# Patient Record
Sex: FEMALE | Race: WHITE | Employment: UNEMPLOYED | URBAN - METROPOLITAN AREA
[De-identification: names, ages, dates, MRNs, and addresses within clinical notes are randomized per-mention and may not be internally consistent; named-entity substitution may affect disease eponyms.]

---

## 2015-09-22 LAB — COLONOSCOPY, EXTERNAL: NORMAL

## 2017-04-13 RX ORDER — SIMVASTATIN 40 MG/1
TABLET, FILM COATED ORAL
Qty: 30 TAB | Refills: 0 | Status: SHIPPED | OUTPATIENT
Start: 2017-04-13 | End: 2017-05-16 | Stop reason: SDUPTHER

## 2017-04-13 RX ORDER — HYDROCHLOROTHIAZIDE 12.5 MG/1
12.5 CAPSULE ORAL DAILY
Qty: 30 CAP | Refills: 0 | Status: SHIPPED | OUTPATIENT
Start: 2017-04-13 | End: 2017-05-16 | Stop reason: SDUPTHER

## 2017-04-13 NOTE — TELEPHONE ENCOUNTER
Received faxed refill request for this medication from the pharmacy that is on file.     hydroCHLOROthiazide (MICROZIDE) 12.5 mg capsule  Normal, Disp-30 Cap, R-0    simvastatin (ZOCOR) 40 mg tablet  TAKE 1 TABLET BY MOUTH NIGHTLY, Normal, Disp-30 Tab, R-0

## 2017-05-16 NOTE — LETTER
5/19/2017 10:47 AM 
 
Ms. Estiven Ferrari 
[address] [facility] [address] Dear Ms. Elier Hopkins missed you! Please call our office at [phone] and schedule a follow up appointment for your continued care. Sincerely, Tonny Bledsoe MD

## 2017-05-18 RX ORDER — SIMVASTATIN 40 MG/1
TABLET, FILM COATED ORAL
Qty: 30 TAB | Refills: 0 | Status: SHIPPED | OUTPATIENT
Start: 2017-05-18 | End: 2017-06-15 | Stop reason: SDUPTHER

## 2017-05-18 RX ORDER — HYDROCHLOROTHIAZIDE 12.5 MG/1
CAPSULE ORAL
Qty: 30 CAP | Refills: 0 | Status: SHIPPED | OUTPATIENT
Start: 2017-05-18 | End: 2017-06-15 | Stop reason: SDUPTHER

## 2017-06-02 ENCOUNTER — TELEPHONE (OUTPATIENT)
Dept: FAMILY MEDICINE CLINIC | Age: 67
End: 2017-06-02

## 2017-06-02 NOTE — TELEPHONE ENCOUNTER
----- Message from [facility] sent at 6/2/2017  2:54 PM EDT -----  Regarding: Dr. Jade Chen called requesting a call back from Siria Barrios in regards to a referral. Pt contact number [phone]. Pt need referral for Dermatologist Bella Vu [phone]. Need appt for Wednesday 6/7/17 at 3:00 pm. Address is [address] [place] [address], [address] [place] [address].  Patient is being seen for a follow up on a skin lesion

## 2017-06-07 NOTE — TELEPHONE ENCOUNTER
Referral Request Telephone Call      Insurance Name:     [facility] (MEDICARE REPLACEMENT/ADVANTAGE - HMO) [id number] [place] [address] Road , [address], [id number] [place] [address] Drive [phone] View additional contact information       Insurance ID:  [id number]   Specialist Name: Dr. Taylor Soto   Type of Specialty:  Dermatology    Address of Specialist:  Noemi Helvetia, [address] [place] [address]   Phone/Fax Number of Specialist: MQCNJ#[phone]  [facility]#[id number]   Diagnosis: Follow up appointment for a skin lesion   Appointment Date: 6/7/2017 @ 3pm   NPI    Tax ID

## 2017-06-15 RX ORDER — SIMVASTATIN 40 MG/1
TABLET, FILM COATED ORAL
Qty: 30 TAB | Refills: 0 | Status: SHIPPED | OUTPATIENT
Start: 2017-06-15 | End: 2017-07-14 | Stop reason: SDUPTHER

## 2017-06-15 RX ORDER — HYDROCHLOROTHIAZIDE 12.5 MG/1
CAPSULE ORAL
Qty: 30 CAP | Refills: 0 | Status: SHIPPED | OUTPATIENT
Start: 2017-06-15 | End: 2017-07-10 | Stop reason: SDUPTHER

## 2017-07-10 ENCOUNTER — OFFICE VISIT (OUTPATIENT)
Dept: FAMILY MEDICINE CLINIC | Age: 67
End: 2017-07-10

## 2017-07-10 VITALS
RESPIRATION RATE: 18 BRPM | TEMPERATURE: 98.2 F | DIASTOLIC BLOOD PRESSURE: 76 MMHG | BODY MASS INDEX: 36.79 KG/M2 | OXYGEN SATURATION: 97 % | WEIGHT: 207.6 LBS | HEART RATE: 66 BPM | SYSTOLIC BLOOD PRESSURE: 140 MMHG | HEIGHT: 63 IN

## 2017-07-10 DIAGNOSIS — R20.0 THIGH NUMBNESS: ICD-10-CM

## 2017-07-10 DIAGNOSIS — E78.5 HYPERLIPIDEMIA, UNSPECIFIED HYPERLIPIDEMIA TYPE: ICD-10-CM

## 2017-07-10 DIAGNOSIS — Z71.89 ADVANCE CARE PLANNING: ICD-10-CM

## 2017-07-10 DIAGNOSIS — Z13.820 SCREENING FOR OSTEOPOROSIS: ICD-10-CM

## 2017-07-10 DIAGNOSIS — R13.10 DYSPHAGIA, UNSPECIFIED TYPE: ICD-10-CM

## 2017-07-10 DIAGNOSIS — Z00.00 ENCOUNTER FOR MEDICARE ANNUAL WELLNESS EXAM: Primary | ICD-10-CM

## 2017-07-10 DIAGNOSIS — R73.03 PREDIABETES: ICD-10-CM

## 2017-07-10 DIAGNOSIS — Z78.0 POSTMENOPAUSAL: ICD-10-CM

## 2017-07-10 DIAGNOSIS — I10 HTN, GOAL BELOW 140/90: ICD-10-CM

## 2017-07-10 RX ORDER — HYDROCHLOROTHIAZIDE 12.5 MG/1
CAPSULE ORAL
Qty: 30 CAP | Refills: 0 | Status: SHIPPED | OUTPATIENT
Start: 2017-07-10 | End: 2017-07-14 | Stop reason: SDUPTHER

## 2017-07-10 NOTE — PROGRESS NOTES
Iza Christian is a 79 y.o. female and presents for annual Medicare Wellness Visit. Problem List: Reviewed with patient and discussed risk factors. Patient Active Problem List   Diagnosis Code    Insomnia G47.00    Depression F32.9    Esophageal reflux K21.9    Obesity, unspecified E66.9    Allergic rhinitis due to other allergen J30.89    Schizophrenia (HCC) F20.9    Anxiety F41.9    Arthritis M19.90    Asthma J45.909    HTN, goal below 140/90 I10    IBS (irritable bowel syndrome) K58.9    Osteopenia M85.80    Prediabetes R73.03    Skin cancer C44.90    Stroke (HCC) I63.9    Sun-damaged skin L57.8    PFO (patent foramen ovale) Q21.1    AVM (arteriovenous malformation) brain Q28.2    Hyperlipidemia E78.5       Current medical providers:  Patient Care Team:  Cheryl Jeffery MD as PCP - General (Family Practice)  Yarelis Chan MD (Psychiatry)  Addy Hamilton MD (Cardiology)    PSH: Reviewed with patient  Past Surgical History:   Procedure Laterality Date    HX ADENOIDECTOMY      HX HEENT Bilateral     surgery for lazy eye    HX HYSTERECTOMY  1984    endometriosis    HX ORTHOPAEDIC      bone spur removed from right foot    HX TONSILLECTOMY          SH: Reviewed with patient  Social History   Substance Use Topics    Smoking status: Never Smoker    Smokeless tobacco: Never Used    Alcohol use No       FH: Reviewed with patient  Family History   Problem Relation Age of Onset    Stroke Mother     Heart Disease Father     Other Sister      benign brain tumor/arthritis    Thyroid Disease Sister      goiter    Diabetes Sister        Medications/Allergies: Reviewed with patient  Current Outpatient Prescriptions on File Prior to Visit   Medication Sig Dispense Refill    simvastatin (ZOCOR) 40 mg tablet TAKE ONE TABLET BY MOUTH NIGHTLY (PATIENT IS OVERDUE FOR OFFICE VISIT) 30 Tab 0    haloperidol (HALDOL) 1 mg tablet Take 1 Tab by mouth daily.  30 Tab 3    aspirin (ASPIRIN) 325 mg tablet Take 1 Tab by mouth daily. 365 Tab 0    FLUoxetine (PROZAC) 40 mg capsule Take 40 mg by mouth daily.  loratadine 10 mg Cap Take 10 mg by mouth as needed.  Omega-3-DHA-EPA-Fish Oil 1,000 (120-180) mg Cap Take 1,000 mg by mouth two (2) times a day. No current facility-administered medications on file prior to visit. Allergies   Allergen Reactions    Voltaren [Diclofenac Sodium] Diarrhea       Objective:  Visit Vitals    /76 (BP 1 Location: Right arm, BP Patient Position: Sitting)    Pulse 66    Temp 98.2 °F (36.8 °C) (Oral)    Resp 18    Ht 5' 3\" (1.6 m)    Wt 207 lb 9.6 oz (94.2 kg)    SpO2 97%    BMI 36.77 kg/m2    Body mass index is 36.77 kg/(m^2). Assessment of cognitive impairment: Alert and oriented x 3    Depression Screen:   PHQ over the last two weeks 3/8/2016   Little interest or pleasure in doing things More than half the days   Feeling down, depressed or hopeless Several days   Total Score PHQ 2 3       Fall Risk Assessment:    Fall Risk Assessment, last 12 mths 7/10/2017   Able to walk? Yes   Fall in past 12 months? No       Functional Ability:   Does the patient exhibit a steady gait? yes   How long did it take the patient to get up and walk from a sitting position? 3 secs   Is the patient self reliant?  (ie can do own laundry, meals, household chores)  yes     Does the patient handle his/her own medications? yes     Does the patient handle his/her own money? yes     Is the patients home safe (ie good lighting, handrails on stairs and bath, etc.)? yes     Did you notice or did patient express any hearing difficulties? no     Did you notice or did patient express any vision difficulties?   no     Were distance and reading eye charts used?   no       Advance Care Planning:   Patient was offered the opportunity to discuss advance care planning:  yes     Does patient have an Advance Directive:  no   If no, did you provide information on Caring Connections? yes       Plan:      Orders Placed This Encounter    DEXA BONE DENSITY STUDY AXIAL    HEMOGLOBIN A1C WITH EAG    METABOLIC PANEL, COMPREHENSIVE    LIPID PANEL    REFERRAL TO GASTROENTEROLOGY    hydroCHLOROthiazide (MICROZIDE) 12.5 mg capsule       Health Maintenance   Topic Date Due    GLAUCOMA SCREENING Q2Y  03/31/2017    Pneumococcal 65+ High/Highest Risk (2 of 2 - PCV13) 07/22/2017    INFLUENZA AGE 9 TO ADULT  08/01/2017    MEDICARE YEARLY EXAM  07/11/2018    BREAST CANCER SCRN MAMMOGRAM  08/04/2018    COLONOSCOPY  09/16/2025    DTaP/Tdap/Td series (2 - Td) 03/08/2026    Hepatitis C Screening  Completed    OSTEOPOROSIS SCREENING (DEXA)  Completed    ZOSTER VACCINE AGE 60>  Completed       *Patient verbalized understanding and agreement with the plan. A copy of the After Visit Summary with personalized health plan was given to the patient today.

## 2017-07-10 NOTE — MR AVS SNAPSHOT
Visit Information Date & Time Provider Department Dept. Phone Encounter #  
 7/10/2017  3:15 PM Arash Navarrete  [facility] [phone] [id number] Follow-up Instructions Return in about 6 months (around 1/10/2018) for HTN follow up. Upcoming Health Maintenance Date Due  
 GLAUCOMA SCREENING Q2Y 3/31/2017 Pneumococcal 65+ High/Highest Risk (2 of 2 - PCV13) 7/22/2017 MEDICARE YEARLY EXAM 7/23/2017 INFLUENZA AGE 9 TO ADULT 8/1/2017 BREAST CANCER SCRN MAMMOGRAM 8/4/2018 COLONOSCOPY 9/16/2025 DTaP/Tdap/Td series (2 - Td) 3/8/2026 Allergies as of 7/10/2017  Review Complete On: 7/10/2017 By: Brandy Winston LPN Severity Noted Reaction Type Reactions Voltaren [Diclofenac Sodium]  09/21/2015    Diarrhea Current Immunizations  Reviewed on 7/22/2016 Name Date Hep B Vaccine (Adult) 10/8/2015  2:40 PM, 9/9/2015 Influenza Vaccine 10/26/2015 Influenza Vaccine Whole 10/16/2009 Pneumococcal Polysaccharide (PPSV-23) 7/22/2016, 3/20/2015 Tdap 3/8/2016 Zoster Vaccine, Live 7/22/2016 Not reviewed this visit You Were Diagnosed With   
  
 Codes Comments HTN, goal below 140/90    -  Primary ICD-10-CM: I10 
ICD-9-CM: 401.9 Postmenopausal     ICD-10-CM: Z78.0 ICD-9-CM: V49.81 Screening for osteoporosis     ICD-10-CM: Z13.820 ICD-9-CM: V82.81 Prediabetes     ICD-10-CM: R73.03 
ICD-9-CM: 790.29 Hyperlipidemia, unspecified hyperlipidemia type     ICD-10-CM: E78.5 ICD-9-CM: 272.4 Dysphagia, unspecified type     ICD-10-CM: R13.10 ICD-9-CM: 787.20 Vitals BP Pulse Temp Resp Height(growth percentile) Weight(growth percentile) 140/76 (BP 1 Location: Right arm, BP Patient Position: Sitting) 66 98.2 °F (36.8 °C) (Oral) 18 5' 3\" (1.6 m) 207 lb 9.6 oz (94.2 kg) SpO2 BMI OB Status Smoking Status 97% 36.77 kg/m2 Hysterectomy Never Smoker BMI and BSA Data Body Mass Index Body Surface Area  
 36.77 kg/m 2 2.05 m 2 Preferred Pharmacy Pharmacy Name Phone [facility], [address] [place] [address] [phone] Your Updated Medication List  
  
   
This list is accurate as of: 7/10/17  3:47 PM.  Always use your most recent med list.  
  
  
  
  
 aspirin 325 mg tablet Commonly known as:  ASPIRIN Take 1 Tab by mouth daily. haloperidol 1 mg tablet Commonly known as:  HALDOL Take 1 Tab by mouth daily. hydroCHLOROthiazide 12.5 mg capsule Commonly known as:  Jevon Roosevelt TAKE ONE CAPSULE BY MOUTH DAILY (PATIENT NEEDS TO MAKE FOLLOW UP VISIT)  
  
 loratadine 10 mg Cap Take 10 mg by mouth as needed. Omega-3-DHA-EPA-Fish Oil 1,000 mg (120 mg-180 mg) Cap Take 1,000 mg by mouth two (2) times a day. PROzac 40 mg capsule Generic drug:  FLUoxetine Take 40 mg by mouth daily. simvastatin 40 mg tablet Commonly known as:  ZOCOR  
TAKE ONE TABLET BY MOUTH NIGHTLY (PATIENT IS OVERDUE FOR OFFICE VISIT) Prescriptions Sent to Pharmacy Refills  
 hydroCHLOROthiazide (MICROZIDE) 12.5 mg capsule 0 Sig: TAKE ONE CAPSULE BY MOUTH DAILY (PATIENT NEEDS TO MAKE FOLLOW UP VISIT) Class: Normal  
 Pharmacy: [facility], [address] [place] [address] Ph #: [phone] We Performed the Following HEMOGLOBIN A1C WITH EAG [54391 CPT(R)] LIPID PANEL [82702 CPT(R)] METABOLIC PANEL, COMPREHENSIVE [79467 CPT(R)] REFERRAL TO GASTROENTEROLOGY [FUE09 Custom] Follow-up Instructions Return in about 6 months (around 1/10/2018) for HTN follow up. To-Do List   
 07/10/2017 Imaging:  DEXA BONE DENSITY STUDY AXIAL Referral Information Referral ID Referred By Referred To  
  
 [id number] SHIVANI HUFF [address]  [address] [facility] [address], [address] Phone: [phone] Fax: [phone] Visits Status Start Date End Date 1 New Request 7/10/17 7/10/18 If your referral has a status of pending review or denied, additional information will be sent to support the outcome of this decision. Patient Instructions DASH Diet: Care Instructions Your Care Instructions The DASH diet is an eating plan that can help lower your blood pressure. DASH stands for Dietary Approaches to Stop Hypertension. Hypertension is high blood pressure. The DASH diet focuses on eating foods that are high in calcium, potassium, and magnesium. These nutrients can lower blood pressure. The foods that are highest in these nutrients are fruits, vegetables, low-fat dairy products, nuts, seeds, and legumes. But taking calcium, potassium, and magnesium supplements instead of eating foods that are high in those nutrients does not have the same effect. The DASH diet also includes whole grains, fish, and poultry. The DASH diet is one of several lifestyle changes your doctor may recommend to lower your high blood pressure. Your doctor may also want you to decrease the amount of sodium in your diet. Lowering sodium while following the DASH diet can lower blood pressure even further than just the DASH diet alone. Follow-up care is a key part of your treatment and safety. Be sure to make and go to all appointments, and call your doctor if you are having problems. It's also a good idea to know your test results and keep a list of the medicines you take. How can you care for yourself at home? Following the DASH diet · Eat 4 to 5 servings of fruit each day. A serving is 1 medium-sized piece of fruit, ½ cup chopped or canned fruit, 1/4 cup dried fruit, or 4 ounces (½ cup) of fruit juice. Choose fruit more often than fruit juice. · Eat 4 to 5 servings of vegetables each day.  A serving is 1 cup of lettuce or raw leafy vegetables, ½ cup of chopped or cooked vegetables, or 4 ounces (½ cup) of vegetable juice. Choose vegetables more often than vegetable juice. · Get 2 to 3 servings of low-fat and fat-free dairy each day. A serving is 8 ounces of milk, 1 cup of yogurt, or 1 ½ ounces of cheese. · Eat 6 to 8 servings of grains each day. A serving is 1 slice of bread, 1 ounce of dry cereal, or ½ cup of cooked rice, pasta, or cooked cereal. Try to choose whole-grain products as much as possible. · Limit lean meat, poultry, and fish to 2 servings each day. A serving is 3 ounces, about the size of a deck of cards. · Eat 4 to 5 servings of nuts, seeds, and legumes (cooked dried beans, lentils, and split peas) each week. A serving is 1/3 cup of nuts, 2 tablespoons of seeds, or ½ cup of cooked beans or peas. · Limit fats and oils to 2 to 3 servings each day. A serving is 1 teaspoon of vegetable oil or 2 tablespoons of salad dressing. · Limit sweets and added sugars to 5 servings or less a week. A serving is 1 tablespoon jelly or jam, ½ cup sorbet, or 1 cup of lemonade. · Eat less than 2,300 milligrams (mg) of sodium a day. If you limit your sodium to 1,500 mg a day, you can lower your blood pressure even more. Tips for success · Start small. Do not try to make dramatic changes to your diet all at once. You might feel that you are missing out on your favorite foods and then be more likely to not follow the plan. Make small changes, and stick with them. Once those changes become habit, add a few more changes. · Try some of the following: ¨ Make it a goal to eat a fruit or vegetable at every meal and at snacks. This will make it easy to get the recommended amount of fruits and vegetables each day. ¨ Try yogurt topped with fruit and nuts for a snack or healthy dessert. ¨ Add lettuce, tomato, cucumber, and onion to sandwiches. ¨ Combine a ready-made pizza crust with low-fat mozzarella cheese and lots of vegetable toppings.  Try using tomatoes, squash, spinach, broccoli, carrots, cauliflower, and onions. ¨ Have a variety of cut-up vegetables with a low-fat dip as an appetizer instead of chips and dip. ¨ Sprinkle sunflower seeds or chopped almonds over salads. Or try adding chopped walnuts or almonds to cooked vegetables. ¨ Try some vegetarian meals using beans and peas. Add garbanzo or kidney beans to salads. Make burritos and tacos with mashed molina beans or black beans. Where can you learn more? Go to http://[employer].info/. Enter B108 in the search box to learn more about \"DASH Diet: Care Instructions. \" Current as of: April 3, 2017 Content Version: 11.3 © 1899-7925 [employer]. Care instructions adapted under license by [employer] (which disclaims liability or warranty for this information). If you have questions about a medical condition or this instruction, always ask your healthcare professional. [place] [address] any warranty or liability for your use of this information. Introducing [facility]! Dear Moses Morales: Thank you for requesting a [employer] account. Our records indicate that you already have an active [employer] account. You can access your account anytime at [email or web address] Did you know that you can access your hospital and ER discharge instructions at any time in [employer]? You can also review all of your test results from your hospital stay or ER visit. Additional Information If you have questions, please visit the Frequently Asked Questions section of the [employer] website at [email or web address] Remember, [employer] is NOT to be used for urgent needs. For medical emergencies, dial 911. Now available from your iPhone and Android! Please provide this summary of care documentation to your next provider. Your primary care clinician is listed as Yareli Alamo.  If you have any questions after today's visit, please call [phone].

## 2017-07-10 NOTE — PATIENT INSTRUCTIONS

## 2017-07-10 NOTE — LETTER
7/11/2017 11:27 AM 
 
Ms. Janett Rojas 
[address] [facility] [address] Dear Janett Bob: 
 
Please find your most recent results below. Resulted Orders HEMOGLOBIN A1C WITH EAG Result Value Ref Range Hemoglobin A1c 5.4 4.8 - 5.6 % Comment:  
            Pre-diabetes: 5.7 - 6.4 Diabetes: >6.4 Glycemic control for adults with diabetes: <7.0 Estimated average glucose 108 mg/dL Narrative Performed at:  [address]  [id number] : Marisa Byers MD, Phone:  [phone] METABOLIC PANEL, COMPREHENSIVE Result Value Ref Range Glucose 86 65 - 99 mg/dL BUN 18 8 - 27 mg/dL Creatinine 0.87 0.57 - 1.00 mg/dL GFR est non-AA 69 >59 mL/min/1.73 GFR est AA 80 >59 mL/min/1.73  
 BUN/Creatinine ratio 21 12 - 28 Sodium 140 134 - 144 mmol/L Potassium 4.1 3.5 - 5.2 mmol/L Chloride 97 96 - 106 mmol/L  
 CO2 25 18 - 29 mmol/L Calcium 10.3 8.7 - 10.3 mg/dL Protein, total 7.2 6.0 - 8.5 g/dL Albumin 4.6 3.6 - 4.8 g/dL GLOBULIN, TOTAL 2.6 1.5 - 4.5 g/dL A-G Ratio 1.8 1.2 - 2.2 Bilirubin, total 0.9 0.0 - 1.2 mg/dL Alk. phosphatase 59 39 - 117 IU/L  
 AST (SGOT) 17 0 - 40 IU/L  
 ALT (SGPT) 22 0 - 32 IU/L Narrative Performed at:  [address]  [id number] : Marisa Byers MD, Phone:  [phone] LIPID PANEL Result Value Ref Range Cholesterol, total 136 100 - 199 mg/dL Triglyceride 118 0 - 149 mg/dL HDL Cholesterol 46 >39 mg/dL VLDL, calculated 24 5 - 40 mg/dL LDL, calculated 66 0 - 99 mg/dL Narrative Performed at:  [address]  [id number] : Marisa Byers MD, Phone:  [phone] CVD REPORT Result Value Ref Range INTERPRETATION Note Comment:  
   Supplement report is available. Narrative Performed at:  [address] [place] [address]  [id number] : Contreras Castanon PhD, Phone:  [phone] RECOMMENDATIONS: 
Your labs are stable. Please continue your medications as instructed. Please call me if you have any questions: [phone] Sincerely, Cordelia Esquivel MD

## 2017-07-10 NOTE — PROGRESS NOTES
Patient Name: Acosta Dupree   MRN: [id number]    Kristen Ro is a 79 y.o. female who presents with the following: The patient has hypertension, hyperlipidemia and pre-DM. She reports taking medications as instructed, no medication side effects noted, no TIA's, no chest pain on exertion, no dyspnea on exertion, no swelling of ankles, no orthopnea or paroxysmal nocturnal dyspnea. Diet and Lifestyle: generally follows a low fat low cholesterol diet, generally follows a low sodium diet, no formal exercise but active during the day. Lab review: orders written for new lab studies as appropriate; see orders. Dysphagia  Reports one year hx of progressive difficulty swallowing with foods and liquids. States she sometimes throws up food and is then able to swallow food afterwards. Has difficulty swallowing pills with water as well. Denies weight loss, fevers, abdominal pain. Never a smoker. L thigh numbness  Reports several year hx of intermittent numbness, burning, tingling. Has not tried any medications. Denies wearing tight belts or waistline. Does state that she spends most of her time in a recliner at home. Reports history of chronic low back pain. History of osteopenia; discontinued Fosamax recently as she did not meet threshold for treatment therapy yet. Due for DEXA scan. Review of Systems   Constitutional: Negative for fever, malaise/fatigue and weight loss. Respiratory: Negative for cough, hemoptysis, shortness of breath and wheezing. Cardiovascular: Negative for chest pain, palpitations, leg swelling and PND. Gastrointestinal: Positive for heartburn. Negative for abdominal pain, blood in stool, constipation, diarrhea, melena, nausea and vomiting.         Difficulty swallowing   Musculoskeletal:        Left thigh numbness       The patient's medications, allergies, past medical history, surgical history, family history and social history were reviewed and updated where appropriate. Prior to Admission medications    Medication Sig Start Date End Date Taking? Authorizing Provider   hydroCHLOROthiazide (MICROZIDE) 12.5 mg capsule TAKE ONE CAPSULE BY MOUTH DAILY (PATIENT NEEDS TO MAKE FOLLOW UP VISIT) 6/15/17  Yes Kaley Reina MD   simvastatin (ZOCOR) 40 mg tablet TAKE ONE TABLET BY MOUTH NIGHTLY (PATIENT IS OVERDUE FOR OFFICE VISIT) 6/15/17  Yes Kaley Reina MD   haloperidol (HALDOL) 1 mg tablet Take 1 Tab by mouth daily. 3/20/15  Yes Macario Parra MD   aspirin (ASPIRIN) 325 mg tablet Take 1 Tab by mouth daily. 6/13/13  Yes Bayron Mora MD   FLUoxetine (PROZAC) 40 mg capsule Take 40 mg by mouth daily. Yes Historical Provider   loratadine 10 mg Cap Take 10 mg by mouth as needed. Yes Historical Provider   Omega-3-DHA-EPA-Fish Oil 1,000 (120-180) mg Cap Take 1,000 mg by mouth two (2) times a day. Yes Historical Provider       Allergies   Allergen Reactions    Voltaren [Diclofenac Sodium] Diarrhea           OBJECTIVE    Visit Vitals    /76 (BP 1 Location: Right arm, BP Patient Position: Sitting)    Pulse 66    Temp 98.2 °F (36.8 °C) (Oral)    Resp 18    Ht 5' 3\" (1.6 m)    Wt 207 lb 9.6 oz (94.2 kg)    SpO2 97%    BMI 36.77 kg/m2       Physical Exam   Constitutional: She is well-developed, well-nourished, and in no distress. No distress. HENT:   Head: Normocephalic and atraumatic. Right Ear: External ear normal.   Left Ear: External ear normal.   Eyes: Conjunctivae and EOM are normal. Pupils are equal, round, and reactive to light. Cardiovascular: Normal rate, regular rhythm and normal heart sounds. Exam reveals no gallop and no friction rub. No murmur heard. Pulmonary/Chest: Effort normal and breath sounds normal. No respiratory distress. She has no wheezes. Musculoskeletal:        Right hip: Normal.        Left hip: Normal.   Skin: She is not diaphoretic.    Psychiatric: Mood, memory, affect and judgment normal.   Nursing note and vitals reviewed. ASSESSMENT AND PLAN  Julian Hathaway is a 79 y.o. female who presents today for:    1. Encounter for Medicare annual wellness exam  See other note    2. HTN, goal below 140/90  Stable, continue current treatment. 3. Prediabetes  - HEMOGLOBIN A1C WITH EAG  - METABOLIC PANEL, COMPREHENSIVE    4. Hyperlipidemia, unspecified hyperlipidemia type  Will calculate ASCVD risk score pending labs. - LIPID PANEL    5. Dysphagia, unspecified type  Recommend GI referral given ongoing symptoms of dysphagia. May benefit from barium swallow study or EGD.  - REFERRAL TO GASTROENTEROLOGY    6. Postmenopausal  - DEXA BONE DENSITY STUDY AXIAL; Future    7. Screening for osteoporosis  - DEXA BONE DENSITY STUDY AXIAL; Future    8. Thigh numbness  Unclear etiology; may be due to meralgia paresthetica. Recommended patient to try modifying her sitting position or getting a new cushion. 9. Advance care planning  Pt to meet with NN. Medications Discontinued During This Encounter   Medication Reason    traMADol (ULTRAM) 50 mg tablet Therapy Completed    hydroCHLOROthiazide (MICROZIDE) 12.5 mg capsule Reorder       Follow-up Disposition:  Return in about 6 months (around 1/10/2018) for HTN follow up. Medication risks/benefits/costs/interactions/alternatives discussed with patient. Advised patient to call back or return to office if symptoms worsen/change/persist. If patient cannot reach us or should anything more severe/urgent arise he/she should proceed directly to the nearest emergency department. Discussed expected course/resolution/complications of diagnosis in detail with patient. Patient given a written after visit summary which includes his/her diagnoses, current medications and vitals. Patient expressed understanding with the diagnosis and plan.      Brit Magana M.D.

## 2017-07-10 NOTE — ACP (ADVANCE CARE PLANNING)
Advance Care Planning:   Patient was offered the opportunity to discuss advance care planning:  yes     Does patient have an Advance Directive:  no   If no, did you provide information on Caring Connections? yes     Conducted ACP discussion today. Advanced Care Planning Information Card copy provided to patient; he/she will reach out to NN/facilitator to review.

## 2017-07-10 NOTE — PROGRESS NOTES
Chief Complaint   Patient presents with    Medication Evaluation     needs refills     1. Have you been to the ER, urgent care clinic since your last visit? Hospitalized since your last visit? No    2. Have you seen or consulted any other health care providers outside of the [address] [place] [address] since your last visit? Include any pap smears or colon screening.  No

## 2017-07-11 LAB
ALBUMIN SERPL-MCNC: 4.6 G/DL (ref 3.6–4.8)
ALBUMIN/GLOB SERPL: 1.8 {RATIO} (ref 1.2–2.2)
ALP SERPL-CCNC: 59 IU/L (ref 39–117)
ALT SERPL-CCNC: 22 IU/L (ref 0–32)
AST SERPL-CCNC: 17 IU/L (ref 0–40)
BILIRUB SERPL-MCNC: 0.9 MG/DL (ref 0–1.2)
BUN SERPL-MCNC: 18 MG/DL (ref 8–27)
BUN/CREAT SERPL: 21 (ref 12–28)
CALCIUM SERPL-MCNC: 10.3 MG/DL (ref 8.7–10.3)
CHLORIDE SERPL-SCNC: 97 MMOL/L (ref 96–106)
CHOLEST SERPL-MCNC: 136 MG/DL (ref 100–199)
CO2 SERPL-SCNC: 25 MMOL/L (ref 18–29)
CREAT SERPL-MCNC: 0.87 MG/DL (ref 0.57–1)
EST. AVERAGE GLUCOSE BLD GHB EST-MCNC: 108 MG/DL
GLOBULIN SER CALC-MCNC: 2.6 G/DL (ref 1.5–4.5)
GLUCOSE SERPL-MCNC: 86 MG/DL (ref 65–99)
HBA1C MFR BLD: 5.4 % (ref 4.8–5.6)
HDLC SERPL-MCNC: 46 MG/DL
INTERPRETATION, 910389: NORMAL
LDLC SERPL CALC-MCNC: 66 MG/DL (ref 0–99)
POTASSIUM SERPL-SCNC: 4.1 MMOL/L (ref 3.5–5.2)
PROT SERPL-MCNC: 7.2 G/DL (ref 6–8.5)
SODIUM SERPL-SCNC: 140 MMOL/L (ref 134–144)
TRIGL SERPL-MCNC: 118 MG/DL (ref 0–149)
VLDLC SERPL CALC-MCNC: 24 MG/DL (ref 5–40)

## 2017-07-11 NOTE — PROGRESS NOTES
Please notify patient regarding their test results:    No DM. Lipid panel WNL on statin. Continue current meds.

## 2017-07-18 ENCOUNTER — TELEPHONE (OUTPATIENT)
Dept: FAMILY MEDICINE CLINIC | Age: 67
End: 2017-07-18

## 2017-07-18 NOTE — TELEPHONE ENCOUNTER
Referral Request Telephone Call      Insurance Name:     Cathy Garcia ([facility])    Insurance ID:   [id number]   Specialist Name: Dr. Lolis Berg   Type of Specialty:  Gastroenterologist    Address of Specialist:  [address]   Phone/Fax Number of Specialist: Phone:  [phone]  Fax: [phone]   Diagnosis: Difficulty swallowing   Appointment Date: 07/27/17   NPI    Tax ID

## 2017-08-03 ENCOUNTER — HOSPITAL ENCOUNTER (OUTPATIENT)
Dept: MAMMOGRAPHY | Age: 67
Discharge: HOME | End: 2017-08-03
Attending: FAMILY MEDICINE
Payer: MEDICARE

## 2017-08-03 DIAGNOSIS — Z13.820 SCREENING FOR OSTEOPOROSIS: ICD-10-CM

## 2017-08-03 DIAGNOSIS — Z78.0 POSTMENOPAUSAL: ICD-10-CM

## 2017-08-03 PROCEDURE — 77080 DXA BONE DENSITY AXIAL: CPT

## 2017-08-03 NOTE — LETTER
8/4/2017 10:08 AM 
 
Ms. Fritz Bowles 
[address] [facility] [id number] Dear Fritz Bowles: 
 
Please find your most recent results below. Resulted Orders DEXA BONE DENSITY STUDY AXIAL Narrative Bone Mineral Density Indication:  SCREENING Age: 79 Sex: Female. Menopause status: Postmenopausal. 
Hormone replacement therapy: No  
 
Number of falls in the past year:   None. Risk factors for osteoporosis:  None. Current medication for osteoporosis: None. Comparison: 4/3/2015 Technique: Imaging was performed on the [address] [place] [address] 
meta-analysis fracture risk calculator (FRAX) analysis was performed for 10 year 
fracture risk probability assessment Excluded sites: None Findings: 
  
 
Femoral Neck:  Left Bone mineral density (gm/cm2):? 0.813 
% of peak bone mass: 78 
% for age matched controls:? 89 
T-score: -1.6 Z-score: -0.7 Total Hip: Left Bone mineral density (gm/cm2):  0.956 
% of peak bone mass:   95 
% for age matched controls:  80 T-score:   -0.4 Z-score:  0.2 Lumbar Spine:  L1-L4 Bone mineral density (gm/cm2):  1.282 
% of peak bone mass:  107  
% for age matched controls:  80 T-score:  0.7 Z-score:  1.3 The T score for the left distal third radius is -0.4. Impression Impression: This patient is osteopenic using the World Health Organization criteria As compared to the prior study, there has been an increase in the bone mineral 
density of the lumbar spine of 9.3% but a decrease in the bone mineral density 
of the left total hip of 4.9%. 10 year probability of major osteoporotic fracture:  8.9% 10 year probability of hip fracture:  1.1% Recommendations: 
Therapy recommendations need to be tailored to each individual patient.  Using 
the [address] [facility]) FRAX absolute fracture algorithm, the 
National Osteoporosis Foundation recommends beginning pharmacological therapy in 
 postmenopausal women and men over the age of 48 with a 8 year probability of a 
hip fracture of >3% OR with the 10 year probability of a major osteoporotic 
fracture of >20%. Please reconsider testing based on risk factors. Currently, Medicare will only 
reimburse for a central DXA examination every two years, unless the patient is 
on chronic glucocorticoid therapy. Note: Please note that reliable, valid comparisons cannot be made between 
studies which have been performed on machines from different manufacturers. If 
clinically warranted, a follow up study performed at this site, on the same 
unit, would allow the most sensitive assessment of change in bone mineral 
density. RECOMMENDATIONS: 
Dexa still shows osteopenia, overall stable. For osteopenia, it is recommended to do weight-bearing exercises, and to get at least vitamin D3 800 units/day and calcium at 1500 mg/day. Will repeat DEXA in 2 years. Please call me if you have any questions: [phone] Sincerely, [facility] DEXA 1

## 2017-08-03 NOTE — PROGRESS NOTES
Please notify patient regarding their test results:    Dexa still shows osteopenia, overall stable. For osteopenia, it is recommended to do weight-bearing exercises, and to get at least vitamin D3 800 units/day and calcium at 1500 mg/day. Will repeat DEXA in 2 years.

## 2017-08-04 NOTE — PROGRESS NOTES
Spoke with patient after verifying name and  regarding Dr. Izella Cogan recommendations. Writer informed patient of Dr. Izella Cogan recommendations. Patient given an opportunity to ask questions, repeated information, and verbalized understanding.

## 2017-08-09 ENCOUNTER — HOSPITAL ENCOUNTER (OUTPATIENT)
Dept: GENERAL RADIOLOGY | Age: 67
Discharge: HOME | End: 2017-08-09
Attending: INTERNAL MEDICINE
Payer: MEDICARE

## 2017-08-09 DIAGNOSIS — R13.12 OROPHARYNGEAL DYSPHAGIA: ICD-10-CM

## 2017-08-09 DIAGNOSIS — R13.10 DYSPHAGIA: ICD-10-CM

## 2017-08-09 PROCEDURE — G8998 SWALLOW D/C STATUS: HCPCS

## 2017-08-09 PROCEDURE — G8996 SWALLOW CURRENT STATUS: HCPCS

## 2017-08-09 PROCEDURE — 74230 X-RAY XM SWLNG FUNCJ C+: CPT

## 2017-08-09 PROCEDURE — G8997 SWALLOW GOAL STATUS: HCPCS

## 2017-08-09 PROCEDURE — 92611 MOTION FLUOROSCOPY/SWALLOW: CPT

## 2017-08-09 NOTE — PROGRESS NOTES
[address] [place] [address] [employer], [address] [address]    Speech Pathology Modified barium swallow Study with cms g codes  Patient: Iza Christian (65 y.o. female)  Date: 8/9/2017  Referring Provider:  Dr. Xenia Lezama:   Patient reporting that her difficulty swallowing has progressively gotten worse over the last year. She reports difficulty swallowing breads and pills especially. Patient describes a globus sensation on the R side and eventual vomiting at least a couple times a week. OBJECTIVE:   Past Medical History:   Past Medical History:   Diagnosis Date    Allergic rhinitis due to other allergen     Anxiety     Arthritis     Asthma     in younger years    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    MRI 2013; stable ischemic changes; hx of CVA    Depression 9/23/2009    Esophageal reflux     HTN, goal below 140/90     Hyperlipidemia 9/23/2009    IBS (irritable bowel syndrome)     Insomnia     Mixed hyperlipidemia     Obesity, unspecified     Osteopenia     started Fosamax therapy 4/2015    PFO (patent foramen ovale)     reportedly dx'ed with CVA in 2012    Prediabetes     Schizophrenia ([address]     Skin cancer     Stroke ([facility] [address]     no deficits    Sun-damaged skin      Past Surgical History:   Procedure Laterality Date    HX ADENOIDECTOMY      HX HEENT Bilateral     surgery for lazy eye    HX HYSTERECTOMY  1984    endometriosis    HX ORTHOPAEDIC      bone spur removed from right foot    HX TONSILLECTOMY       Current Dietary Status: regular/thin liquids   Radiologist:  (Dr. Tate Sampson)  Film Views: Lateral;Fluoro  Patient Position:  (standing)    Trial 1:   Consistency Presented: Thin liquid; Solid;Puree;Pill/Tablet   How Presented: Self-fed/presented;Cup/sip; Successive swallows;Spoon   Consistency Amount:  (4oz thin Ba, Ba tablet, 1/2 Ba paste, cracker)   Bolus Acceptance: No impairment   Bolus Formation/Control: No impairment:     Propulsion: No impairment   Oral Residue: None   Initiation of Swallow: Triggered at base of tongue   Timing: No impairment   Penetration: None   Aspiration/Timing: No evidence of aspiration   Pharyngeal Clearance: No residue                       Decreased Tongue Base Retraction?: No  Laryngeal Elevation: WFL (within functional limits)  Aspiration/Penetration Score: 1 (No penetration or aspiration-Contrast does not enter the airway)  Pharyngeal Symmetry: Not assessed  Pharyngeal-Esophageal Segment: No impairment  Pharyngeal Dysfunction: None    Oral Phase Severity: No impairment  Pharyngeal Phase Severity: N/A    In compliance with CMSs Claims Based Outcome Reporting, the following G-code set was chosen for this patient based the use of the NOMS functional outcome to quantify this patient's level of swallowing impairment. Using the NOMS, the patient was determined to be at level 7 for swallow function which correlates with the CH= 0% level of severity. Based on the objective assessment provided within this note, the current, goal, and discharge g-codes are as follows:    Swallow  Swallowing:   Swallow Current Status CH= 0%   Swallow Goal Status CH= 0%   Swallow D/C Status CH= 0%        NOMS Swallowing Levels:  Level 1 (CN): NPO  Level 2 (CM): NPO but takes consistency in therapy  Level 3 (CL): Takes less than 50% of nutrition p.o. and continues with nonoral feedings; and/or safe with mod cues; and/or max diet restriction  Level 4 (CK): Safe swallow but needs mod cues; and/or mod diet restriction; and/or still requires some nonoral feeding/supplements  Level 5 (CJ): Safe swallow with min diet restriction; and/or needs min cues  Level 6 (CI): Independent with p.o.; rare cues; usually self cues; may need to avoid some foods or needs extra time  Level 7 ([address] [place] [address]): Independent for all p.o.  ROBERT. (2003). National Outcomes Measurement System (NOMS): Adult Speech-Language Pathology User's Guide.        ASSESSMENT :  Based on the objective data described above, the patient presents with functional oropharyngeal swallow. Timely and complete mastication of solids. Pharyngeal swallow initiation was timely, occurring at base of tongue. Adequate hyolaryngeal elevation/excursion with complete airway closure. No penetration/aspiration or pharyngeal residue with any consistency. Of note, patient with gagging as puree was passing through esophagus. All consistencies (thin, puree, cracker, barium tablet) passed through esophagus to stomach without delay. PLAN/RECOMMENDATIONS :  -- Diet as tolerated  -- f/u with GI       COMMUNICATION/EDUCATION:   The above findings and recommendations were discussed with: patient who verbalized understanding.     Thank you for this referral.  Ashley Thompson M.S. CCC-SLP  Time Calculation: 15 mins

## 2017-08-18 ENCOUNTER — TELEPHONE (OUTPATIENT)
Dept: FAMILY MEDICINE CLINIC | Age: 67
End: 2017-08-18

## 2017-08-18 NOTE — TELEPHONE ENCOUNTER
Una Smith with Dr. Galindo Palumbo office is calling, she would like to request a call back about a medication change that Dr. Ronny Whaley is requesting approval for. The patient has been seen in Dr. Galindo Palumbo office and has been scheduled for an upper endoscopy, however she is currently on a regimen of 325mg of aspirin a day, Dr. Ronny Whaley is requesting that the patient change the aspirin to 81mg a day the week before the procedure.      Best call back # for [phone]

## 2017-08-21 NOTE — TELEPHONE ENCOUNTER
Call to [facility] with Dr. Candice Burt office. Left message stating okay for patient to decrease 325mg to 81mg one week before surgery and will defer to Dr. Mirta Correia on when to increase post procedure.  Advised to call back if there are any issues/questions

## 2017-08-21 NOTE — TELEPHONE ENCOUNTER
Okay to reduce daily ASA dose from 325 mg to 81 mg one week prior to EGD (given pt's history of stroke, she should continue to take daily ASA regardless). Defer to Dr. Santo Romo on when she can go back to full dose 325 mg of ASA post-procedure.

## 2017-09-13 ENCOUNTER — TELEPHONE (OUTPATIENT)
Dept: FAMILY MEDICINE CLINIC | Age: 67
End: 2017-09-13

## 2017-09-13 RX ORDER — GUAIFENESIN 100 MG/5ML
81 LIQUID (ML) ORAL DAILY
COMMUNITY
End: 2018-06-07

## 2017-09-19 ENCOUNTER — ANESTHESIA EVENT (OUTPATIENT)
Dept: ENDOSCOPY | Age: 67
End: 2017-09-19
Payer: MEDICARE

## 2017-09-19 ENCOUNTER — ANESTHESIA (OUTPATIENT)
Dept: ENDOSCOPY | Age: 67
End: 2017-09-19
Payer: MEDICARE

## 2017-09-19 ENCOUNTER — HOSPITAL ENCOUNTER (OUTPATIENT)
Age: 67
Setting detail: OUTPATIENT SURGERY
Discharge: HOME | End: 2017-09-19
Attending: INTERNAL MEDICINE | Admitting: INTERNAL MEDICINE
Payer: MEDICARE

## 2017-09-19 VITALS
BODY MASS INDEX: 37.02 KG/M2 | WEIGHT: 209 LBS | OXYGEN SATURATION: 96 % | RESPIRATION RATE: 14 BRPM | HEART RATE: 89 BPM | DIASTOLIC BLOOD PRESSURE: 82 MMHG | TEMPERATURE: 97.6 F | SYSTOLIC BLOOD PRESSURE: 155 MMHG

## 2017-09-19 PROCEDURE — 76060000031 HC ANESTHESIA FIRST 0.5 HR: Performed by: INTERNAL MEDICINE

## 2017-09-19 PROCEDURE — 76040000019: Performed by: INTERNAL MEDICINE

## 2017-09-19 PROCEDURE — 74011250637 HC RX REV CODE- 250/637: Performed by: ANESTHESIOLOGY

## 2017-09-19 PROCEDURE — 74011250636 HC RX REV CODE- 250/636

## 2017-09-19 PROCEDURE — 77030011992 HC AIRWY NASOPHGL TELE -A: Performed by: ANESTHESIOLOGY

## 2017-09-19 PROCEDURE — 74011000250 HC RX REV CODE- 250

## 2017-09-19 RX ORDER — ATROPINE SULFATE 0.1 MG/ML
0.5 INJECTION INTRAVENOUS
Status: CANCELLED | OUTPATIENT
Start: 2017-09-19 | End: 2017-09-19

## 2017-09-19 RX ORDER — NALOXONE HYDROCHLORIDE 0.4 MG/ML
0.4 INJECTION, SOLUTION INTRAMUSCULAR; INTRAVENOUS; SUBCUTANEOUS
Status: CANCELLED | OUTPATIENT
Start: 2017-09-19 | End: 2017-09-19

## 2017-09-19 RX ORDER — DEXTROMETHORPHAN/PSEUDOEPHED 2.5-7.5/.8
1.2 DROPS ORAL
Status: CANCELLED | OUTPATIENT
Start: 2017-09-19

## 2017-09-19 RX ORDER — EPINEPHRINE 0.1 MG/ML
1 INJECTION INTRACARDIAC; INTRAVENOUS
Status: CANCELLED | OUTPATIENT
Start: 2017-09-19 | End: 2017-09-19

## 2017-09-19 RX ORDER — FLUMAZENIL 0.1 MG/ML
0.2 INJECTION INTRAVENOUS
Status: CANCELLED | OUTPATIENT
Start: 2017-09-19 | End: 2017-09-19

## 2017-09-19 RX ORDER — SODIUM CHLORIDE 9 MG/ML
INJECTION, SOLUTION INTRAVENOUS
Status: DISCONTINUED | OUTPATIENT
Start: 2017-09-19 | End: 2017-09-19 | Stop reason: HOSPADM

## 2017-09-19 RX ORDER — SODIUM CHLORIDE 0.9 % (FLUSH) 0.9 %
5-10 SYRINGE (ML) INJECTION AS NEEDED
Status: CANCELLED | OUTPATIENT
Start: 2017-09-19 | End: 2017-09-19

## 2017-09-19 RX ORDER — LIDOCAINE HYDROCHLORIDE 20 MG/ML
INJECTION, SOLUTION EPIDURAL; INFILTRATION; INTRACAUDAL; PERINEURAL AS NEEDED
Status: DISCONTINUED | OUTPATIENT
Start: 2017-09-19 | End: 2017-09-19 | Stop reason: HOSPADM

## 2017-09-19 RX ORDER — MIDAZOLAM HYDROCHLORIDE 1 MG/ML
.25-1 INJECTION, SOLUTION INTRAMUSCULAR; INTRAVENOUS
Status: CANCELLED | OUTPATIENT
Start: 2017-09-19 | End: 2017-09-19

## 2017-09-19 RX ORDER — SODIUM CHLORIDE 0.9 % (FLUSH) 0.9 %
5-10 SYRINGE (ML) INJECTION EVERY 8 HOURS
Status: CANCELLED | OUTPATIENT
Start: 2017-09-19 | End: 2017-09-19

## 2017-09-19 RX ORDER — PROPOFOL 10 MG/ML
INJECTION, EMULSION INTRAVENOUS AS NEEDED
Status: DISCONTINUED | OUTPATIENT
Start: 2017-09-19 | End: 2017-09-19 | Stop reason: HOSPADM

## 2017-09-19 RX ORDER — ATROPINE SULFATE 0.4 MG/ML
INJECTION, SOLUTION ENDOTRACHEAL; INTRAMEDULLARY; INTRAMUSCULAR; INTRAVENOUS; SUBCUTANEOUS AS NEEDED
Status: DISCONTINUED | OUTPATIENT
Start: 2017-09-19 | End: 2017-09-19 | Stop reason: HOSPADM

## 2017-09-19 RX ORDER — OXYMETAZOLINE HCL 0.05 %
2 SPRAY, NON-AEROSOL (ML) NASAL
Status: COMPLETED | OUTPATIENT
Start: 2017-09-19 | End: 2017-09-19

## 2017-09-19 RX ORDER — SODIUM CHLORIDE 9 MG/ML
100 INJECTION, SOLUTION INTRAVENOUS CONTINUOUS
Status: CANCELLED | OUTPATIENT
Start: 2017-09-19 | End: 2017-09-19

## 2017-09-19 RX ORDER — OMEPRAZOLE 20 MG/1
20 CAPSULE, DELAYED RELEASE ORAL 2 TIMES DAILY
Qty: 60 CAP | Refills: 1 | Status: SHIPPED | OUTPATIENT
Start: 2017-09-19 | End: 2018-06-07

## 2017-09-19 RX ORDER — FENTANYL CITRATE 50 UG/ML
100 INJECTION, SOLUTION INTRAMUSCULAR; INTRAVENOUS
Status: CANCELLED | OUTPATIENT
Start: 2017-09-19 | End: 2017-09-19

## 2017-09-19 RX ORDER — DIPHENHYDRAMINE HYDROCHLORIDE 50 MG/ML
50 INJECTION, SOLUTION INTRAMUSCULAR; INTRAVENOUS ONCE
Status: CANCELLED | OUTPATIENT
Start: 2017-09-19 | End: 2017-09-19

## 2017-09-19 RX ADMIN — OXYMETAZOLINE HYDROCHLORIDE 2 SPRAY: 5 SPRAY NASAL at 16:19

## 2017-09-19 RX ADMIN — LIDOCAINE HYDROCHLORIDE 100 MG: 20 INJECTION, SOLUTION EPIDURAL; INFILTRATION; INTRACAUDAL; PERINEURAL at 15:39

## 2017-09-19 RX ADMIN — PROPOFOL 25 MG: 10 INJECTION, EMULSION INTRAVENOUS at 15:40

## 2017-09-19 RX ADMIN — PROPOFOL 25 MG: 10 INJECTION, EMULSION INTRAVENOUS at 15:45

## 2017-09-19 RX ADMIN — SODIUM CHLORIDE: 9 INJECTION, SOLUTION INTRAVENOUS at 15:39

## 2017-09-19 RX ADMIN — PROPOFOL 50 MG: 10 INJECTION, EMULSION INTRAVENOUS at 15:39

## 2017-09-19 RX ADMIN — PROPOFOL 25 MG: 10 INJECTION, EMULSION INTRAVENOUS at 15:42

## 2017-09-19 RX ADMIN — ATROPINE SULFATE 0.4 MG: 0.4 INJECTION, SOLUTION ENDOTRACHEAL; INTRAMEDULLARY; INTRAMUSCULAR; INTRAVENOUS; SUBCUTANEOUS at 15:50

## 2017-09-19 NOTE — ROUTINE PROCESS
Gamboa Brittni  1950  [id number]    Situation:  Verbal report received from: Ankit Shea RN  Procedure: Procedure(s):  ESOPHAGOGASTRODUODENOSCOPY (EGD)    Background:    Preoperative diagnosis: DYSPHAGIA  Postoperative diagnosis: Distal Esophagitis  Dysphagia    :  Dr. Mathew Neri  Assistant(s): Endoscopy Technician-1: Krysta Deras IV  Endoscopy RN-1: Glenroy Flood RN    Specimens: * No specimens in log *  H. Pylori  no    Assessment:  Intra-procedure medications   Anesthesia gave intra-procedure sedation and medications, see anesthesia flow sheet yes    Intravenous fluids: NS@ KVO     Vital signs stable     Abdominal assessment: round and soft     Recommendation:  Discharge patient per MD order.     Family or Friend   Permission to share finding with family or friend yes

## 2017-09-19 NOTE — H&P
[address], [address] [facility]      History and Physical       NAME:  Caro Bonilla   :   1950   MRN:   [id number]             History of Present Illness:  Patient is a 79 y.o. who is seen for dysphagia. PMH:  Past Medical History:   Diagnosis Date    Allergic rhinitis due to other allergen     Anxiety     Arthritis     Asthma     in younger years - no inhaler use    AVM (arteriovenous malformation) brain 2012    MRI ; stable ischemic changes; hx of CVA    Depression 2009    Esophageal reflux     HTN, goal below 140/90     Hyperlipidemia 2009    IBS (irritable bowel syndrome)     Insomnia     Mixed hyperlipidemia     Obesity, unspecified     Osteopenia     started Fosamax therapy 2015    PFO (patent foramen ovale)     reportedly dx'ed with CVA in     Prediabetes     Sch[facility]ia ([facility] [address]     Skin cancer     squamous cell skin cancer - removed    Stroke ([facility] [address]     no deficits    Sun-damaged skin        PSH:  Past Surgical History:   Procedure Laterality Date    HX ADENOIDECTOMY      HX HEENT Bilateral     surgery for lazy eye    HX HYSTERECTOMY      endometriosis    HX ORTHOPAEDIC      bone spur removed from right foot    HX TONSILLECTOMY         Allergies: Allergies   Allergen Reactions    Bee Venom Protein (Honey Bee) Hives    Voltaren [Diclofenac Sodium] Diarrhea       Home Medications:  Prior to Admission Medications   Prescriptions Last Dose Informant Patient Reported? Taking? CALCIUM CARBONATE/VITAMIN D3 (CALCIUM + D PO) 2017 at Unknown time  Yes Yes   Sig: Take 1,000 mg by mouth daily. CALCIUM CARBONATE/VITAMIN D3 (CALCIUM + D PO) 2017 at Unknown time  Yes Yes   Sig: Take 500 mg by mouth every evening. DOCOSAHEXANOIC ACID/EPA (FISH OIL PO) 2017 at Unknown time  Yes Yes   Sig: Take 1 Cap by mouth two (2) times a day.    FLUoxetine (PROZAC) 40 mg capsule 2017 at Unknown time Yes Yes   Sig: Take 40 mg by mouth daily. aspirin (ASPIRIN) 325 mg tablet Unknown at Unknown time  No No   Sig: Take 1 Tab by mouth daily. aspirin 81 mg chewable tablet 9/18/2017 at Unknown time  Yes Yes   Sig: Take 81 mg by mouth daily. Started 9/10/2017 in place of the 325mg aspirin in preparation for procedure 9/19/2017.   haloperidol (HALDOL) 1 mg tablet 9/18/2017 at Unknown time  No Yes   Sig: Take 1 Tab by mouth daily. hydroCHLOROthiazide (MICROZIDE) 12.5 mg capsule 9/18/2017 at Unknown time  No Yes   Sig: Take 1 Cap by mouth daily. loratadine 10 mg Cap 9/18/2017 at Unknown time  Yes Yes   Sig: Take 10 mg by mouth daily. simvastatin (ZOCOR) 40 mg tablet 9/18/2017 at Unknown time  No Yes   Sig: Take 1 Tab by mouth nightly. Facility-Administered Medications: None       Hospital Medications:  No current facility-administered medications for this encounter.         Social History:  Social History   Substance Use Topics    Smoking status: Never Smoker    Smokeless tobacco: Never Used    Alcohol use No       Family History:  Family History   Problem Relation Age of Onset    Stroke Mother     Heart Disease Father     Other Sister      benign brain tumor/arthritis    Thyroid Disease Sister      goiter    Diabetes Sister              Review of Systems:      Constitutional: negative fever, negative chills, negative weight loss  Eyes:   negative visual changes  ENT:   negative sore throat, tongue or lip swelling  Respiratory:  negative cough, negative dyspnea  Cards:  negative for chest pain, palpitations, lower extremity edema  GI:   See HPI  :  negative for frequency, dysuria  Integument:  negative for rash and pruritus  Heme:  negative for easy bruising and gum/nose bleeding  Musculoskel: negative for myalgias,  back pain and muscle weakness  Neuro: negative for headaches, dizziness, vertigo  Psych:  negative for feelings of anxiety, depression       Objective:   Patient Vitals for the past 8 hrs:   BP Temp Pulse Resp SpO2 Weight   09/19/17 1500 132/66 98.8 °F (37.1 °C) 61 18 94 % 94.8 kg (209 lb)             EXAM:     NEURO-a&o   HEENT-wnl   LUNGS-clear    COR-regular rate and rhythym     ABD-soft , no tenderness, no rebound, good bs     EXT-no edema     Data Review     No results for input(s): WBC, HGB, HCT, PLT, HGBEXT, HCTEXT, PLTEXT in the last 72 hours. No results for input(s): NA, K, CL, CO2, BUN, CREA, GLU, PHOS, CA in the last 72 hours. No results for input(s): SGOT, GPT, AP, TBIL, TP, ALB, GLOB, GGT, AML, LPSE in the last 72 hours. No lab exists for component: AMYP, HLPSE  No results for input(s): INR, PTP, APTT in the last 72 hours. No lab exists for component: INREXT       Assessment:   · dysphagia     Patient Active Problem List   Diagnosis Code    Insomnia G47.00    Depression F32.9    Esophageal reflux K21.9    Obesity, unspecified E66.9    Allergic rhinitis due to other allergen J30.89    Schizophrenia (HCC) F20.9    Anxiety F41.9    Arthritis M19.90    Asthma J45.909    HTN, goal below 140/90 I10    IBS (irritable bowel syndrome) K58.9    Osteopenia M85.80    Prediabetes R73.03    Skin cancer C44.90    Stroke (HCC) I63.9    Sun-damaged skin L57.8    PFO (patent foramen ovale) Q21.1    AVM (arteriovenous malformation) brain Q28.2    Hyperlipidemia E78.5    Advance care planning Z71.89     Plan:   · Endoscopic procedure with MAC     Signed By: Fatimah Haywood.  Susanne Peters MD     9/19/2017  3:40 PM

## 2017-09-19 NOTE — IP AVS SNAPSHOT
[address] [facility] [address] [place] [address] 
[phone] Patient: Elma Gupta 
MRN: [id number]:[id number] You are allergic to the following Allergen Reactions Bee Venom Protein (Honey Bee) Hives Voltaren (Diclofenac Sodium) Diarrhea Recent Documentation Weight BMI OB Status Smoking Status 94.8 kg 37.02 kg/m2 Hysterectomy Never Smoker Emergency Contacts Name Discharge Info Relation Home Work Mobile Rosas Acosta DISCHARGE CAREGIVER [3] Spouse [3] [phone] About your hospitalization You were admitted on:  September 19, 2017 You last received care in the:  [facility] ENDOSCOPY You were discharged on:  September 19, 2017 Unit phone number:  [phone] Why you were hospitalized Your primary diagnosis was:  Not on File Providers Seen During Your Hospitalizations Provider Role Specialty Primary office phone Naveed Rodas MD Attending Provider Gastroenterology [phone] Your Primary Care Physician (PCP) Primary Care Physician Office Phone Office Fax Zhang Morgan [phone] Follow-up Information None Your Appointments Friday October 06, 2017  8:15 AM EDT ROUTINE CARE with Sharrie Goodell, MD  
[facility]) [address] [facility] [address] [place] [address]  
[phone] Current Discharge Medication List  
  
START taking these medications Dose & Instructions Dispensing Information Comments Morning Noon Evening Bedtime  
 omeprazole 20 mg capsule Commonly known as:  PRILOSEC Your last dose was: Your next dose is:    
   
   
 Dose:  20 mg Take 1 Cap by mouth two (2) times a day. Indications: EROSIVE ESOPHAGITIS Quantity:  60 Cap Refills:  1 CONTINUE these medications which have NOT CHANGED Dose & Instructions Dispensing Information Comments Morning Noon Evening Bedtime * aspirin 81 mg chewable tablet Your last dose was: Your next dose is:    
   
   
 Dose:  81 mg Take 81 mg by mouth daily. Started 9/10/2017 in place of the 325mg aspirin in preparation for procedure 9/19/2017. Refills:  0  
     
   
   
   
  
 * aspirin 325 mg tablet Commonly known as:  ASPIRIN Your last dose was: Your next dose is:    
   
   
 Dose:  325 mg Take 1 Tab by mouth daily. Quantity:  365 Tab Refills:  0  
     
   
   
   
  
 * CALCIUM + D PO Your last dose was: Your next dose is:    
   
   
 Dose:  1000 mg Take 1,000 mg by mouth daily. Refills:  0  
     
   
   
   
  
 * CALCIUM + D PO Your last dose was: Your next dose is:    
   
   
 Dose:  500 mg Take 500 mg by mouth every evening. Refills:  0  
     
   
   
   
  
 FISH OIL PO Your last dose was: Your next dose is:    
   
   
 Dose:  1 Cap Take 1 Cap by mouth two (2) times a day. Refills:  0  
     
   
   
   
  
 haloperidol 1 mg tablet Commonly known as:  HALDOL Your last dose was: Your next dose is:    
   
   
 Dose:  1 mg Take 1 Tab by mouth daily. Quantity:  30 Tab Refills:  3  
     
   
   
   
  
 hydroCHLOROthiazide 12.5 mg capsule Commonly known as:  Karyle Spatz Your last dose was: Your next dose is:    
   
   
 Dose:  12.5 mg Take 1 Cap by mouth daily. Quantity:  90 Cap Refills:  1  
     
   
   
   
  
 loratadine 10 mg Cap Your last dose was: Your next dose is:    
   
   
 Dose:  10 mg Take 10 mg by mouth daily. Refills:  0 PROzac 40 mg capsule Generic drug:  FLUoxetine Your last dose was: Your next dose is:    
   
   
 Dose:  40 mg Take 40 mg by mouth daily. Refills:  0  
     
   
   
   
  
 simvastatin 40 mg tablet Commonly known as:  ZOCOR Your last dose was: Your next dose is:    
   
   
 Dose:  40 mg Take 1 Tab by mouth nightly. Quantity:  90 Tab Refills:  1  
     
   
   
   
  
 * Notice: This list has 4 medication(s) that are the same as other medications prescribed for you. Read the directions carefully, and ask your doctor or other care provider to review them with you. Where to Get Your Medications These medications were sent to [facility] [address] [facility], [address] [facility], [address] [address] Phone:  [phone]  
  omeprazole 20 mg capsule Discharge Instructions [address] 
[address] [facility] [address] EGD DISCHARGE INSTRUCTIONS Jose L Roman 
[id number] 
1950 Discomfort: 
Sore throat-  warm salt water gargle 
redness at IV site- apply warm compress to area; if redness or soreness persist- contact your physician Gaseous discomfort- walking, belching will help relieve any discomfort You may not operate a vehicle for 12 hours You may not engage in an occupation involving machinery or appliances for rest of today You may not drink alcoholic beverages for at least 12 hours Avoid making any critical decisions for at least 24 hour DIET You may resume your regular diet  however -  remember your colon is empty and a heavy meal will produce gas. Avoid these foods:  vegetables, fried / greasy foods, carbonated drinks ACTIVITY You may resume your normal daily activities Spend the remainder of the day resting -  avoid any strenuous activity. CALL M.D. ANY SIGN OF Increasing pain, nausea, vomiting Abdominal distension (swelling) New increased bleeding (oral or rectal) Fever (chills) Pain in chest area Shortness of breath Follow-up Instructions: 
 Call Dr. Joaquin Aldridge for any questions or problems. Telephone # [phone] ENDOSCOPY FINDINGS: 
 Your endoscopy showed erosive esophagitis (mild). A prescription for omeprazole twice daily was sent to your pharmacy. Please take this medication for two months. Please follow up in the office in two months. Signed By: Radha Meraz. Sarath Conway MD   
 9/19/2017  4:00 PM 
  
 
 
Discharge Orders None [facility]! Dear Ankit Rodríguez: Thank you for requesting a [employer] account. Our records indicate that you already have an active [employer] account. You can access your account anytime at [email or web address] Did you know that you can access your hospital and ER discharge instructions at any time in [employer]? You can also review all of your test results from your hospital stay or ER visit. Additional Information If you have questions, please visit the Frequently Asked Questions section of the [employer] website at [email or web address] Remember, [employer] is NOT to be used for urgent needs. For medical emergencies, dial 911. Now available from your iPhone and Android! General Information Please provide this summary of care documentation to your next provider. Patient Signature:  ____________________________________________________________ Date:  ____________________________________________________________  
  
Ju Spare Provider Signature:  ____________________________________________________________ Date:  ____________________________________________________________

## 2017-09-19 NOTE — PROCEDURES
[facility], [address] [place] [address]          Esophago- Gastroduodenoscopy (EGD) Procedure Note    Teddy Greene  1950  [id number]      Procedure: Endoscopic Gastroduodenoscopy --diagnostic    Indication: dysphagia    Pre-operative Diagnosis: see indication above    Post-operative Diagnosis: see findings below    : Lisa Mitchell. Jess Cruz MD    Referring Provider:  Matthew Hopkins MD      Anesthesia/Sedation:  MAC anesthesia Propofol        Procedure Details     After informed consent was obtained for the procedure, with all risks and benefits of procedure explained the patient was taken to the endoscopy suite and placed in the left lateral decubitus position. Following sequential administration of sedation as per above, the endoscope was inserted into the mouth and advanced under direct vision to second portion of the duodenum. A careful inspection was made as the gastroscope was withdrawn, including a retroflexed view of the proximal stomach; findings and interventions are described below. Findings:   Esophagus: in the distal esophagus, LA Class A esophagitis was present  Stomach: a small hiatal hernia was present  Duodenum: normal to second portion      Therapies: none    Specimens: none  EBL: None      Complications: The procedure was terminated due to respiratory distress. Please see anesthesia notes for details. Impression:    1. LA Class A esophagitis  2. Small hiatal hernia    Recommendations:  1. BID PPI for two months  2. Office follow up in two months or sooner if needed    Signed By: Lisa Mitchell.  Jess Cruz MD     9/19/2017  3:56 PM

## 2017-09-19 NOTE — ANESTHESIA PREPROCEDURE EVALUATION
Anesthetic History   No history of anesthetic complications            Review of Systems / Medical History  Patient summary reviewed, nursing notes reviewed and pertinent labs reviewed    Pulmonary            Asthma        Neuro/Psych       CVA  Psychiatric history     Cardiovascular    Hypertension                   GI/Hepatic/Renal     GERD           Endo/Other    Diabetes    Obesity and arthritis     Other Findings            Physical Exam    Airway  Mallampati: II  TM Distance: > 6 cm  Neck ROM: normal range of motion   Mouth opening: Normal     Cardiovascular  Regular rate and rhythm,  S1 and S2 normal,  no murmur, click, rub, or gallop             Dental  No notable dental hx       Pulmonary  Breath sounds clear to auscultation               Abdominal  GI exam deferred       Other Findings            Anesthetic Plan    ASA: 3  Anesthesia type: MAC            Anesthetic plan and risks discussed with: Patient

## 2017-09-19 NOTE — DISCHARGE INSTRUCTIONS
[address]  [address], [facility]    EGD DISCHARGE INSTRUCTIONS    Gabrielle Loaiza  [id number]  1950    Discomfort:  Sore throat-  warm salt water gargle  redness at IV site- apply warm compress to area; if redness or soreness persist- contact your physician  Gaseous discomfort- walking, belching will help relieve any discomfort  You may not operate a vehicle for 12 hours  You may not engage in an occupation involving machinery or appliances for rest of today  You may not drink alcoholic beverages for at least 12 hours  Avoid making any critical decisions for at least 24 hour  DIET  You may resume your regular diet - however -  remember your colon is empty and a heavy meal will produce gas. Avoid these foods:  vegetables, fried / greasy foods, carbonated drinks    ACTIVITY  You may resume your normal daily activities   Spend the remainder of the day resting -  avoid any strenuous activity. CALL M.D. ANY SIGN OF   Increasing pain, nausea, vomiting  Abdominal distension (swelling)  New increased bleeding (oral or rectal)  Fever (chills)  Pain in chest area    Shortness of breath    Follow-up Instructions:   Call Dr. Dusty Butcher for any questions or problems. Telephone # [phone]    ENDOSCOPY FINDINGS:   Your endoscopy showed erosive esophagitis (mild). A prescription for omeprazole twice daily was sent to your pharmacy. Please take this medication for two months. Please follow up in the office in two months. Signed By: Fifi Smith.  Margaux Bansal MD     9/19/2017  4:00 PM

## 2017-09-19 NOTE — PROGRESS NOTES
Initial RN admission and assessment performed and documented in Endoscopy navigator. Patient evaluated by anesthesia in pre-procedure holding. All procedural vital signs, airway assessment, and level of consciousness information monitored and recorded by anesthesia staff on the anesthesia record. Report received from CRNA post procedure. Patient transported to recovery area by RN. Endoscope will be pre-cleaned at bedside immediately following procedure by Guanakito Kapoor and Ruth Thompson.

## 2017-09-20 NOTE — ANESTHESIA POSTPROCEDURE EVALUATION
Post-Anesthesia Evaluation and Assessment    Patient: Author Parish MRN: [id number]  SSN: [id number]    YOB: 1950  Age: 79 y.o. Sex: female       Cardiovascular Function/Vital Signs  Visit Vitals    /82    Pulse 89    Temp 36.4 °C (97.6 °F)    Resp 14    Wt 94.8 kg (209 lb)    SpO2 96%    BMI 37.02 kg/m2       Patient is status post MAC anesthesia for Procedure(s):  ESOPHAGOGASTRODUODENOSCOPY (EGD). Nausea/Vomiting: None    Postoperative hydration reviewed and adequate. Pain:  Pain Scale 1: Numeric (0 - 10) (09/19/17 1631)  Pain Intensity 1: 0 (09/19/17 1631)   Managed    Neurological Status: At baseline    Mental Status and Level of Consciousness: Arousable    Pulmonary Status:   O2 Device: Room air (09/19/17 1631)   Adequate oxygenation and airway patent    Complications related to anesthesia: None    Post-anesthesia assessment completed.  No concerns    Signed By: Celia Berumen MD     September 20, 2017

## 2017-10-06 ENCOUNTER — OFFICE VISIT (OUTPATIENT)
Dept: FAMILY MEDICINE CLINIC | Age: 67
End: 2017-10-06

## 2017-10-06 VITALS
HEART RATE: 64 BPM | BODY MASS INDEX: 36.96 KG/M2 | WEIGHT: 208.6 LBS | DIASTOLIC BLOOD PRESSURE: 68 MMHG | OXYGEN SATURATION: 97 % | TEMPERATURE: 98.6 F | RESPIRATION RATE: 18 BRPM | HEIGHT: 63 IN | SYSTOLIC BLOOD PRESSURE: 138 MMHG

## 2017-10-06 DIAGNOSIS — I10 HTN, GOAL BELOW 140/90: Primary | ICD-10-CM

## 2017-10-06 NOTE — PROGRESS NOTES
Patient Name: Alma Abebe   MRN: [id number]    Vamsi Baird is a 79 y.o. female who presents with the following: The patient has hypertension. She reports taking medications as instructed, no medication side effects noted, patient does not perform home BP monitoring, no TIA's, no dyspnea on exertion, no swelling of ankles, no orthostatic dizziness or lightheadedness. Diet and Lifestyle: generally follows a low fat low cholesterol diet, generally follows a low sodium diet, sedentary. Lab review: no lab studies available for review at time of visit. I have reviewed/discussed the above normal BMI with the patient. I have recommended the following interventions: dietary management education, guidance, and counseling, encourage exercise and monitor weight . Sandor Narvaez Recently had EGD due to dysphagia. Findings shown erosive esophagitis and small hiatal hernia. Symptoms improved with PPI; has GI f/u in Nov.      Review of Systems   Constitutional: Negative for fever, malaise/fatigue and weight loss. Respiratory: Negative for cough, hemoptysis, shortness of breath and wheezing. Cardiovascular: Negative for chest pain, palpitations, leg swelling and PND. Gastrointestinal: Negative for abdominal pain, constipation, diarrhea, nausea and vomiting. The patient's medications, allergies, past medical history, surgical history, family history and social history were reviewed and updated where appropriate. Prior to Admission medications    Medication Sig Start Date End Date Taking? Authorizing Provider   omeprazole (PRILOSEC) 20 mg capsule Take 1 Cap by mouth two (2) times a day. Indications: EROSIVE ESOPHAGITIS 9/19/17  Yes Naveed Gilliland MD   DOCOSAHEXANOIC ACID/EPA (FISH OIL PO) Take 1 Cap by mouth two (2) times a day. Yes Historical Provider   CALCIUM CARBONATE/VITAMIN D3 (CALCIUM + D PO) Take 1,000 mg by mouth daily.    Yes Historical Provider   simvastatin (ZOCOR) 40 mg tablet Take 1 Tab by mouth nightly. 7/14/17  Yes Wilmer Bautista MD   hydroCHLOROthiazide (MICROZIDE) 12.5 mg capsule Take 1 Cap by mouth daily. 7/14/17  Yes Wilmer Bautista MD   haloperidol (HALDOL) 1 mg tablet Take 1 Tab by mouth daily. 3/20/15  Yes Ruma Greenberg MD   aspirin (ASPIRIN) 325 mg tablet Take 1 Tab by mouth daily. 6/13/13  Yes Colin Domingo MD   FLUoxetine (PROZAC) 40 mg capsule Take 40 mg by mouth daily. Yes Historical Provider   loratadine 10 mg Cap Take 10 mg by mouth daily. Yes Historical Provider   aspirin 81 mg chewable tablet Take 81 mg by mouth daily. Started 9/10/2017 in place of the 325mg aspirin in preparation for procedure 9/19/2017. Historical Provider       Allergies   Allergen Reactions    Bee Venom Protein (Honey Bee) Hives    Voltaren [Diclofenac Sodium] Diarrhea           OBJECTIVE      Visit Vitals    /68 (BP 1 Location: Right arm, BP Patient Position: Sitting)    Pulse 64    Temp 98.6 °F (37 °C) (Oral)    Resp 18    Ht 5' 3\" (1.6 m)    Wt 208 lb 9.6 oz (94.6 kg)    SpO2 97%    BMI 36.95 kg/m2       Physical Exam   Constitutional: She is oriented to person, place, and time and well-developed, well-nourished, and in no distress. No distress. HENT:   Head: Normocephalic and atraumatic. Right Ear: External ear normal.   Left Ear: External ear normal.   Eyes: Conjunctivae and EOM are normal. Pupils are equal, round, and reactive to light. Neurological: She is alert and oriented to person, place, and time. Gait normal.   Skin: She is not diaphoretic. Psychiatric: Mood, memory, affect and judgment normal.   Nursing note and vitals reviewed. ASSESSMENT AND PLAN  Christophe Tena is a 79 y.o. female who presents today for:    1. HTN, goal below 140/90  Stable, continue current treatment.        Medications Discontinued During This Encounter   Medication Reason    CALCIUM CARBONATE/VITAMIN D3 (CALCIUM + D PO) Duplicate Order       Follow-up Disposition:  Return in about 6 months (around 4/6/2018) for HTN follow up. Medication risks/benefits/costs/interactions/alternatives discussed with patient. Advised patient to call back or return to office if symptoms worsen/change/persist. If patient cannot reach us or should anything more severe/urgent arise he/she should proceed directly to the nearest emergency department. Discussed expected course/resolution/complications of diagnosis in detail with patient. Patient given a written after visit summary which includes his/her diagnoses, current medications and vitals. Patient expressed understanding with the diagnosis and plan.      Celestino Ross M.D.

## 2017-10-06 NOTE — MR AVS SNAPSHOT
Visit Information Date & Time Provider Department Dept. Phone Encounter #  
 10/6/2017  8:15 AM Eligah Schirmer, MD [facility] [phone] [id number] Follow-up Instructions Return in about 6 months (around 4/6/2018) for HTN follow up. Your Appointments 1/11/2018 10:30 AM  
ROUTINE CARE with Eligah Schirmer, MD  
[facility]) Appt Note: 6mo follow up  
 [address] [phone]  
[phone]  
  
   
 [address] [address] Upcoming Health Maintenance Date Due  
 GLAUCOMA SCREENING Q2Y 3/31/2017 Pneumococcal 65+ High/Highest Risk (2 of 2 - PCV13) 7/22/2017 MEDICARE YEARLY EXAM 7/11/2018 BREAST CANCER SCRN MAMMOGRAM 8/4/2018 COLONOSCOPY 9/16/2025 DTaP/Tdap/Td series (2 - Td) 3/8/2026 Allergies as of 10/6/2017  Review Complete On: 10/6/2017 By: Blanca Frank Severity Noted Reaction Type Reactions Bee Venom Protein (Honey Bee)  09/19/2017    Hives Voltaren [Diclofenac Sodium]  09/21/2015    Diarrhea Current Immunizations  Reviewed on 7/22/2016 Name Date Hep B Vaccine (Adult) 10/8/2015  2:40 PM, 9/9/2015 Influenza Vaccine 10/26/2015 Influenza Vaccine Whole 10/16/2009 Pneumococcal Polysaccharide (PPSV-23) 7/22/2016, 3/20/2015 Tdap 3/8/2016 Zoster Vaccine, Live 7/22/2016 Not reviewed this visit Vitals BP Pulse Temp Resp Height(growth percentile) Weight(growth percentile)  
 138/68 (BP 1 Location: Right arm, BP Patient Position: Sitting) 64 98.6 °F (37 °C) (Oral) 18 5' 3\" (1.6 m) 208 lb 9.6 oz (94.6 kg) SpO2 BMI OB Status Smoking Status 97% 36.95 kg/m2 Hysterectomy Never Smoker Vitals History BMI and BSA Data Body Mass Index Body Surface Area  
 36.95 kg/m 2 2.05 m 2 Preferred Pharmacy Pharmacy Name Phone [facility] [address] [place] [address], [address] [place] [address] [phone] Your Updated Medication List  
  
   
This list is accurate as of: 10/6/17  8:52 AM.  Always use your most recent med list.  
  
  
  
  
 * aspirin 81 mg chewable tablet Take 81 mg by mouth daily. Started 9/10/2017 in place of the 325mg aspirin in preparation for procedure 9/19/2017. * aspirin 325 mg tablet Commonly known as:  ASPIRIN Take 1 Tab by mouth daily. CALCIUM + D PO Take 1,000 mg by mouth daily. FISH OIL PO Take 1 Cap by mouth two (2) times a day.  
  
 haloperidol 1 mg tablet Commonly known as:  HALDOL Take 1 Tab by mouth daily. hydroCHLOROthiazide 12.5 mg capsule Commonly known as:  Alicia [place] Take 1 Cap by mouth daily. loratadine 10 mg Cap Take 10 mg by mouth daily. omeprazole 20 mg capsule Commonly known as:  PRILOSEC Take 1 Cap by mouth two (2) times a day. Indications: EROSIVE ESOPHAGITIS  
  
 PROzac 40 mg capsule Generic drug:  FLUoxetine Take 40 mg by mouth daily. simvastatin 40 mg tablet Commonly known as:  ZOCOR Take 1 Tab by mouth nightly. * Notice: This list has 2 medication(s) that are the same as other medications prescribed for you. Read the directions carefully, and ask your doctor or other care provider to review them with you. Follow-up Instructions Return in about 6 months (around 4/6/2018) for HTN follow up. Patient Instructions DASH Diet: Care Instructions Your Care Instructions The DASH diet is an eating plan that can help lower your blood pressure. DASH stands for Dietary Approaches to Stop Hypertension. Hypertension is high blood pressure. The DASH diet focuses on eating foods that are high in calcium, potassium, and magnesium. These nutrients can lower blood pressure.  The foods that are highest in these nutrients are fruits, vegetables, low-fat dairy products, nuts, seeds, and legumes. But taking calcium, potassium, and magnesium supplements instead of eating foods that are high in those nutrients does not have the same effect. The DASH diet also includes whole grains, fish, and poultry. The DASH diet is one of several lifestyle changes your doctor may recommend to lower your high blood pressure. Your doctor may also want you to decrease the amount of sodium in your diet. Lowering sodium while following the DASH diet can lower blood pressure even further than just the DASH diet alone. Follow-up care is a key part of your treatment and safety. Be sure to make and go to all appointments, and call your doctor if you are having problems. It's also a good idea to know your test results and keep a list of the medicines you take. How can you care for yourself at home? Following the DASH diet · Eat 4 to 5 servings of fruit each day. A serving is 1 medium-sized piece of fruit, ½ cup chopped or canned fruit, 1/4 cup dried fruit, or 4 ounces (½ cup) of fruit juice. Choose fruit more often than fruit juice. · Eat 4 to 5 servings of vegetables each day. A serving is 1 cup of lettuce or raw leafy vegetables, ½ cup of chopped or cooked vegetables, or 4 ounces (½ cup) of vegetable juice. Choose vegetables more often than vegetable juice. · Get 2 to 3 servings of low-fat and fat-free dairy each day. A serving is 8 ounces of milk, 1 cup of yogurt, or 1 ½ ounces of cheese. · Eat 6 to 8 servings of grains each day. A serving is 1 slice of bread, 1 ounce of dry cereal, or ½ cup of cooked rice, pasta, or cooked cereal. Try to choose whole-grain products as much as possible. · Limit lean meat, poultry, and fish to 2 servings each day. A serving is 3 ounces, about the size of a deck of cards. · Eat 4 to 5 servings of nuts, seeds, and legumes (cooked dried beans, lentils, and split peas) each week.  A serving is 1/3 cup of nuts, 2 tablespoons of seeds, or ½ cup of cooked beans or peas. · Limit fats and oils to 2 to 3 servings each day. A serving is 1 teaspoon of vegetable oil or 2 tablespoons of salad dressing. · Limit sweets and added sugars to 5 servings or less a week. A serving is 1 tablespoon jelly or jam, ½ cup sorbet, or 1 cup of lemonade. · Eat less than 2,300 milligrams (mg) of sodium a day. If you limit your sodium to 1,500 mg a day, you can lower your blood pressure even more. Tips for success · Start small. Do not try to make dramatic changes to your diet all at once. You might feel that you are missing out on your favorite foods and then be more likely to not follow the plan. Make small changes, and stick with them. Once those changes become habit, add a few more changes. · Try some of the following: ¨ Make it a goal to eat a fruit or vegetable at every meal and at snacks. This will make it easy to get the recommended amount of fruits and vegetables each day. ¨ Try yogurt topped with fruit and nuts for a snack or healthy dessert. ¨ Add lettuce, tomato, cucumber, and onion to sandwiches. ¨ Combine a ready-made pizza crust with low-fat mozzarella cheese and lots of vegetable toppings. Try using tomatoes, squash, spinach, broccoli, carrots, cauliflower, and onions. ¨ Have a variety of cut-up vegetables with a low-fat dip as an appetizer instead of chips and dip. ¨ Sprinkle sunflower seeds or chopped almonds over salads. Or try adding chopped walnuts or almonds to cooked vegetables. ¨ Try some vegetarian meals using beans and peas. Add garbanzo or kidney beans to salads. Make burritos and tacos with mashed molina beans or black beans. Where can you learn more? Go to http://becka-derrick.info/. Enter M908 in the search box to learn more about \"DASH Diet: Care Instructions. \" Current as of: April 3, 2017 Content Version: 11.3 © 9227-7359 [employer].  Care instructions adapted under license by [address] (which disclaims liability or warranty for this information). If you have questions about a medical condition or this instruction, always ask your healthcare professional. [address] any warranty or liability for your use of this information. Introducing [facility]! Dear Krish García: Thank you for requesting a SurDoc account. Our records indicate that you already have an active SurDoc account. You can access your account anytime at [email or web address] Did you know that you can access your hospital and ER discharge instructions at any time in SurDoc? You can also review all of your test results from your hospital stay or ER visit. Additional Information If you have questions, please visit the Frequently Asked Questions section of the SurDoc website at [email or web address] Remember, SurDoc is NOT to be used for urgent needs. For medical emergencies, dial 911. Now available from your iPhone and Android! Please provide this summary of care documentation to your next provider. Your primary care clinician is listed as Yareli Alamo. If you have any questions after today's visit, please call [phone].

## 2017-10-06 NOTE — PROGRESS NOTES
Chief Complaint   Patient presents with    Hypertension     follow up    Cholesterol Problem     follow up    Diabetes     prediabetes - follow up     1. Have you been to the ER, urgent care clinic since your last visit? Hospitalized since your last visit? No    2. Have you seen or consulted any other health care providers outside of the [address] [place] [address] since your last visit? Include any pap smears or colon screening.  No

## 2017-10-06 NOTE — PATIENT INSTRUCTIONS

## 2018-03-05 ENCOUNTER — OFFICE VISIT (OUTPATIENT)
Dept: FAMILY MEDICINE CLINIC | Age: 68
End: 2018-03-05

## 2018-03-05 VITALS
HEIGHT: 63 IN | BODY MASS INDEX: 37 KG/M2 | TEMPERATURE: 98.1 F | HEART RATE: 72 BPM | SYSTOLIC BLOOD PRESSURE: 142 MMHG | OXYGEN SATURATION: 97 % | RESPIRATION RATE: 18 BRPM | WEIGHT: 208.8 LBS | DIASTOLIC BLOOD PRESSURE: 72 MMHG

## 2018-03-05 DIAGNOSIS — Z86.73 HISTORY OF STROKE: ICD-10-CM

## 2018-03-05 DIAGNOSIS — I10 HTN, GOAL BELOW 140/90: Primary | ICD-10-CM

## 2018-03-05 DIAGNOSIS — K22.10 EROSIVE ESOPHAGITIS: ICD-10-CM

## 2018-03-05 DIAGNOSIS — F20.89 OTHER SCHIZOPHRENIA (HCC): ICD-10-CM

## 2018-03-05 DIAGNOSIS — M54.2 NECK PAIN ON RIGHT SIDE: ICD-10-CM

## 2018-03-05 DIAGNOSIS — L98.9 SKIN LESION: ICD-10-CM

## 2018-03-05 DIAGNOSIS — E78.5 HYPERLIPIDEMIA, UNSPECIFIED HYPERLIPIDEMIA TYPE: ICD-10-CM

## 2018-03-05 NOTE — ASSESSMENT & PLAN NOTE
This condition is managed by Specialist.  Key Psychotherapeutic Meds             haloperidol (HALDOL) 1 mg tablet  (Taking) Take 1 Tab by mouth daily. FLUoxetine (PROZAC) 40 mg capsule  (Taking) Take 40 mg by mouth daily. Other [address] [place] [address]     The patient is on no other behavioral health meds.         Lab Results   Component Value Date/Time    Sodium 140 07/10/2017 03:52 PM    Creatinine 0.87 07/10/2017 03:52 PM    ALT (SGPT) 22 07/10/2017 03:52 PM    AST (SGOT) 17 07/10/2017 03:52 PM

## 2018-03-05 NOTE — PROGRESS NOTES
Chief Complaint   Patient presents with    Cholesterol Problem     follow up     1. Have you been to the ER, urgent care clinic since your last visit? Hospitalized since your last visit? No    2. Have you seen or consulted any other health care providers outside of the [address] [place] [address] since your last visit? Include any pap smears or colon screening.  No

## 2018-03-05 NOTE — MR AVS SNAPSHOT
[address] [facility] 
 
 
 [address] [place] [address] 
[phone] Patient: Kassandra Honeycutt 
MRN: [id number] Visit Information Date & Time Provider Department Dept. Phone Encounter #  
 3/5/2018  9:45 AM Magalys Manley MD [facility] [phone] [id number] Follow-up Instructions Return in about 3 months (around 6/5/2018) for HTN follow up, Medicare Wellness Visit (30 min). Your Appointments 6/7/2018 10:30 AM  
ROUTINE CARE with Magalys Manley MD  
[facility]) Appt Note: 6mo follow up; 6mo follow up; CPE and 3 months HTN f/u  
 [address] [phone]  
[phone]  
  
   
 [address] [phone] Upcoming Health Maintenance Date Due  
 GLAUCOMA SCREENING Q2Y 3/31/2017 Pneumococcal 65+ High/Highest Risk (2 of 2 - PCV13) 7/22/2017 MEDICARE YEARLY EXAM 7/11/2018 BREAST CANCER SCRN MAMMOGRAM 8/4/2018 COLONOSCOPY 9/16/2025 DTaP/Tdap/Td series (2 - Td) 3/8/2026 Allergies as of 3/5/2018  Review Complete On: 3/5/2018 By: Max Foote Severity Noted Reaction Type Reactions Bee Venom Protein (Honey Bee)  09/19/2017    Hives Voltaren [Diclofenac Sodium]  09/21/2015    Diarrhea Current Immunizations  Reviewed on 7/22/2016 Name Date Hep B Vaccine (Adult) 10/8/2015  2:40 PM, 9/9/2015 Influenza Vaccine 10/26/2015 Influenza Vaccine Whole 10/16/2009 Pneumococcal Polysaccharide (PPSV-23) 7/22/2016, 3/20/2015 Tdap 3/8/2016 Zoster Vaccine, Live 7/22/2016 Not reviewed this visit You Were Diagnosed With   
  
 Codes Comments HTN, goal below 140/90    -  Primary ICD-10-CM: I10 
ICD-9-CM: 401.9 Hyperlipidemia, unspecified hyperlipidemia type     ICD-10-CM: E78.5 ICD-9-CM: 272.4  History of stroke     ICD-10-CM: Z86.73 
ICD-9-CM: V12.54   
 Neck pain on right side     ICD-10-CM: M54.2 ICD-9-CM: 723.1 Skin lesion     ICD-10-CM: L98.9 ICD-9-CM: 709.9 Vitals BP Pulse Temp Resp Height(growth percentile) Weight(growth percentile) 142/72 (BP 1 Location: Right arm, BP Patient Position: Sitting) 72 98.1 °F (36.7 °C) (Oral) 18 5' 3\" (1.6 m) 208 lb 12.8 oz (94.7 kg) SpO2 BMI OB Status Smoking Status 97% 36.99 kg/m2 Hysterectomy Never Smoker Vitals History BMI and BSA Data Body Mass Index Body Surface Area  
 36.99 kg/m 2 2.05 m 2 Preferred Pharmacy Pharmacy Name Phone [facility] [id number] [facility], [address] [place] [address] [phone] Your Updated Medication List  
  
   
This list is accurate as of 3/5/18 10:22 AM.  Always use your most recent med list.  
  
  
  
  
 * aspirin 81 mg chewable tablet Take 81 mg by mouth daily. Started 9/10/2017 in place of the 325mg aspirin in preparation for procedure 9/19/2017. * aspirin 325 mg tablet Commonly known as:  ASPIRIN Take 1 Tab by mouth daily. CALCIUM + D PO Take 1,000 mg by mouth daily. FISH OIL PO Take 1 Cap by mouth two (2) times a day.  
  
 haloperidol 1 mg tablet Commonly known as:  HALDOL Take 1 Tab by mouth daily. hydroCHLOROthiazide 12.5 mg capsule Commonly known as:  Michiel Croak TAKE ONE CAPSULE BY MOUTH DAILY  
  
 loratadine 10 mg Cap Take 10 mg by mouth daily. omeprazole 20 mg capsule Commonly known as:  PRILOSEC Take 1 Cap by mouth two (2) times a day. Indications: EROSIVE ESOPHAGITIS  
  
 PROzac 40 mg capsule Generic drug:  FLUoxetine Take 40 mg by mouth daily. simvastatin 40 mg tablet Commonly known as:  ZOCOR  
TAKE 1 TABLET BY MOUTH NIGHTLY * Notice: This list has 2 medication(s) that are the same as other medications prescribed for you. Read the directions carefully, and ask your doctor or other care provider to review them with you. We Performed the Following LIPID PANEL [51674 CPT(R)] METABOLIC PANEL, COMPREHENSIVE [39319 CPT(R)] REFERRAL TO DERMATOLOGY [REF19 Custom] Comments:  
 Please set up referral and contact patient with appointment information Follow-up Instructions Return in about 3 months (around 6/5/2018) for HTN follow up, Medicare Wellness Visit (30 min). To-Do List   
 03/05/2018 Imaging:  DUPLEX CAROTID BILATERAL Referral Information Referral ID Referred By Referred To  
  
 [id number] Huan Salmeron MD   
   [address] [facility] [address] [place] [address] Avenue Phone: [phone] Fax: [phone] Visits Status Start Date End Date 1 New Request 3/5/18 3/5/19 If your referral has a status of pending review or denied, additional information will be sent to support the outcome of this decision. Introducing [facility]! Dear Chip [place]: Thank you for requesting a [employer] account. Our records indicate that you already have an active [employer] account. You can access your account anytime at [email or web address] Did you know that you can access your hospital and ER discharge instructions at any time in [employer]? You can also review all of your test results from your hospital stay or ER visit. Additional Information If you have questions, please visit the Frequently Asked Questions section of the [employer] website at [email or web address] Remember, [employer] is NOT to be used for urgent needs. For medical emergencies, dial 911. Now available from your iPhone and Android! Please provide this summary of care documentation to your next provider. Your primary care clinician is listed as Yareli Alamo. If you have any questions after today's visit, please call [phone].

## 2018-03-05 NOTE — PROGRESS NOTES
Patient Name: Pedro Luis Boo   MRN: [id number]    Sanjana Lincoln is a 79 y.o. female who presents with the following: The patient has hypertension, hyperlipidemia and history of prior stroke. She reports taking medications as instructed, no medication side effects noted, no TIA's, no chest pain on exertion, no dyspnea on exertion, no swelling of ankles, no orthostatic dizziness or lightheadedness, no palpitations. Diet and Lifestyle: generally follows a low fat low cholesterol diet, generally follows a low sodium diet, sedentary. Lab review: orders written for new lab studies as appropriate; see orders. Patient concerned regarding right neck pain that has been intermittent for the past few months. States that it will lasts for about 3 days before self resolving. She is concerned about it given her history of stroke. Per chart review, she may have had a stroke secondary to severe hypertension but also reportedly had a PFO. Previously seen by cardiology who recommended PFO closure only if she has had recurrent strokelike symptoms. Overdue for dental follow-up. Patient admits that she grinds her teeth at night. Denies any vision changes, no slurred speech, neuro deficits, headache, difficulty swallowing. Followed by gastroenterology for dysphagia which workup was notable for erosive esophagitis and a small hiatal hernia. Symptoms well controlled with PPI use every other day; has follow-up with GI. Has had a small dark lesion along her right neck which has become bigger over the years. Review of Systems   Constitutional: Negative for fever, malaise/fatigue and weight loss. Eyes: Negative for blurred vision and photophobia. Respiratory: Negative for cough, hemoptysis, shortness of breath and wheezing. Cardiovascular: Negative for chest pain, palpitations, leg swelling and PND. Gastrointestinal: Negative for abdominal pain, constipation, diarrhea, nausea and vomiting. Musculoskeletal: Positive for neck pain. Neurological: Negative for dizziness, tingling, tremors, sensory change, speech change, focal weakness, seizures, loss of consciousness and headaches. The patient's medications, allergies, past medical history, surgical history, family history and social history were reviewed and updated where appropriate. Prior to Admission medications    Medication Sig Start Date End Date Taking? Authorizing Provider   hydroCHLOROthiazide (MICROZIDE) 12.5 mg capsule TAKE ONE CAPSULE BY MOUTH DAILY 1/27/18  Yes Perla Aragon MD   simvastatin (ZOCOR) 40 mg tablet TAKE 1 TABLET BY MOUTH NIGHTLY 1/11/18  Yes Yareli Alamo MD   omeprazole (PRILOSEC) 20 mg capsule Take 1 Cap by mouth two (2) times a day. Indications: EROSIVE ESOPHAGITIS 9/19/17  Yes Naveed Miller MD   DOCOSAHEXANOIC ACID/EPA (FISH OIL PO) Take 1 Cap by mouth two (2) times a day. Yes Historical Provider   CALCIUM CARBONATE/VITAMIN D3 (CALCIUM + D PO) Take 1,000 mg by mouth daily. Yes Historical Provider   haloperidol (HALDOL) 1 mg tablet Take 1 Tab by mouth daily. 3/20/15  Yes Aung Lyon MD   aspirin (ASPIRIN) 325 mg tablet Take 1 Tab by mouth daily. 6/13/13  Yes Nevin Chowdary MD   FLUoxetine (PROZAC) 40 mg capsule Take 40 mg by mouth daily. Yes Historical Provider   loratadine 10 mg Cap Take 10 mg by mouth daily. Yes Historical Provider   aspirin 81 mg chewable tablet Take 81 mg by mouth daily. Started 9/10/2017 in place of the 325mg aspirin in preparation for procedure 9/19/2017.     Historical Provider       Allergies   Allergen Reactions    Bee Venom Protein (Honey Bee) Hives    Voltaren [Diclofenac Sodium] Diarrhea           OBJECTIVE    Visit Vitals    /72 (BP 1 Location: Right arm, BP Patient Position: Sitting)    Pulse 72    Temp 98.1 °F (36.7 °C) (Oral)    Resp 18    Ht 5' 3\" (1.6 m)    Wt 208 lb 12.8 oz (94.7 kg)    SpO2 97%    BMI 36.99 kg/m2       Physical Exam   Constitutional: She is oriented to person, place, and time and well-developed, well-nourished, and in no distress. No distress. HENT:   Head: Normocephalic and atraumatic. Right Ear: Tympanic membrane is not perforated and not erythematous. No middle ear effusion. No decreased hearing is noted. Left Ear: Tympanic membrane is not perforated and not erythematous. No middle ear effusion. No decreased hearing is noted. Nose: Nose normal. Right sinus exhibits no maxillary sinus tenderness and no frontal sinus tenderness. Left sinus exhibits no maxillary sinus tenderness and no frontal sinus tenderness. Mouth/Throat: Uvula is midline, oropharynx is clear and moist and mucous membranes are normal.   No tenderness along bilateral TMJ   Eyes: Conjunctivae and EOM are normal. Pupils are equal, round, and reactive to light. Neck: Normal range of motion. Neck supple. Cardiovascular: Normal rate, regular rhythm and normal heart sounds. Exam reveals no gallop and no friction rub. No murmur heard. Pulses:       Carotid pulses are 2+ on the right side, and 2+ on the left side. Pulmonary/Chest: Effort normal and breath sounds normal. No respiratory distress. She has no wheezes. Lymphadenopathy:     She has no cervical adenopathy. Neurological: She is alert and oriented to person, place, and time. She is not agitated and not disoriented. She displays no weakness, no tremor, facial symmetry and normal speech. No cranial nerve deficit or sensory deficit. Gait normal. Gait normal.   Skin: Skin is warm and dry. No rash noted. She is not diaphoretic. Psychiatric: Mood, memory, affect and judgment normal.   Nursing note and vitals reviewed. ASSESSMENT AND PLAN  Raina Fernando is a 79 y.o. female who presents today for:    1. HTN, goal below 140/90  Stable, continue current treatment. 2. Hyperlipidemia, unspecified hyperlipidemia type  Will calculate ASCVD risk score pending labs.   - METABOLIC PANEL, COMPREHENSIVE  - LIPID PANEL    3. History of stroke  Appears to be asymptomatic at this time. 4. Neck pain on right side  Possibly due to TMJ versus teeth grinding. Given history of stroke, will obtain updated carotid Dopplers. Recommend dental f/u. Reviewed signs and symptoms that would indicate a worsening medical condition which would require immediate evaluation and treatment; patient expressed understanding of plan. - DUPLEX CAROTID BILATERAL; Future    5. Skin lesion  - REFERRAL TO DERMATOLOGY    6. Erosive esophagitis  Stable, continue current treatment. 7. Other schizophrenia ([facility] [address]  Stable, continue current treatment. There are no discontinued medications. Follow-up Disposition:  Return in about 3 months (around 6/5/2018) for HTN follow up, Medicare Wellness Visit (30 min). Medication risks/benefits/costs/interactions/alternatives discussed with patient. Advised patient to call back or return to office if symptoms worsen/change/persist. If patient cannot reach us or should anything more severe/urgent arise he/she should proceed directly to the nearest emergency department. Discussed expected course/resolution/complications of diagnosis in detail with patient. Patient given a written after visit summary which includes his/her diagnoses, current medications and vitals. Patient expressed understanding with the diagnosis and plan. Kimberly [email or web address] Diagnoses and all orders for this visit:    1. HTN, goal below 140/90    2. Hyperlipidemia, unspecified hyperlipidemia type  -     METABOLIC PANEL, COMPREHENSIVE  -     LIPID PANEL    3. History of stroke    4. Neck pain on right side  -     DUPLEX CAROTID BILATERAL; Future    5. Skin lesion  -     REFERRAL TO DERMATOLOGY    6. Erosive esophagitis    7. Other schizophrenia [facility])  Assessment & Plan:   This condition is managed by Specialist.  Key Psychotherapeutic Meds             haloperidol (HALDOL) 1 mg tablet  (Taking) Take 1 Tab by mouth daily. FLUoxetine (PROZAC) 40 mg capsule  (Taking) Take 40 mg by mouth daily. Other [address] [place] [address]     The patient is on no other behavioral health meds.         Lab Results   Component Value Date/Time    Sodium 140 07/10/2017 03:52 PM    Creatinine 0.87 07/10/2017 03:52 PM    ALT (SGPT) 22 07/10/2017 03:52 PM    AST (SGOT) 17 07/10/2017 03:52 PM

## 2018-03-06 LAB
ALBUMIN SERPL-MCNC: 4.7 G/DL (ref 3.6–4.8)
ALBUMIN/GLOB SERPL: 1.7 {RATIO} (ref 1.2–2.2)
ALP SERPL-CCNC: 71 IU/L (ref 39–117)
ALT SERPL-CCNC: 22 IU/L (ref 0–32)
AST SERPL-CCNC: 16 IU/L (ref 0–40)
BILIRUB SERPL-MCNC: 0.6 MG/DL (ref 0–1.2)
BUN SERPL-MCNC: 19 MG/DL (ref 8–27)
BUN/CREAT SERPL: 17 (ref 12–28)
CALCIUM SERPL-MCNC: 10.2 MG/DL (ref 8.7–10.3)
CHLORIDE SERPL-SCNC: 98 MMOL/L (ref 96–106)
CHOLEST SERPL-MCNC: 132 MG/DL (ref 100–199)
CO2 SERPL-SCNC: 25 MMOL/L (ref 18–29)
CREAT SERPL-MCNC: 1.1 MG/DL (ref 0.57–1)
GFR SERPLBLD CREATININE-BSD FMLA CKD-EPI: 52 ML/MIN/1.73
GFR SERPLBLD CREATININE-BSD FMLA CKD-EPI: 60 ML/MIN/1.73
GLOBULIN SER CALC-MCNC: 2.7 G/DL (ref 1.5–4.5)
GLUCOSE SERPL-MCNC: 110 MG/DL (ref 65–99)
HDLC SERPL-MCNC: 45 MG/DL
INTERPRETATION, 910389: NORMAL
INTERPRETATION: NORMAL
LDLC SERPL CALC-MCNC: 70 MG/DL (ref 0–99)
PDF IMAGE, 910387: NORMAL
POTASSIUM SERPL-SCNC: 4.5 MMOL/L (ref 3.5–5.2)
PROT SERPL-MCNC: 7.4 G/DL (ref 6–8.5)
SODIUM SERPL-SCNC: 143 MMOL/L (ref 134–144)
TRIGL SERPL-MCNC: 83 MG/DL (ref 0–149)
VLDLC SERPL CALC-MCNC: 17 MG/DL (ref 5–40)

## 2018-03-07 NOTE — PROGRESS NOTES
Please notify patient regarding their test results:    Kidney marker is mildly elevated, possibly due to fasting status on day of lab draw. Encourage patient to drink lots of water. Will monitor over time. Cholesterol numbers appear normal, continue current statin dose.

## 2018-03-07 NOTE — TELEPHONE ENCOUNTER
Call to patient.  verified. informed patient I received a request for medications through [facility]. She states she would like to get scripts through  [facility]  Cancelled scripts at [facility] will send to mail order pharmacy

## 2018-03-07 NOTE — PROGRESS NOTES
Outbound call to patient.  verified. Notified patient of recent lab results. Educated patient on hydration and to increase water intake. Patient verbalized understanding.

## 2018-03-08 RX ORDER — SIMVASTATIN 40 MG/1
TABLET, FILM COATED ORAL
Qty: 90 TAB | Refills: 1 | Status: SHIPPED | OUTPATIENT
Start: 2018-03-08 | End: 2018-05-23 | Stop reason: SDUPTHER

## 2018-03-08 RX ORDER — HYDROCHLOROTHIAZIDE 12.5 MG/1
CAPSULE ORAL
Qty: 90 CAP | Refills: 1 | Status: SHIPPED | OUTPATIENT
Start: 2018-03-08 | End: 2018-03-20 | Stop reason: SDUPTHER

## 2018-03-13 ENCOUNTER — HOSPITAL ENCOUNTER (OUTPATIENT)
Dept: VASCULAR SURGERY | Age: 68
Discharge: HOME | End: 2018-03-13
Attending: FAMILY MEDICINE
Payer: MEDICARE

## 2018-03-13 DIAGNOSIS — M54.2 NECK PAIN ON RIGHT SIDE: ICD-10-CM

## 2018-03-13 PROCEDURE — 93880 EXTRACRANIAL BILAT STUDY: CPT

## 2018-03-13 NOTE — PROCEDURES
[facility]  *** FINAL REPORT ***    Name: Gavin Dickson  MRN: [id number]    Outpatient  : 12 Mar 1950  HIS Order #: [id number]  [address] [facility] [address] Visit #: [id number]  Date: 13 Mar 2018    TYPE OF TEST: Cerebrovascular Duplex    REASON FOR TEST  Cerebrovascular accident    Right Carotid:-             Proximal               Mid                 Distal  cm/s  Systolic  Diastolic  Systolic  Diastolic  Systolic  Diastolic  CCA:     78.8      11.0                            58.0      10.0  Bulb:  ECA:     74.0       5.0  ICA:     56.0      12.0                            51.0      14.0  ICA/CCA:  1.0       1.2    ICA Stenosis:    Right Vertebral:-  Finding: Antegrade  Sys:       42.0  Magalie:        8.0    Right Subclavian:    Left Carotid:-            Proximal                Mid                 Distal  cm/s  Systolic  Diastolic  Systolic  Diastolic  Systolic  Diastolic  CCA:     12.5      12.0                            72.0      10.0  Bulb:  ECA:     82.0       6.0  ICA:     41.0      12.0                            68.0      18.0  ICA/CCA:  0.6       1.2    ICA Stenosis:    Left Vertebral:-  Finding: Antegrade  Sys:       43.0  Magalie:        9.0    Left Subclavian:    INTERPRETATION/FINDINGS  PROCEDURE:  Color duplex ultrasound imaging of extracranial  cerebrovascular arteries. FINDINGS:       Right:  Internal carotid velocity is within normal limits, there  is no observed narrowing of the flow channel on color Doppler imaging,   and no plaque is visualized on B-mode imaging. The common and  external carotid arteries are patent and without evidence of  hemodynamically significant stenosis. Left:  Internal carotid velocity is within normal limits, there  is no observed narrowing of the flow channel on color Doppler imaging,   and no plaque is visualized on B-mode imaging. The common and  external carotid arteries are patent and without evidence of  hemodynamically significant stenosis.     IMPRESSION:  Consistent with no stenosis of the right internal carotid   and no stenosis of the left internal carotid. Vertebrals are patent  with antegrade flow. ADDITIONAL COMMENTS    I have personally reviewed the data relevant to the interpretation of  this  study. TECHNOLOGIST: Gamaliel Niño  Signed: 03/13/2018 02:57 PM    PHYSICIAN: Chandan Antunez MD  Signed: 03/14/2018 07:34 AM

## 2018-03-15 NOTE — PROGRESS NOTES
Released to [address] Good news, the arteries within your neck appear to be normal without any signs of plaque build up or narrowing.

## 2018-03-20 RX ORDER — HYDROCHLOROTHIAZIDE 12.5 MG/1
CAPSULE ORAL
Qty: 90 CAP | Refills: 1 | Status: SHIPPED | OUTPATIENT
Start: 2018-03-20 | End: 2018-08-20 | Stop reason: SDUPTHER

## 2018-03-20 NOTE — TELEPHONE ENCOUNTER
Pharmacy on file verified. Last filled 3/8/18  Last office visit and labs 3/5/18   Upcoming appointment 6/7/18  .   Requested Prescriptions     Pending Prescriptions Disp Refills    hydroCHLOROthiazide (MICROZIDE) 12.5 mg capsule 90 Cap 1     Sig: TAKE ONE CAPSULE BY MOUTH DAILY

## 2018-03-20 NOTE — TELEPHONE ENCOUNTER
Call to patient.  verified.  Patient informed me she cancelled the rx at [facility] and needs it to go to [facility]

## 2018-04-20 ENCOUNTER — TELEPHONE (OUTPATIENT)
Dept: FAMILY MEDICINE CLINIC | Age: 68
End: 2018-04-20

## 2018-04-20 NOTE — TELEPHONE ENCOUNTER
Call to patient.  verified. informed patient 40mg of simvastatin was sent to pharmacy 3/8/18 with 90 day supply and 1 refill.  Advised to call pharmacyShe stated okay

## 2018-04-20 NOTE — TELEPHONE ENCOUNTER
Patient is requesting a medication refill to be sent to pharmacy. Medication is simvastatin (ZOCOR) 40 mg tablet.        Pharmacy on file verified     Best call back [phone]

## 2018-05-23 NOTE — TELEPHONE ENCOUNTER
Patient would like the pharmacy changed for the medication as it is cheaper  Pharmacy on file verified  Requested Prescriptions     Pending Prescriptions Disp Refills    simvastatin (ZOCOR) 40 mg tablet 90 Tab 1     Sig: TAKE 1 TABLET BY MOUTH NIGHTLY

## 2018-05-24 RX ORDER — SIMVASTATIN 40 MG/1
TABLET, FILM COATED ORAL
Qty: 90 TAB | Refills: 1 | Status: SHIPPED | OUTPATIENT
Start: 2018-05-24 | End: 2018-10-10 | Stop reason: SDUPTHER

## 2018-06-07 ENCOUNTER — OFFICE VISIT (OUTPATIENT)
Dept: FAMILY MEDICINE CLINIC | Age: 68
End: 2018-06-07

## 2018-06-07 VITALS
OXYGEN SATURATION: 97 % | DIASTOLIC BLOOD PRESSURE: 70 MMHG | TEMPERATURE: 98.3 F | WEIGHT: 208.2 LBS | BODY MASS INDEX: 36.89 KG/M2 | RESPIRATION RATE: 24 BRPM | HEART RATE: 61 BPM | HEIGHT: 63 IN | SYSTOLIC BLOOD PRESSURE: 132 MMHG

## 2018-06-07 DIAGNOSIS — I10 HTN, GOAL BELOW 140/90: Primary | ICD-10-CM

## 2018-06-07 DIAGNOSIS — R79.89 ELEVATED SERUM CREATININE: ICD-10-CM

## 2018-06-07 DIAGNOSIS — R73.09 ELEVATED GLUCOSE: ICD-10-CM

## 2018-06-07 DIAGNOSIS — R13.10 DYSPHAGIA, UNSPECIFIED TYPE: ICD-10-CM

## 2018-06-07 DIAGNOSIS — Z12.31 VISIT FOR SCREENING MAMMOGRAM: ICD-10-CM

## 2018-06-07 RX ORDER — RANITIDINE 150 MG/1
150 TABLET, FILM COATED ORAL DAILY
Qty: 30 TAB | Refills: 2 | Status: SHIPPED | OUTPATIENT
Start: 2018-06-07 | End: 2018-09-27 | Stop reason: SDUPTHER

## 2018-06-07 NOTE — PROGRESS NOTES
Patient Name: Teddy Greene   MRN: [id number]    Shannan Hernandez is a 76 y.o. female who presents with the following: The patient has hypertension. She reports taking medications as instructed, no medication side effects noted, no TIA's, no chest pain on exertion, no dyspnea on exertion, no swelling of ankles. Diet and Lifestyle: generally follows a low fat low cholesterol diet, generally follows a low sodium diet, exercises sporadically. Lab review: orders written for new lab studies as appropriate; see orders. Had an elevated creatinine level during last visit, was fasting at the time. Does not use NSAIDs regularly. Due for screening mammogram.  History of dysphasia secondary to small hiatal hernia. Was previously on PPI but patient self discontinued due to concern of long-term potential side effects. Occasionally still has some heartburn and is wondering what other alternatives she may take. Review of Systems   Constitutional: Negative for fever, malaise/fatigue and weight loss. Respiratory: Negative for cough, hemoptysis, shortness of breath and wheezing. Cardiovascular: Negative for chest pain, palpitations, leg swelling and PND. Gastrointestinal: Positive for heartburn. Negative for abdominal pain, constipation, diarrhea, nausea and vomiting. The patient's medications, allergies, past medical history, surgical history, family history and social history were reviewed and updated where appropriate. Prior to Admission medications    Medication Sig Start Date End Date Taking? Authorizing Provider   simvastatin (ZOCOR) 40 mg tablet TAKE 1 TABLET BY MOUTH NIGHTLY 5/24/18  Yes Vanita Villa NP   hydroCHLOROthiazide (MICROZIDE) 12.5 mg capsule TAKE ONE CAPSULE BY MOUTH DAILY 3/20/18  Yes Matthew Hopkins MD   omeprazole (PRILOSEC) 20 mg capsule Take 1 Cap by mouth two (2) times a day. Indications: EROSIVE ESOPHAGITIS 9/19/17  Yes Naveed Cruz MD DOCOSAHEXANOIC ACID/EPA (FISH OIL PO) Take 1 Cap by mouth two (2) times a day. Yes Historical Provider   CALCIUM CARBONATE/VITAMIN D3 (CALCIUM + D PO) Take 1,000 mg by mouth daily. Yes Historical Provider   haloperidol (HALDOL) 1 mg tablet Take 1 Tab by mouth daily. 3/20/15  Yes Deidre Sparks MD   aspirin (ASPIRIN) 325 mg tablet Take 1 Tab by mouth daily. 6/13/13  Yes Mallory Mulligan MD   FLUoxetine (PROZAC) 40 mg capsule Take 40 mg by mouth daily. Yes Historical Provider   loratadine 10 mg Cap Take 10 mg by mouth daily. Yes Historical Provider       Allergies   Allergen Reactions    Bee Venom Protein (Honey Bee) Hives    Voltaren [Diclofenac Sodium] Diarrhea           OBJECTIVE    Visit Vitals    /70 (BP 1 Location: Right arm, BP Patient Position: Sitting)    Pulse 61    Temp 98.3 °F (36.8 °C) (Oral)    Resp 24    Ht 5' 3\" (1.6 m)    Wt 208 lb 3.2 oz (94.4 kg)    SpO2 97%    BMI 36.88 kg/m2       Physical Exam   Constitutional: She is oriented to person, place, and time and well-developed, well-nourished, and in no distress. No distress. Eyes: Conjunctivae and EOM are normal. Pupils are equal, round, and reactive to light. Musculoskeletal: Normal range of motion. Neurological: She is alert and oriented to person, place, and time. Gait normal.   Skin: Skin is warm and dry. She is not diaphoretic. Psychiatric: Mood, memory, affect and judgment normal.   Nursing note and vitals reviewed. ASSESSMENT AND PLAN  Sonali Barrios is a 76 y.o. female who presents today for:    1. HTN, goal below 140/90  Stable, continue current treatment. 2. Elevated glucose  - HEMOGLOBIN A1C WITH EAG    3. Elevated serum creatinine  Will recheck with non fasting state; pt will return for labs. - BASIC METABOLIC PANEL (7)    4. Visit for screening mammogram  - [facility] MAMMO BI SCREENING INCL CAD; Future    5. Dysphagia, unspecified type  Will trial H2 blocker.   - raNITIdine (ZANTAC) 150 mg tablet; Take 1 Tab by mouth daily. Dispense: 30 Tab; Refill: 2       Medications Discontinued During This Encounter   Medication Reason    aspirin 81 mg chewable tablet Not A Current Medication    omeprazole (PRILOSEC) 20 mg capsule Not A Current Medication       Follow-up Disposition:  Return in about 6 months (around 12/7/2018) for Medicare Wellness Visit (30 min). Medication risks/benefits/costs/interactions/alternatives discussed with patient. Advised patient to call back or return to office if symptoms worsen/change/persist. If patient cannot reach us or should anything more severe/urgent arise he/she should proceed directly to the nearest emergency department. Discussed expected course/resolution/complications of diagnosis in detail with patient. Patient given a written after visit summary which includes his/her diagnoses, current medications and vitals. Patient expressed understanding with the diagnosis and plan.      Korin Garcia M.D.

## 2018-06-07 NOTE — MR AVS SNAPSHOT
[address] 
[phone] Patient: Valeriano Martinez 
MRN: [id number] DCS:6/31/7835 Visit Information Date & Time Provider Department Dept. Phone Encounter #  
 6/7/2018 10:30 AM Federico Kang  [place] [phone] [id number] Follow-up Instructions Return in about 6 months (around 12/7/2018) for Medicare Wellness Visit (30 min). Upcoming Health Maintenance Date Due  
 GLAUCOMA SCREENING Q2Y 3/31/2017 Pneumococcal 65+ High/Highest Risk (2 of 2 - PCV13) 7/22/2017 BREAST CANCER SCRN MAMMOGRAM 8/4/2018 MEDICARE YEARLY EXAM 7/11/2018 Influenza Age 5 to Adult 8/1/2018 COLONOSCOPY 9/16/2025 DTaP/Tdap/Td series (2 - Td) 3/8/2026 Allergies as of 6/7/2018  Review Complete On: 6/7/2018 By: Federico Kang MD  
  
 Severity Noted Reaction Type Reactions Bee Venom Protein (Honey Bee)  09/19/2017    Hives Voltaren [Diclofenac Sodium]  09/21/2015    Diarrhea Current Immunizations  Reviewed on 7/22/2016 Name Date Hep B Vaccine (Adult) 10/8/2015  2:40 PM, 9/9/2015 Influenza Vaccine 10/26/2015 Influenza Vaccine Whole 10/16/2009 Pneumococcal Polysaccharide (PPSV-23) 7/22/2016, 3/20/2015 Tdap 3/8/2016 Zoster Vaccine, Live 7/22/2016 Not reviewed this visit You Were Diagnosed With   
  
 Codes Comments HTN, goal below 140/90    -  Primary ICD-10-CM: I10 
ICD-9-CM: 401.9 Elevated glucose     ICD-10-CM: R73.09 
ICD-9-CM: 790.29 Elevated serum creatinine     ICD-10-CM: R79.89 ICD-9-CM: 790.99 Visit for screening mammogram     ICD-10-CM: Z12.31 
ICD-9-CM: V76.12 Dysphagia, unspecified type     ICD-10-CM: R13.10 ICD-9-CM: 787.20 Vitals BP Pulse Temp Resp Height(growth percentile) Weight(growth percentile)  132/70 (BP 1 Location: Right arm, BP Patient Position: Sitting) 61 98.3 °F (36.8 °C) (Oral) 24 5' 3\" (1.6 m) 208 lb 3.2 oz (94.4 kg) SpO2 BMI OB Status Smoking Status 97% 36.88 kg/m2 Hysterectomy Never Smoker Vitals History BMI and BSA Data Body Mass Index Body Surface Area  
 36.88 kg/m 2 2.05 m 2 Preferred Pharmacy Pharmacy Name Phone [facility], [address] [place] [address] [phone] Your Updated Medication List  
  
   
This list is accurate as of 6/7/18 10:54 AM.  Always use your most recent med list.  
  
  
  
  
 aspirin 325 mg tablet Commonly known as:  ASPIRIN Take 1 Tab by mouth daily. CALCIUM + D PO Take 1,000 mg by mouth daily. FISH OIL PO Take 1 Cap by mouth two (2) times a day.  
  
 haloperidol 1 mg tablet Commonly known as:  HALDOL Take 1 Tab by mouth daily. hydroCHLOROthiazide 12.5 mg capsule Commonly known as:  Alvie Perches TAKE ONE CAPSULE BY MOUTH DAILY  
  
 loratadine 10 mg Cap Take 10 mg by mouth daily. PROzac 40 mg capsule Generic drug:  FLUoxetine Take 40 mg by mouth daily. raNITIdine 150 mg tablet Commonly known as:  ZANTAC Take 1 Tab by mouth daily. simvastatin 40 mg tablet Commonly known as:  ZOCOR  
TAKE 1 TABLET BY MOUTH NIGHTLY Prescriptions Sent to Pharmacy Refills  
 raNITIdine (ZANTAC) 150 mg tablet 2 Sig: Take 1 Tab by mouth daily. Class: Normal  
 Pharmacy: [facility], [address] Ph #: [phone] Route: Oral  
  
We Performed the Following BASIC METABOLIC PANEL (7) [67615 CPT(R)] HEMOGLOBIN A1C WITH EAG [78738 CPT(R)] Follow-up Instructions Return in about 6 months (around 12/7/2018) for Medicare Wellness Visit (30 min). To-Do List   
 06/07/2018 Imaging:  [facility] MAMMO BI SCREENING INCL CAD Patient Instructions DASH Diet: Care Instructions Your Care Instructions The DASH diet is an eating plan that can help lower your blood pressure. DASH stands for Dietary Approaches to Stop Hypertension. Hypertension is high blood pressure. The DASH diet focuses on eating foods that are high in calcium, potassium, and magnesium. These nutrients can lower blood pressure. The foods that are highest in these nutrients are fruits, vegetables, low-fat dairy products, nuts, seeds, and legumes. But taking calcium, potassium, and magnesium supplements instead of eating foods that are high in those nutrients does not have the same effect. The DASH diet also includes whole grains, fish, and poultry. The DASH diet is one of several lifestyle changes your doctor may recommend to lower your high blood pressure. Your doctor may also want you to decrease the amount of sodium in your diet. Lowering sodium while following the DASH diet can lower blood pressure even further than just the DASH diet alone. Follow-up care is a key part of your treatment and safety. Be sure to make and go to all appointments, and call your doctor if you are having problems. It's also a good idea to know your test results and keep a list of the medicines you take. How can you care for yourself at home? Following the DASH diet · Eat 4 to 5 servings of fruit each day. A serving is 1 medium-sized piece of fruit, ½ cup chopped or canned fruit, 1/4 cup dried fruit, or 4 ounces (½ cup) of fruit juice. Choose fruit more often than fruit juice. · Eat 4 to 5 servings of vegetables each day. A serving is 1 cup of lettuce or raw leafy vegetables, ½ cup of chopped or cooked vegetables, or 4 ounces (½ cup) of vegetable juice. Choose vegetables more often than vegetable juice. · Get 2 to 3 servings of low-fat and fat-free dairy each day. A serving is 8 ounces of milk, 1 cup of yogurt, or 1 ½ ounces of cheese. · Eat 6 to 8 servings of grains each day.  A serving is 1 slice of bread, 1 ounce of dry cereal, or ½ cup of cooked rice, pasta, or cooked cereal. Try to choose whole-grain products as much as possible. · Limit lean meat, poultry, and fish to 2 servings each day. A serving is 3 ounces, about the size of a deck of cards. · Eat 4 to 5 servings of nuts, seeds, and legumes (cooked dried beans, lentils, and split peas) each week. A serving is 1/3 cup of nuts, 2 tablespoons of seeds, or ½ cup of cooked beans or peas. · Limit fats and oils to 2 to 3 servings each day. A serving is 1 teaspoon of vegetable oil or 2 tablespoons of salad dressing. · Limit sweets and added sugars to 5 servings or less a week. A serving is 1 tablespoon jelly or jam, ½ cup sorbet, or 1 cup of lemonade. · Eat less than 2,300 milligrams (mg) of sodium a day. If you limit your sodium to 1,500 mg a day, you can lower your blood pressure even more. Tips for success · Start small. Do not try to make dramatic changes to your diet all at once. You might feel that you are missing out on your favorite foods and then be more likely to not follow the plan. Make small changes, and stick with them. Once those changes become habit, add a few more changes. · Try some of the following: ¨ Make it a goal to eat a fruit or vegetable at every meal and at snacks. This will make it easy to get the recommended amount of fruits and vegetables each day. ¨ Try yogurt topped with fruit and nuts for a snack or healthy dessert. ¨ Add lettuce, tomato, cucumber, and onion to sandwiches. ¨ Combine a ready-made pizza crust with low-fat mozzarella cheese and lots of vegetable toppings. Try using tomatoes, squash, spinach, broccoli, carrots, cauliflower, and onions. ¨ Have a variety of cut-up vegetables with a low-fat dip as an appetizer instead of chips and dip. ¨ Sprinkle sunflower seeds or chopped almonds over salads. Or try adding chopped walnuts or almonds to cooked vegetables. ¨ Try some vegetarian meals using beans and peas. Add garbanzo or kidney beans to salads. Make burritos and tacos with mashed molina beans or black beans. Where can you learn more? Go to http://becka-derrick.info/. Enter E949 in the search box to learn more about \"DASH Diet: Care Instructions. \" Current as of: September 21, 2016 Content Version: 11.4 © 0444-8044 [employer]. Care instructions adapted under license by [employer] (which disclaims liability or warranty for this information). If you have questions about a medical condition or this instruction, always ask your healthcare professional. [address] any warranty or liability for your use of this information. Introducing [facility]! Dear Ady Ascencio: Thank you for requesting a [employer] account. Our records indicate that you already have an active [employer] account. You can access your account anytime at [email or web address] Did you know that you can access your hospital and ER discharge instructions at any time in [employer]? You can also review all of your test results from your hospital stay or ER visit. Additional Information If you have questions, please visit the Frequently Asked Questions section of the [employer] website at [email or web address] Remember, [employer] is NOT to be used for urgent needs. For medical emergencies, dial 911. Now available from your iPhone and Android! Please provide this summary of care documentation to your next provider. Your primary care clinician is listed as Yareli Alamo. If you have any questions after today's visit, please call [phone].

## 2018-06-07 NOTE — PROGRESS NOTES
Chief Complaint   Patient presents with    Hypertension     6mths F/u     1. Have you been to the ER, urgent care clinic since your last visit? Hospitalized since your last visit? No    2. Have you seen or consulted any other health care providers outside of the [address] [place] [address] since your last visit? Include any pap smears or colon screening.  No

## 2018-06-07 NOTE — PATIENT INSTRUCTIONS

## 2018-06-21 LAB
BUN SERPL-MCNC: 14 MG/DL (ref 8–27)
BUN/CREAT SERPL: 14 (ref 12–28)
CHLORIDE SERPL-SCNC: 99 MMOL/L (ref 96–106)
CO2 SERPL-SCNC: 24 MMOL/L (ref 20–29)
CREAT SERPL-MCNC: 0.97 MG/DL (ref 0.57–1)
EST. AVERAGE GLUCOSE BLD GHB EST-MCNC: 105 MG/DL
GFR SERPLBLD CREATININE-BSD FMLA CKD-EPI: 60 ML/MIN/1.73
GFR SERPLBLD CREATININE-BSD FMLA CKD-EPI: 69 ML/MIN/1.73
GLUCOSE SERPL-MCNC: 165 MG/DL (ref 65–99)
HBA1C MFR BLD: 5.3 % (ref 4.8–5.6)
POTASSIUM SERPL-SCNC: 4 MMOL/L (ref 3.5–5.2)
SODIUM SERPL-SCNC: 140 MMOL/L (ref 134–144)

## 2018-08-20 RX ORDER — HYDROCHLOROTHIAZIDE 12.5 MG/1
CAPSULE ORAL
Qty: 90 CAP | Refills: 1 | Status: SHIPPED | OUTPATIENT
Start: 2018-08-20 | End: 2019-01-07 | Stop reason: SDUPTHER

## 2018-12-03 ENCOUNTER — OFFICE VISIT (OUTPATIENT)
Dept: FAMILY MEDICINE CLINIC | Age: 68
End: 2018-12-03

## 2018-12-03 VITALS
BODY MASS INDEX: 36.36 KG/M2 | TEMPERATURE: 98.6 F | OXYGEN SATURATION: 96 % | HEIGHT: 63 IN | DIASTOLIC BLOOD PRESSURE: 66 MMHG | SYSTOLIC BLOOD PRESSURE: 142 MMHG | WEIGHT: 205.2 LBS | RESPIRATION RATE: 18 BRPM | HEART RATE: 78 BPM

## 2018-12-03 DIAGNOSIS — R25.1 TREMOR: ICD-10-CM

## 2018-12-03 DIAGNOSIS — Z13.31 SCREENING FOR DEPRESSION: ICD-10-CM

## 2018-12-03 DIAGNOSIS — R73.09 ELEVATED GLUCOSE: ICD-10-CM

## 2018-12-03 DIAGNOSIS — Z12.31 VISIT FOR SCREENING MAMMOGRAM: ICD-10-CM

## 2018-12-03 DIAGNOSIS — E78.5 HYPERLIPIDEMIA, UNSPECIFIED HYPERLIPIDEMIA TYPE: ICD-10-CM

## 2018-12-03 DIAGNOSIS — E66.01 SEVERE OBESITY (HCC): ICD-10-CM

## 2018-12-03 DIAGNOSIS — I10 HTN, GOAL BELOW 140/90: ICD-10-CM

## 2018-12-03 DIAGNOSIS — Z13.39 SCREENING FOR ALCOHOLISM: ICD-10-CM

## 2018-12-03 DIAGNOSIS — Z00.00 MEDICARE ANNUAL WELLNESS VISIT, SUBSEQUENT: Primary | ICD-10-CM

## 2018-12-03 NOTE — PROGRESS NOTES
Chief Complaint Patient presents with [facility] Annual Wellness Visit   Tremors  
  on right hand only x 3 months 1. Have you been to the ER, urgent care clinic since your last visit? Hospitalized since your last visit? No 
 
2. Have you seen or consulted any other health care providers outside of the [address] [place] [address] since your last visit? Include any pap smears or colon screening.  No

## 2018-12-03 NOTE — PATIENT INSTRUCTIONS
Medicare Wellness Visit, Female The best way to live healthy is to have a lifestyle where you eat a well-balanced diet, exercise regularly, limit alcohol use, and quit all forms of tobacco/nicotine, if applicable. Regular preventive services are another way to keep healthy. Preventive services (vaccines, screening tests, monitoring & exams) can help personalize your care plan, which helps you manage your own care. Screening tests can find health problems at the earliest stages, when they are easiest to treat. [facility] follows the current, evidence-based guidelines published by the [facility] ([facility]) when recommending preventive services for our patients. Because we follow these guidelines, sometimes recommendations change over time as research supports it. (For example, mammograms used to be recommended annually. Even though Medicare will still pay for an annual mammogram, the newer guidelines recommend a mammogram every two years for women of average risk.) Of course, you and your doctor may decide to screen more often for some diseases, based on your risk and your health status. Preventive services for you include: - Medicare offers their members a free annual wellness visit, which is time for you and your primary care provider to discuss and plan for your preventive service needs. Take advantage of this benefit every year! 
-All adults over the age of 72 should receive the recommended pneumonia vaccines. Current USPSTF guidelines recommend a series of two vaccines for the best pneumonia protection.  
-All adults should have a flu vaccine yearly and a tetanus vaccine every 10 years. All adults age 61 and older should receive a shingles vaccine once in their lifetime.   
-A bone mass density test is recommended when a woman turns 65 to screen for osteoporosis. This test is only recommended one time, as a screening. Some providers will use this same test as a disease monitoring tool if you already have osteoporosis. -All adults age 38-68 who are overweight should have a diabetes screening test once every three years.  
-Other screening tests and preventive services for persons with diabetes include: an eye exam to screen for diabetic retinopathy, a kidney function test, a foot exam, and stricter control over your cholesterol.  
-Cardiovascular screening for adults with routine risk involves an electrocardiogram (ECG) at intervals determined by your doctor.  
-Colorectal cancer screenings should be done for adults age 54-65 with no increased risk factors for colorectal cancer. There are a number of acceptable methods of screening for this type of cancer. Each test has its own benefits and drawbacks. Discuss with your doctor what is most appropriate for you during your annual wellness visit. The different tests include: colonoscopy (considered the best screening method), a fecal occult blood test, a fecal DNA test, and sigmoidoscopy. -Breast cancer screenings are recommended every other year for women of normal risk, age 54-69. 
-Cervical cancer screenings for women over age 72 are only recommended with certain risk factors.  
-All adults born between [facility] should be screened once for Hepatitis C. Here is a list of your current Health Maintenance items (your personalized list of preventive services) with a due date: 
Health Maintenance Due Topic Date Due  Shingles Vaccine (1 of 2) 03/12/2000  Glaucoma Screening   03/31/2017  Pneumococcal Vaccine (2 of 2 - PCV13) 07/22/2017  Breast Cancer Screening  08/04/2018 [address] [place] [address] Annual Well Visit  10/15/2018

## 2018-12-03 NOTE — PROGRESS NOTES
This is the Subsequent Medicare Annual Wellness Exam, performed 12 months or more after the Initial AWV or the last Subsequent AWV I have reviewed the patient's medical history in detail and updated the computerized patient record. History Past Medical History:  
Diagnosis Date  Allergic rhinitis due to other allergen  Anxiety  Arthritis  Asthma   
 in younger years - no inhaler use  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 MRI 2013; stable ischemic changes; hx of CVA  Depression 9/23/2009  Esophageal reflux  HTN, goal below 140/90  Hyperlipidemia 9/23/2009  IBS (irritable bowel syndrome)  Insomnia  Mixed hyperlipidemia  Obesity, unspecified  Osteopenia   
 started Fosamax therapy 4/2015  
 PFO (patent foramen ovale) reportedly dx'ed with CVA in 2012  Prediabetes  Schizophrenia ([facility] [address]  Skin cancer   
 squamous cell skin cancer - removed  Stroke [facility])   
 no deficits  Sun-damaged skin Past Surgical History:  
Procedure Laterality Date  HX ADENOIDECTOMY  HX HEENT Bilateral   
 surgery for lazy eye  HX HYSTERECTOMY  1984  
 endometriosis  HX ORTHOPAEDIC    
 bone spur removed from right foot  HX TONSILLECTOMY Current Outpatient Medications Medication Sig Dispense Refill  pneumococcal 13 america conj dip (PREVNAR-13) 0.5 mL syrg injection 0.5 mL by IntraMUSCular route once for 1 dose. Please fax vaccination documentation to [facility] at [phone], Attn: Dr. Jaqueline Montes De Oca 0.5 mL 0  
 simvastatin (ZOCOR) 40 mg tablet TAKE 1 TABLET EVERY NIGHT 90 Tab 3  
 raNITIdine (ZANTAC) 150 mg tablet TAKE ONE TABLET BY MOUTH DAILY 30 Tab 2  
 hydroCHLOROthiazide (MICROZIDE) 12.5 mg capsule TAKE 1 CAPSULE EVERY DAY 90 Cap 1  
 DOCOSAHEXANOIC ACID/EPA (FISH OIL PO) Take 1 Cap by mouth two (2) times a day.     
 CALCIUM CARBONATE/VITAMIN D3 (CALCIUM + D PO) Take 1,000 mg by mouth daily.    
 haloperidol (HALDOL) 1 mg tablet Take 1 Tab by mouth daily. 30 Tab 3  
 aspirin (ASPIRIN) 325 mg tablet Take 1 Tab by mouth daily. 365 Tab 0  
 FLUoxetine (PROZAC) 40 mg capsule Take 40 mg by mouth daily.  loratadine 10 mg Cap Take 10 mg by mouth daily. Allergies Allergen Reactions  Bee Venom Protein (Honey Bee) Hives  Voltaren [Diclofenac Sodium] Diarrhea Family History Problem Relation Age of Onset  Stroke Mother  Heart Disease Father  Other Sister   
     benign brain tumor/arthritis  Thyroid Disease Sister   
     goiter  Diabetes Sister Social History Tobacco Use  Smoking status: Never Smoker  Smokeless tobacco: Never Used Substance Use Topics  Alcohol use: No  
 
Patient Active Problem List  
Diagnosis Code  Insomnia G47.00  Depression F32.9  Esophageal reflux K21.9  Obesity, unspecified E66.9  Allergic rhinitis due to other allergen J30.89  Schizophrenia ([facility] [address] F20.9  Anxiety F41.9  Arthritis M19.90  Asthma J45.909  
 HTN, goal below 140/90 I10  
 IBS (irritable bowel syndrome) K58.9  Osteopenia M85.80  Prediabetes R73.03  
 Skin cancer C44.90  
 History of CVA (cerebrovascular accident) Z80.78  
 Sun-damaged skin L57.8  PFO (patent foramen ovale) Q21.1  AVM (arteriovenous malformation) brain Q28.2  Hyperlipidemia E78.5  Advance care planning Z71.89  Severe obesity (HCC) E66.01 Depression Risk Factor Screening: PHQ over the last two weeks 12/3/2018 Little interest or pleasure in doing things Not at all Feeling down, depressed, irritable, or hopeless Not at all Total Score PHQ 2 0 Alcohol Risk Factor Screening: You do not drink alcohol or very rarely. Functional Ability and Level of Safety:  
Hearing Loss Hearing is good. Activities of Daily Living The home contains: no safety equipment. Patient does total self care Fall Risk Fall Risk Assessment, last 12 mths 6/7/2018 Able to walk? Yes Fall in past 12 months? No  
 
 
Abuse Screen Patient is not abused Cognitive Screening Evaluation of Cognitive Function: 
Has your family/caregiver stated any concerns about your memory: no 
 
 
Patient Care Team  
Patient Care Team: 
Inez Lund MD as PCP - [facility]) Samira Seo MD (Psychiatry) Kameron Hobson MD (Cardiology) Maida Victoria MD as Physician (Gastroenterology) Jonn Beck DO (Dermatology) Assessment/Plan Education and counseling provided: 
Are appropriate based on today's review and evaluation Diagnoses and all orders for this visit: 
 
1. Medicare annual wellness visit, subsequent 2. Screening for depression 
-     [address] 3. Screening for alcoholism -     [place] ANNUAL ALCOHOL SCREEN 15 MIN 4. Visit for screening mammogram 
 
5. HTN, goal below 140/90 6. Hyperlipidemia, unspecified hyperlipidemia type -     METABOLIC PANEL, COMPREHENSIVE 
-     LIPID PANEL 7. Elevated glucose 
-     HEMOGLOBIN A1C WITH EAG 8. Severe obesity ([address] Assessment & Plan: 
Uncontrolled, based on history, physical exam and review of pertinent labs, studies and medications; meds reconciled; lifestyle modifications recommended. Key Obesity Meds   
    
  
 hydroCHLOROthiazide (MICROZIDE) 12.5 mg capsule  (Taking) TAKE 1 CAPSULE EVERY DAY Lab Results Component Value Date/Time Hemoglobin A1c 5.3 06/20/2018 04:26 PM  
 Glucose 165 06/20/2018 04:26 PM  
 Cholesterol, total 132 03/05/2018 10:53 AM  
 HDL Cholesterol 45 03/05/2018 10:53 AM  
 LDL, calculated 70 03/05/2018 10:53 AM  
 Triglyceride 83 03/05/2018 10:53 AM  
 Sodium 140 06/20/2018 04:26 PM  
 Potassium 4.0 06/20/2018 04:26 PM  
 ALT (SGPT) 22 03/05/2018 10:53 AM  
 AST (SGOT) 16 03/05/2018 10:53 AM  
 
 
 
 
 
9. Tremor Other orders -     pneumococcal 13 america conj dip (PREVNAR-13) 0.5 mL syrg injection; 0.5 mL by IntraMUSCular route once for 1 dose. Please fax vaccination documentation to [address] [place] [address] at [phone], Attn: Dr. Jose Khan Health Maintenance Due Topic Date Due  Shingrix Vaccine Age 50> (1 of 2) 03/12/2000  GLAUCOMA SCREENING Q2Y  03/31/2017  Pneumococcal 65+ High/Highest Risk (2 of 2 - PCV13) 07/22/2017  BREAST CANCER SCRN MAMMOGRAM  08/04/2018

## 2018-12-03 NOTE — PROGRESS NOTES
Patient Name: Manuelito Agrawal  
MRN: [id number] SUBJECTIVE Manuelito Agrawal is a 76 y.o. female who presents with the following: The patient has hypertension, hyperlipidemia and pre-dm. She reports taking medications as instructed, no medication side effects noted. Diet and Lifestyle: generally follows a low fat low cholesterol diet, generally follows a low sodium diet, exercises sporadically, no formal exercise but active during the day. Lab review: orders written for new lab studies as appropriate; see orders. Reports 3 months history of a tremor in her right hand. States it is mostly noticeable when she reaches to pick something up. Denies any visible tremor when her hand is at rest but feels like it still shaking. Denies any medication changes. Has not notified her psychiatrist regarding her symptoms. Denies any memory changes or family history of dementia. Review of Systems Constitutional: Negative for fever, malaise/fatigue and weight loss. Respiratory: Negative for cough, hemoptysis, shortness of breath and wheezing. Cardiovascular: Negative for chest pain, palpitations, leg swelling and PND. Gastrointestinal: Negative for abdominal pain, constipation, diarrhea, nausea and vomiting. Neurological: Positive for tremors. Negative for sensory change, seizures, loss of consciousness and headaches. Psychiatric/Behavioral: Negative for depression, memory loss and suicidal ideas. The patient is not nervous/anxious and does not have insomnia. The patient's medications, allergies, past medical history, surgical history, family history and social history were reviewed and updated where appropriate. Prior to Admission medications Medication Sig Start Date End Date Taking?  Authorizing Provider  
simvastatin (ZOCOR) 40 mg tablet TAKE 1 TABLET EVERY NIGHT 10/11/18  Yes Swetha Villa NP  
raNITIdine (ZANTAC) 150 mg tablet TAKE ONE TABLET BY MOUTH DAILY 9/27/18 Yes Shanita Brunner MD  
hydroCHLOROthiazide (MICROZIDE) 12.5 mg capsule TAKE 1 CAPSULE EVERY DAY 8/20/18  Yes Shanita Brunner MD  
DOCOSAHEXANOIC ACID/EPA (FISH OIL PO) Take 1 Cap by mouth two (2) times a day. Yes Provider, Historical  
CALCIUM CARBONATE/VITAMIN D3 (CALCIUM + D PO) Take 1,000 mg by mouth daily. Yes Provider, Historical  
haloperidol (HALDOL) 1 mg tablet Take 1 Tab by mouth daily. 3/20/15  Yes Angélica Washburn MD  
aspirin (ASPIRIN) 325 mg tablet Take 1 Tab by mouth daily. 6/13/13  Yes Natalya Braun MD  
FLUoxetine (PROZAC) 40 mg capsule Take 40 mg by mouth daily. Yes Provider, Historical  
loratadine 10 mg Cap Take 10 mg by mouth daily. Yes Provider, Historical  
 
 
Allergies Allergen Reactions  Bee Venom Protein (Honey Bee) Hives  Voltaren [Diclofenac Sodium] Diarrhea OBJECTIVE Visit Vitals /66 (BP 1 Location: Right arm, BP Patient Position: Sitting) Pulse 78 Temp 98.6 °F (37 °C) (Oral) Resp 18 Ht 5' 2.5\" (1.588 m) Wt 205 lb 3.2 oz (93.1 kg) SpO2 96% BMI 36.93 kg/m² Physical Exam  
Constitutional: She is oriented to person, place, and time and well-developed, well-nourished, and in no distress. No distress. Eyes: Conjunctivae and EOM are normal. Pupils are equal, round, and reactive to light. Musculoskeletal: Normal range of motion. Neurological: She is alert and oriented to person, place, and time. She has normal sensation and normal strength. She is not agitated and not disoriented. She displays no weakness, no atrophy, no tremor (none seent with action or rest), facial symmetry and normal speech. No cranial nerve deficit or sensory deficit. She exhibits normal muscle tone. She has a normal Straight Leg Raise Test, a normal Cerebellar Exam and a normal Finger-Nose-Finger Test. Gait normal. Coordination and gait normal.  
Skin: Skin is warm and dry. She is not diaphoretic. Psychiatric: Mood, memory, affect and judgment normal.  
Nursing note and vitals reviewed. ASSESSMENT AND PLAN Mary Childers is a 76 y.o. female who presents today for: 1. Medicare annual wellness visit, subsequent See other note 2. Screening for depression 
- [address] 3. Screening for alcoholism - [place] ANNUAL ALCOHOL SCREEN 15 MIN 4. Visit for screening mammogram 
Pt to schedule next year per preference. 5. HTN, goal below 140/90 Stable, continue current treatment. 6. Hyperlipidemia, unspecified hyperlipidemia type Will calculate ASCVD risk score pending labs. - METABOLIC PANEL, COMPREHENSIVE 
- LIPID PANEL 7. Elevated glucose 
- HEMOGLOBIN A1C WITH EAG 8. Severe obesity ([address] I have reviewed/discussed the above normal BMI with the patient. I have recommended the following interventions: dietary management education, guidance, and counseling, encourage exercise, monitor weight and prescribed dietary intake. 9. Tremor Recommend patient to reach out to her psychiatrist to see if tremor may be due to side effect of her antipsychotic medications. Discuss otherwise it may be a benign essential tremor. Unable to appreciate tremor on today's exam.  If worsen, could consider neurology in the future. There are no discontinued medications. Follow-up Disposition: 
Return in about 6 months (around 6/3/2019) for HTN follow up. Medication risks/benefits/costs/interactions/alternatives discussed with patient. Advised patient to call back or return to office if symptoms worsen/change/persist. If patient cannot reach us or should anything more severe/urgent arise he/she should proceed directly to the nearest emergency department. Discussed expected course/resolution/complications of diagnosis in detail with patient. Patient given a written after visit summary which includes his/her diagnoses, current medications and vitals. Patient expressed understanding with the diagnosis and plan. Yareli Aceves M.D. Diagnoses and all orders for this visit: 
 
1. Medicare annual wellness visit, subsequent 2. Screening for depression 
-     [address] 3. Screening for alcoholism -     [place] ANNUAL ALCOHOL SCREEN 15 MIN 4. Visit for screening mammogram 
 
5. HTN, goal below 140/90 6. Hyperlipidemia, unspecified hyperlipidemia type -     METABOLIC PANEL, COMPREHENSIVE 
-     LIPID PANEL 7. Elevated glucose 
-     HEMOGLOBIN A1C WITH EAG 8. Severe obesity ([address] Assessment & Plan: 
Uncontrolled, based on history, physical exam and review of pertinent labs, studies and medications; meds reconciled; lifestyle modifications recommended. Key Obesity Meds   
    
  
 hydroCHLOROthiazide (MICROZIDE) 12.5 mg capsule  (Taking) TAKE 1 CAPSULE EVERY DAY Lab Results Component Value Date/Time Hemoglobin A1c 5.3 06/20/2018 04:26 PM  
 Glucose 165 06/20/2018 04:26 PM  
 Cholesterol, total 132 03/05/2018 10:53 AM  
 HDL Cholesterol 45 03/05/2018 10:53 AM  
 LDL, calculated 70 03/05/2018 10:53 AM  
 Triglyceride 83 03/05/2018 10:53 AM  
 Sodium 140 06/20/2018 04:26 PM  
 Potassium 4.0 06/20/2018 04:26 PM  
 ALT (SGPT) 22 03/05/2018 10:53 AM  
 AST (SGOT) 16 03/05/2018 10:53 AM  
 
 
 
 
 
9. Tremor Other orders -     pneumococcal 13 am[place] conj dip (PREVNAR-13) 0.5 mL syrg injection; 0.5 mL by IntraMUSCular route once for 1 dose. Please fax vaccination documentation to [facility] at [phone], Attn: Dr. Alton Moser

## 2018-12-03 NOTE — ASSESSMENT & PLAN NOTE
Uncontrolled, based on history, physical exam and review of pertinent labs, studies and medications; meds reconciled; lifestyle modifications recommended. Key Obesity Meds   
    
  
 hydroCHLOROthiazide (MICROZIDE) 12.5 mg capsule  (Taking) TAKE 1 CAPSULE EVERY DAY Lab Results Component Value Date/Time  Hemoglobin A1c 5.3 06/20/2018 04:26 PM  
 Glucose 165 06/20/2018 04:26 PM  
 Cholesterol, total 132 03/05/2018 10:53 AM  
 HDL Cholesterol 45 03/05/2018 10:53 AM  
 LDL, calculated 70 03/05/2018 10:53 AM  
 Triglyceride 83 03/05/2018 10:53 AM  
 Sodium 140 06/20/2018 04:26 PM  
 Potassium 4.0 06/20/2018 04:26 PM  
 ALT (SGPT) 22 03/05/2018 10:53 AM  
 AST (SGOT) 16 03/05/2018 10:53 AM

## 2018-12-04 LAB
ALBUMIN SERPL-MCNC: 4.6 G/DL (ref 3.6–4.8)
ALBUMIN/GLOB SERPL: 1.7 {RATIO} (ref 1.2–2.2)
ALP SERPL-CCNC: 64 IU/L (ref 39–117)
ALT SERPL-CCNC: 20 IU/L (ref 0–32)
AST SERPL-CCNC: 19 IU/L (ref 0–40)
BILIRUB SERPL-MCNC: 0.9 MG/DL (ref 0–1.2)
BUN SERPL-MCNC: 17 MG/DL (ref 8–27)
BUN/CREAT SERPL: 16 (ref 12–28)
CALCIUM SERPL-MCNC: 9.8 MG/DL (ref 8.7–10.3)
CHLORIDE SERPL-SCNC: 100 MMOL/L (ref 96–106)
CHOLEST SERPL-MCNC: 133 MG/DL (ref 100–199)
CO2 SERPL-SCNC: 25 MMOL/L (ref 20–29)
CREAT SERPL-MCNC: 1.07 MG/DL (ref 0.57–1)
EST. AVERAGE GLUCOSE BLD GHB EST-MCNC: 108 MG/DL
GLOBULIN SER CALC-MCNC: 2.7 G/DL (ref 1.5–4.5)
GLUCOSE SERPL-MCNC: 92 MG/DL (ref 65–99)
HBA1C MFR BLD: 5.4 % (ref 4.8–5.6)
HDLC SERPL-MCNC: 46 MG/DL
INTERPRETATION, 910389: NORMAL
INTERPRETATION: NORMAL
LDLC SERPL CALC-MCNC: 65 MG/DL (ref 0–99)
PDF IMAGE, 910387: NORMAL
POTASSIUM SERPL-SCNC: 4.5 MMOL/L (ref 3.5–5.2)
PROT SERPL-MCNC: 7.3 G/DL (ref 6–8.5)
SODIUM SERPL-SCNC: 142 MMOL/L (ref 134–144)
TRIGL SERPL-MCNC: 110 MG/DL (ref 0–149)
VLDLC SERPL CALC-MCNC: 22 MG/DL (ref 5–40)

## 2018-12-04 NOTE — PROGRESS NOTES
Outbound call to patient.  verified. Patient made aware of lab results and recommendations per Dr. Hawk Venegas. Patient voiced understanding.

## 2019-01-07 RX ORDER — HYDROCHLOROTHIAZIDE 12.5 MG/1
CAPSULE ORAL
Qty: 90 CAP | Refills: 1 | Status: SHIPPED | OUTPATIENT
Start: 2019-01-07 | End: 2019-05-22 | Stop reason: SDUPTHER

## 2019-01-11 DIAGNOSIS — R13.10 DYSPHAGIA, UNSPECIFIED TYPE: ICD-10-CM

## 2019-01-11 RX ORDER — RANITIDINE 150 MG/1
TABLET, FILM COATED ORAL
Qty: 30 TAB | Refills: 1 | Status: SHIPPED | OUTPATIENT
Start: 2019-01-11 | End: 2019-03-12 | Stop reason: SDUPTHER

## 2019-01-15 ENCOUNTER — OFFICE VISIT (OUTPATIENT)
Dept: FAMILY MEDICINE CLINIC | Age: 69
End: 2019-01-15

## 2019-01-15 VITALS
HEIGHT: 63 IN | TEMPERATURE: 98.5 F | WEIGHT: 206.2 LBS | BODY MASS INDEX: 36.54 KG/M2 | OXYGEN SATURATION: 98 % | HEART RATE: 58 BPM | SYSTOLIC BLOOD PRESSURE: 112 MMHG | RESPIRATION RATE: 18 BRPM | DIASTOLIC BLOOD PRESSURE: 64 MMHG

## 2019-01-15 DIAGNOSIS — H60.503 ACUTE OTITIS EXTERNA OF BOTH EARS, UNSPECIFIED TYPE: Primary | ICD-10-CM

## 2019-01-15 RX ORDER — CIPROFLOXACIN AND DEXAMETHASONE 3; 1 MG/ML; MG/ML
4 SUSPENSION/ DROPS AURICULAR (OTIC) 2 TIMES DAILY
Qty: 7.5 ML | Refills: 0 | Status: SHIPPED | OUTPATIENT
Start: 2019-01-15 | End: 2019-01-22

## 2019-01-15 NOTE — PROGRESS NOTES
Chief Complaint   Patient presents with    Ear Pain     bilateral but worse on the right, it feels like burning x 2 weeks     1. Have you been to the ER, urgent care clinic since your last visit? Hospitalized since your last visit? No    2. Have you seen or consulted any other health care providers outside of the [address] [place] [address] since your last visit? Include any pap smears or colon screening. No    Patient stated that she had an eye exam 1/02/19, will obtain records.

## 2019-01-15 NOTE — PATIENT INSTRUCTIONS

## 2019-01-15 NOTE — PROGRESS NOTES
Patient Name: Janusz Valencia   MRN: [id number]    Neena Alvarez is a 76 y.o. female who presents with the followin-week history of a burning sensation in both ears but mostly in the right ear. Feels like there is a crackling sensation. Denies any recent URI symptoms, decreased hearing, water exposure, Q-tip use, or treatment so far. Review of Systems   Constitutional: Negative for fever, malaise/fatigue and weight loss. HENT: Positive for ear pain. Negative for congestion, ear discharge, hearing loss, sinus pain, sore throat and tinnitus. Respiratory: Negative for cough, hemoptysis, shortness of breath and wheezing. Cardiovascular: Negative for chest pain, palpitations, leg swelling and PND. Gastrointestinal: Negative for abdominal pain, constipation, diarrhea, nausea and vomiting. The patient's medications, allergies, past medical history, surgical history, family history and social history were reviewed and updated where appropriate. Prior to Admission medications    Medication Sig Start Date End Date Taking? Authorizing Provider   peg 400-propylene glycol (SYSTANE, PROPYLENE GLYCOL,) 0.4-0.3 % drop Administer 2 Drops to both eyes daily. Yes Provider, Historical   raNITIdine (ZANTAC) 150 mg tablet TAKE ONE TABLET BY MOUTH DAILY 19  Yes Mj Singh MD   hydroCHLOROthiazide (MICROZIDE) 12.5 mg capsule TAKE 1 CAPSULE EVERY DAY 19  Yes Mj Singh MD   simvastatin (ZOCOR) 40 mg tablet TAKE 1 TABLET EVERY NIGHT 10/11/18  Yes Silvana Villa Friday, NP   DOCOSAHEXANOIC ACID/EPA (FISH OIL PO) Take 1 Cap by mouth two (2) times a day. Yes Provider, Historical   CALCIUM CARBONATE/VITAMIN D3 (CALCIUM + D PO) Take 1,000 mg by mouth daily. Yes Provider, Historical   haloperidol (HALDOL) 1 mg tablet Take 1 Tab by mouth daily. 3/20/15  Yes Warden Max MD   aspirin (ASPIRIN) 325 mg tablet Take 1 Tab by mouth daily.  13  Yes Aubree Kauffmna MD FLUoxetine (PROZAC) 40 mg capsule Take 40 mg by mouth daily. Yes Provider, Historical   loratadine 10 mg Cap Take 10 mg by mouth daily. Yes Provider, Historical       Allergies   Allergen Reactions    Bee Venom Protein (Honey Bee) Hives    Voltaren [Diclofenac Sodium] Diarrhea           OBJECTIVE    Visit Vitals  /64 (BP 1 Location: Left arm, BP Patient Position: Sitting)   Pulse (!) 58   Temp 98.5 °F (36.9 °C) (Oral)   Resp 18   Ht 5' 2.5\" (1.588 m)   Wt 206 lb 3.2 oz (93.5 kg)   SpO2 98%   BMI 37.11 kg/m²       Physical Exam   Constitutional: She is oriented to person, place, and time and well-developed, well-nourished, and in no distress. No distress. HENT:   Head: Normocephalic and atraumatic. Right Ear: Hearing and tympanic membrane normal. There is tenderness (Tenderness in external auditory canal upon insertion of otoscope). Tympanic membrane is not perforated and not erythematous. No middle ear effusion. No decreased hearing is noted. Left Ear: Hearing, tympanic membrane, external ear and ear canal normal. Tympanic membrane is not perforated and not erythematous. No middle ear effusion. No decreased hearing is noted. Scant white debris along EAC   Eyes: Conjunctivae and EOM are normal. Pupils are equal, round, and reactive to light. Musculoskeletal: Normal range of motion. Neurological: She is alert and oriented to person, place, and time. Gait normal.   Skin: Skin is warm and dry. She is not diaphoretic. Psychiatric: Mood, memory, affect and judgment normal.   Nursing note and vitals reviewed. ASSESSMENT AND PLAN  Prudence Sam is a 76 y.o. female who presents today for:    1. Acute otitis externa of both ears, unspecified type  - ciprofloxacin-dexamethasone (CIPRODEX) 0.3-0.1 % otic suspension; Administer 4 Drops into each ear two (2) times a day for 7 days. Dispense: 7.5 mL; Refill: 0       There are no discontinued medications.     Follow-up Disposition:  Return if symptoms worsen or fail to improve. Medication risks/benefits/costs/interactions/alternatives discussed with patient. Advised patient to call back or return to office if symptoms worsen/change/persist. If patient cannot reach us or should anything more severe/urgent arise he/she should proceed directly to the nearest emergency department. Discussed expected course/resolution/complications of diagnosis in detail with patient. Patient given a written after visit summary which includes his/her diagnoses, current medications and vitals. Patient expressed understanding with the diagnosis and plan. Yareli Mitchell M.D.

## 2019-01-22 ENCOUNTER — TELEPHONE (OUTPATIENT)
Dept: FAMILY MEDICINE CLINIC | Age: 69
End: 2019-01-22

## 2019-01-22 RX ORDER — NEOMYCIN SULFATE, POLYMYXIN B SULFATE AND HYDROCORTISONE 10; 3.5; 1 MG/ML; MG/ML; [USP'U]/ML
4 SUSPENSION/ DROPS AURICULAR (OTIC) 4 TIMES DAILY
Qty: 10 ML | Refills: 0 | Status: SHIPPED | OUTPATIENT
Start: 2019-01-22 | End: 2019-01-29

## 2019-01-22 NOTE — TELEPHONE ENCOUNTER
Both Cipro HC (initial choice) and Ciprodex (2nd choice that was sent in due to [facility] [employer] being too costly) were too expensive? Not sure if she got Ana Cristina's prior message (See TE note). Oral antibiotics will not treat her ear condition; she needs topical ear drops. Will try Cortisporin ear drops instead.

## 2019-01-22 NOTE — TELEPHONE ENCOUNTER
Outgoing call to patient.  verified. Patient request an alternative medication for the ear drops. Patient states that it is too expensive. Patient request an oral ABT instead.

## 2019-01-22 NOTE — TELEPHONE ENCOUNTER
Patient is calling stating that she has ear infection, the medication that was prescribed ciprofloxacin-hydrocortisone (CIPRO HC) otic suspension   is too expensive, pt would like a alternative medication that is not expensive.     .Best callback:[phone]      LOV:  Tuesday, January 15, 2019

## 2019-01-23 NOTE — TELEPHONE ENCOUNTER
Outbound call to patient.  verified. Patient made aware of note below. Patient voiced understanding and will  Cortisporin at the pharmacy.

## 2019-03-21 ENCOUNTER — OFFICE VISIT (OUTPATIENT)
Dept: FAMILY MEDICINE CLINIC | Age: 69
End: 2019-03-21

## 2019-03-21 VITALS
HEART RATE: 79 BPM | SYSTOLIC BLOOD PRESSURE: 149 MMHG | TEMPERATURE: 98.4 F | OXYGEN SATURATION: 97 % | RESPIRATION RATE: 20 BRPM | DIASTOLIC BLOOD PRESSURE: 64 MMHG

## 2019-03-21 DIAGNOSIS — M54.50 LUMBAR BACK PAIN: Primary | ICD-10-CM

## 2019-03-21 RX ORDER — CYCLOBENZAPRINE HCL 5 MG
5-10 TABLET ORAL
Qty: 30 TAB | Refills: 1 | Status: SHIPPED | OUTPATIENT
Start: 2019-03-21 | End: 2019-12-03

## 2019-03-21 RX ORDER — HYDROCODONE BITARTRATE AND ACETAMINOPHEN 5; 325 MG/1; MG/1
1 TABLET ORAL
Qty: 12 TAB | Refills: 0 | Status: SHIPPED | OUTPATIENT
Start: 2019-03-21 | End: 2019-03-24

## 2019-03-21 RX ORDER — METHYLPREDNISOLONE 4 MG/1
TABLET ORAL
Qty: 1 DOSE PACK | Refills: 0 | Status: SHIPPED | OUTPATIENT
Start: 2019-03-21 | End: 2019-06-03 | Stop reason: ALTCHOICE

## 2019-03-21 NOTE — PROGRESS NOTES
Chief Complaint   Patient presents with    Back Pain     pt states her back started hurting 3 days ago and then she picked up her grandchild yesterday and is now having back spasms. worse on right side.  Leg Pain     pt states when she has the back spasms the pain radiates down both legs. \"REVIEWED RECORD IN PREPARATION FOR VISIT AND HAVE OBTAINED THE NECESSARY DOCUMENTATION\"  1. Have you been to the ER, urgent care clinic since your last visit? Hospitalized since your last visit? No    2. Have you seen or consulted any other health care providers outside of the [address] [place] [address] since your last visit? Include any pap smears or colon screening.  No

## 2019-03-21 NOTE — PROGRESS NOTES
[facility] Note    Teddy Greene is a 71 y.o. female who was seen in clinic today (3/21/2019). Subjective:  Back Pain  Patient presents for presents evaluation of low back problems. Symptoms have been present for 5 days and include pain in lumbar back pain (aching, severe, shooting, tight band in character; 10/10 in severity), paresthesias in bilateral legs. Initial inciting event: lifting 30 lb grandchild. Alleviating factors identifiable by patient are sitting. Exacerbating factors identifiable by patient are standing, walking, recumbency, increased intrathoracic pressure (cough, sneeze, etc.), bending forwards, bending sideways. Treatments so far initiated by patient: Ibuprofen. Previous lower back problems: self-limiting back pain. Previous workup: none. Prior to Admission medications    Medication Sig Start Date End Date Taking? Authorizing Provider   cyclobenzaprine (FLEXERIL) 5 mg tablet Take 1-2 Tabs by mouth three (3) times daily as needed for Muscle Spasm(s). 3/21/19  Yes Halina Michelle NP   methylPREDNISolone (MEDROL DOSEPACK) 4 mg tablet Take as directed 3/21/19  Yes Halina Michelle NP   HYDROcodone-acetaminophen (NORCO) 5-325 mg per tablet Take 1 Tab by mouth every six (6) hours as needed for Pain for up to 3 days. Max Daily Amount: 4 Tabs. 3/21/19 3/24/19 Yes Halina Michelle NP   raNITIdine (ZANTAC) 150 mg tablet TAKE ONE TABLET BY MOUTH DAILY 3/12/19  Yes Masterson, Reginia Sacks, MD   peg 400-propylene glycol (SYSTANE, PROPYLENE GLYCOL,) 0.4-0.3 % drop Administer 2 Drops to both eyes daily. Yes Provider, Historical   hydroCHLOROthiazide (MICROZIDE) 12.5 mg capsule TAKE 1 CAPSULE EVERY DAY 1/7/19  Yes Michael Rodriguez MD   simvastatin (ZOCOR) 40 mg tablet TAKE 1 TABLET EVERY NIGHT 10/11/18  Yes Gaby Villa NP   DOCOSAHEXANOIC ACID/EPA (FISH OIL PO) Take 1 Cap by mouth two (2) times a day.    Yes Provider, Historical   CALCIUM CARBONATE/VITAMIN D3 (CALCIUM + D PO) Take 1,000 mg by mouth daily. Yes Provider, Historical   haloperidol (HALDOL) 1 mg tablet Take 1 Tab by mouth daily. 3/20/15  Yes Ashlee Jenkins MD   aspirin (ASPIRIN) 325 mg tablet Take 1 Tab by mouth daily. 6/13/13  Yes Danny Quezada MD   FLUoxetine (PROZAC) 40 mg capsule Take 40 mg by mouth daily. Yes Provider, Historical   loratadine 10 mg Cap Take 10 mg by mouth daily. Yes Provider, Historical          Allergies   Allergen Reactions    Bee Venom Protein (Honey Bee) Hives    Voltaren [Diclofenac Sodium] Diarrhea         ROS  See HPI    Objective:   Physical Exam   Constitutional: She is oriented to person, place, and time. She appears well-nourished. Neck: Normal range of motion. Neck supple. Cardiovascular: Normal rate and regular rhythm. No murmur heard. Pulmonary/Chest: Breath sounds normal.   Musculoskeletal:        Lumbar back: She exhibits decreased range of motion (with flexion), tenderness (paravertebral muscles) and bony tenderness (spinal processes). She exhibits no spasm. Positive SLR bilaterally. Leg strength equal bilaterally 5/5 with extension and flexion. Neurological: She is oriented to person, place, and time. Gait normal.         Visit Vitals  /64 (BP 1 Location: Left arm, BP Patient Position: Sitting)   Pulse 79   Temp 98.4 °F (36.9 °C) (Oral)   Resp 20   SpO2 97%     XR Results (most recent):  Results from Appointment encounter on 03/21/19   XR SPINE LUMB 2 OR 3 V    Narrative EXAM: XR SPINE LUMB 2 OR 3 V    INDICATION: Severe low back pain. TECHNIQUE: AP, lateral, and coned-down lateral views of the lumbar spine. COMPARISON: Lumbar spine views on 2/19/2008. FINDINGS: There is no acute fracture or compression deformity. Alignment is  within normal limits. Severe degenerative disc disease at L2-3 is increased and  may have some degree of ankylosis. Moderate degenerative disc disease at L1-2 is  increased.  Mild degenerative disc disease L3-S1 is increased. Multilevel facet  arthrosis is increased. Thoracic spine degenerative disc disease is increased  but partially imaged. Impression IMPRESSION:   1. No fracture. 2. Increased degenerative disease, including severe degenerative disc disease at  L2-3. Assessment & Plan:  Diagnoses and all orders for this visit:    1. Lumbar back pain  X-ray today showed severe DDD at L2-3. Consider disc herniation. Medrol dose pack. Hydrocodone for severe pain only -  reviewed. Request physical therapy evaluation and treatment. Referral to orthopedics for continued symptoms. -     XR SPINE LUMB 2 OR 3 V; Future  -     cyclobenzaprine (FLEXERIL) 5 mg tablet; Take 1-2 Tabs by mouth three (3) times daily as needed for Muscle Spasm(s). -     methylPREDNISolone (MEDROL DOSEPACK) 4 mg tablet; Take as directed  -     HYDROcodone-acetaminophen (NORCO) 5-325 mg per tablet; Take 1 Tab by mouth every six (6) hours as needed for Pain for up to 3 days. Max Daily Amount: 4 Tabs. -     REFERRAL TO PHYSICAL THERAPY      I have discussed the diagnosis with the patient and the intended plan as seen in the above orders. The patient has received an after-visit summary along with patient information handout. I have discussed medication side effects and warnings with the patient as well. Follow-up and Dispositions    · Return if symptoms worsen or fail to improve.            Loan Miller NP

## 2019-03-21 NOTE — PATIENT INSTRUCTIONS
Herniated Disc: Exercises  Your Care Instructions  Here are some examples of typical rehabilitation exercises for your condition. Start each exercise slowly. Ease off the exercise if you start to have pain. Your doctor or physical therapist will tell you when you can start these exercises and which ones will work best for you. How to stay safe  These exercises can help you move easier and feel better. But when you first start doing them, you may have more pain in your back. This is normal. But it is important to pay close attention to your pain during and after each exercise. · Keep doing these exercises if your pain stays the same or moves from your leg and buttock more toward the middle of your spine. Pain moving out of your leg and buttock is a good sign. · Stop doing these exercises if your pain gets worse in your leg and buttock. Stop if you start to have pain in your leg and buttock that you didn't have before. Be sure to do these exercises in the order they appear. Note how your pain changes before you move to the next one. If your pain is much worse right after exercise and stays worse the next day, do not do any of these exercises. How to do the exercises  1. Rest on belly    1. Lie on your stomach, face down, with your head turned to the side. Place your arms beside your body. If this bothers your neck, place your hands, one on top of the other, underneath your forehead. This will help support your head and neck. 2. Try to relax your lower back muscles as much as you can. 3. Continue to lie on your stomach for 2 minutes. 4. If your pain spreads down your leg or increases down your leg, stop this exercise and do not do the next exercises. 2. Press-up    1. Lie on your stomach, face down. Keep your elbows tucked into your sides and under your shoulders. 2. Press your elbows down into the floor to raise your upper back. As you do this, relax your stomach muscles.  Allow your back to arch without using your back muscles. Let your low back relax completely as you arch up. 3. Hold this position for 2 minutes. 4. Repeat 2 to 4 times. 5. If your pain spreads down your leg or increases down your leg, stop this exercise and do not do the next exercises. 3. Full press-up    1. Lie on your stomach, face down. Keep your elbows tucked into your sides and under your shoulders. 2. Straighten your elbows, and push your upper body up as far as you can. Allow your lower back to sag. Keep your hips, pelvis, and legs relaxed. 3. Hold this position for 5 seconds, and then relax. 4. Repeat 10 times. Each time, try to raise your upper body a little higher and hold your arms a bit straighter. 5. If your pain spreads down your leg or gets worse down your leg, stop this exercise and do not move to the next exercise. 6. If you can't do this exercise, you may instead try the backward bend exercise that follows. 4. Backward bend    1. Stand with your feet hip-width apart. Your toes should point forward. Do not lock your knees. 2. Place your hands in the small of your back. 3. Bend backward as far as you can, keeping your knees straight. Hold this position for 2 to 3 seconds. Then return to your starting position. 4. Repeat 2 to 4 times. Each time, try to bend backward a little farther, until you bend backward as far as you can. 5. If your pain spreads down your leg or increases down your leg, stop this exercise. Follow-up care is a key part of your treatment and safety. Be sure to make and go to all appointments, and call your doctor if you are having problems. It's also a good idea to know your test results and keep a list of the medicines you take. Where can you learn more? Go to http://becka-derrick.info/. Enter 75759 [id number] in the search box to learn more about \"Herniated Disc: Exercises. \"  Current as of: September 20, 2018  Content Version: 11.9  © 1837-4941 [employer]. Care instructions adapted under license by [employer] (which disclaims liability or warranty for this information). If you have questions about a medical condition or this instruction, always ask your healthcare professional. [address] any warranty or liability for your use of this information.

## 2019-05-23 ENCOUNTER — HOSPITAL ENCOUNTER (OUTPATIENT)
Dept: MAMMOGRAPHY | Age: 69
Discharge: HOME | End: 2019-05-23
Attending: FAMILY MEDICINE
Payer: MEDICARE

## 2019-05-23 DIAGNOSIS — Z12.31 VISIT FOR SCREENING MAMMOGRAM: ICD-10-CM

## 2019-05-23 PROCEDURE — 77067 SCR MAMMO BI INCL CAD: CPT

## 2019-05-23 RX ORDER — HYDROCHLOROTHIAZIDE 12.5 MG/1
CAPSULE ORAL
Qty: 90 CAP | Refills: 1 | Status: SHIPPED | OUTPATIENT
Start: 2019-05-23 | End: 2019-10-23 | Stop reason: SDUPTHER

## 2019-06-03 ENCOUNTER — OFFICE VISIT (OUTPATIENT)
Dept: FAMILY MEDICINE CLINIC | Age: 69
End: 2019-06-03

## 2019-06-03 VITALS
RESPIRATION RATE: 16 BRPM | WEIGHT: 212 LBS | DIASTOLIC BLOOD PRESSURE: 72 MMHG | BODY MASS INDEX: 37.56 KG/M2 | OXYGEN SATURATION: 98 % | SYSTOLIC BLOOD PRESSURE: 120 MMHG | HEART RATE: 69 BPM | HEIGHT: 63 IN | TEMPERATURE: 97.9 F

## 2019-06-03 DIAGNOSIS — R73.03 PREDIABETES: ICD-10-CM

## 2019-06-03 DIAGNOSIS — S03.00XD DISLOCATION OF TEMPOROMANDIBULAR JOINT, SUBSEQUENT ENCOUNTER: ICD-10-CM

## 2019-06-03 DIAGNOSIS — I10 HTN, GOAL BELOW 140/90: Primary | ICD-10-CM

## 2019-06-03 DIAGNOSIS — E78.5 HYPERLIPIDEMIA, UNSPECIFIED HYPERLIPIDEMIA TYPE: ICD-10-CM

## 2019-06-03 DIAGNOSIS — F20.89 OTHER SCHIZOPHRENIA (HCC): ICD-10-CM

## 2019-06-03 DIAGNOSIS — E66.01 SEVERE OBESITY (HCC): ICD-10-CM

## 2019-06-03 NOTE — PATIENT INSTRUCTIONS
DASH Diet: Care Instructions  Your Care Instructions    The DASH diet is an eating plan that can help lower your blood pressure. DASH stands for Dietary Approaches to Stop Hypertension. Hypertension is high blood pressure. The DASH diet focuses on eating foods that are high in calcium, potassium, and magnesium. These nutrients can lower blood pressure. The foods that are highest in these nutrients are fruits, vegetables, low-fat dairy products, nuts, seeds, and legumes. But taking calcium, potassium, and magnesium supplements instead of eating foods that are high in those nutrients does not have the same effect. The DASH diet also includes whole grains, fish, and poultry. The DASH diet is one of several lifestyle changes your doctor may recommend to lower your high blood pressure. Your doctor may also want you to decrease the amount of sodium in your diet. Lowering sodium while following the DASH diet can lower blood pressure even further than just the DASH diet alone. Follow-up care is a key part of your treatment and safety. Be sure to make and go to all appointments, and call your doctor if you are having problems. It's also a good idea to know your test results and keep a list of the medicines you take. How can you care for yourself at home? Following the DASH diet  · Eat 4 to 5 servings of fruit each day. A serving is 1 medium-sized piece of fruit, ½ cup chopped or canned fruit, 1/4 cup dried fruit, or 4 ounces (½ cup) of fruit juice. Choose fruit more often than fruit juice. · Eat 4 to 5 servings of vegetables each day. A serving is 1 cup of lettuce or raw leafy vegetables, ½ cup of chopped or cooked vegetables, or 4 ounces (½ cup) of vegetable juice. Choose vegetables more often than vegetable juice. · Get 2 to 3 servings of low-fat and fat-free dairy each day. A serving is 8 ounces of milk, 1 cup of yogurt, or 1 ½ ounces of cheese. · Eat 6 to 8 servings of grains each day.  A serving is 1 slice of bread, 1 ounce of dry cereal, or ½ cup of cooked rice, pasta, or cooked cereal. Try to choose whole-grain products as much as possible. · Limit lean meat, poultry, and fish to 2 servings each day. A serving is 3 ounces, about the size of a deck of cards. · Eat 4 to 5 servings of nuts, seeds, and legumes (cooked dried beans, lentils, and split peas) each week. A serving is 1/3 cup of nuts, 2 tablespoons of seeds, or ½ cup of cooked beans or peas. · Limit fats and oils to 2 to 3 servings each day. A serving is 1 teaspoon of vegetable oil or 2 tablespoons of salad dressing. · Limit sweets and added sugars to 5 servings or less a week. A serving is 1 tablespoon jelly or jam, ½ cup sorbet, or 1 cup of lemonade. · Eat less than 2,300 milligrams (mg) of sodium a day. If you limit your sodium to 1,500 mg a day, you can lower your blood pressure even more. Tips for success  · Start small. Do not try to make dramatic changes to your diet all at once. You might feel that you are missing out on your favorite foods and then be more likely to not follow the plan. Make small changes, and stick with them. Once those changes become habit, add a few more changes. · Try some of the following:  ? Make it a goal to eat a fruit or vegetable at every meal and at snacks. This will make it easy to get the recommended amount of fruits and vegetables each day. ? Try yogurt topped with fruit and nuts for a snack or healthy dessert. ? Add lettuce, tomato, cucumber, and onion to sandwiches. ? Combine a ready-made pizza crust with low-fat mozzarella cheese and lots of vegetable toppings. Try using tomatoes, squash, spinach, broccoli, carrots, cauliflower, and onions. ? Have a variety of cut-up vegetables with a low-fat dip as an appetizer instead of chips and dip. ? Sprinkle sunflower seeds or chopped almonds over salads. Or try adding chopped walnuts or almonds to cooked vegetables.   ? Try some vegetarian meals using beans and peas. Add garbanzo or kidney beans to salads. Make burritos and tacos with mashed molina beans or black beans. Where can you learn more? Go to http://becka-derrick.info/. Enter L029 in the search box to learn more about \"DASH Diet: Care Instructions. \"  Current as of: July 22, 2018  Content Version: 11.9  © 1455-8698 [employer], [employer]. Care instructions adapted under license by [employer] (which disclaims liability or warranty for this information). If you have questions about a medical condition or this instruction, always ask your healthcare professional. [address] any warranty or liability for your use of this information.

## 2019-06-03 NOTE — ASSESSMENT & PLAN NOTE
This condition is managed by Specialist.  Key Psychotherapeutic Meds             FLUoxetine (PROZAC) 40 mg capsule (Taking) Take 40 mg by mouth daily. haloperidol (HALDOL) 1 mg tablet Take 1 Tab by mouth daily. Other [address] [place] [address]     The patient is on no other behavioral health meds.         Lab Results   Component Value Date/Time    Sodium 142 12/03/2018 03:44 PM    Creatinine 1.07 12/03/2018 03:44 PM    ALT (SGPT) 20 12/03/2018 03:44 PM    AST (SGOT) 19 12/03/2018 03:44 PM

## 2019-06-03 NOTE — ASSESSMENT & PLAN NOTE
Uncontrolled, based on history, physical exam and review of pertinent labs, studies and medications; meds reconciled; continue current treatment plan.   Key Obesity Meds             hydroCHLOROthiazide (MICROZIDE) 12.5 mg capsule (Taking) TAKE 1 CAPSULE EVERY DAY        Lab Results   Component Value Date/Time    Hemoglobin A1c 5.4 12/03/2018 03:44 PM    Glucose 92 12/03/2018 03:44 PM    Cholesterol, total 133 12/03/2018 03:44 PM    HDL Cholesterol 46 12/03/2018 03:44 PM    LDL, calculated 65 12/03/2018 03:44 PM    Triglyceride 110 12/03/2018 03:44 PM    Sodium 142 12/03/2018 03:44 PM    Potassium 4.5 12/03/2018 03:44 PM    ALT (SGPT) 20 12/03/2018 03:44 PM    AST (SGOT) 19 12/03/2018 03:44 PM

## 2019-06-03 NOTE — PROGRESS NOTES
Patient Name: Priya Vital   MRN: [id number]    Gurjit Richards is a 71 y.o. female who presents with the following: The patient has hypertension,pre-dm, and hyperlipidemia. She reports taking medications as instructed, no medication side effects noted. Diet and Lifestyle: generally follows a low fat low cholesterol diet, generally follows a low sodium diet, no formal exercise but active during the day. Lab review: orders written for new lab studies as appropriate; see orders. Bilateral ear/jaw pain due to TMJ; saw an ENT doctor for this. Has not seen a dentist recently. BP Readings from Last 3 Encounters:   06/03/19 120/72   03/21/19 149/64   01/15/19 112/64     Review of Systems   Constitutional: Negative for fever, malaise/fatigue and weight loss. HENT: Positive for ear pain. Respiratory: Negative for cough, hemoptysis, shortness of breath and wheezing. Cardiovascular: Negative for chest pain, palpitations, leg swelling and PND. Gastrointestinal: Negative for abdominal pain, constipation, diarrhea, nausea and vomiting. The patient's medications, allergies, past medical history, surgical history, family history and social history were reviewed and updated where appropriate. Prior to Admission medications    Medication Sig Start Date End Date Taking? Authorizing Provider   hydroCHLOROthiazide (MICROZIDE) 12.5 mg capsule TAKE 1 CAPSULE EVERY DAY 5/23/19  Yes Claudia Dee MD   cyclobenzaprine (FLEXERIL) 5 mg tablet Take 1-2 Tabs by mouth three (3) times daily as needed for Muscle Spasm(s). 3/21/19  Yes Gold Michelle NP   raNITIdine (ZANTAC) 150 mg tablet TAKE ONE TABLET BY MOUTH DAILY 3/12/19  Yes Elizabeth Dinero MD   peg 400-propylene glycol (SYSTANE, PROPYLENE GLYCOL,) 0.4-0.3 % drop Administer 2 Drops to both eyes daily.    Yes Provider, Historical   simvastatin (ZOCOR) 40 mg tablet TAKE 1 TABLET EVERY NIGHT 10/11/18  Yes Josephine Villa NP DOCOSAHEXANOIC ACID/EPA (FISH OIL PO) Take 1 Cap by mouth two (2) times a day. Yes Provider, Historical   CALCIUM CARBONATE/VITAMIN D3 (CALCIUM + D PO) Take 1,000 mg by mouth daily. Yes Provider, Historical   aspirin (ASPIRIN) 325 mg tablet Take 1 Tab by mouth daily. 6/13/13  Yes Vale Hermosillo MD   FLUoxetine (PROZAC) 40 mg capsule Take 40 mg by mouth daily. Yes Provider, Historical   loratadine 10 mg Cap Take 10 mg by mouth daily. Yes Provider, Historical   haloperidol (HALDOL) 1 mg tablet Take 1 Tab by mouth daily. 3/20/15   Hari Graves MD       Allergies   Allergen Reactions    Bee Venom Protein (Honey Bee) Hives    Voltaren [Diclofenac Sodium] Diarrhea           OBJECTIVE    Visit Vitals  /72 (BP 1 Location: Left arm, BP Patient Position: Sitting)   Pulse 69   Temp 97.9 °F (36.6 °C) (Oral)   Resp 16   Ht 5' 2.5\" (1.588 m)   Wt 212 lb (96.2 kg)   SpO2 98%   BMI 38.16 kg/m²       Physical Exam   Constitutional: She is oriented to person, place, and time and well-developed, well-nourished, and in no distress. No distress. HENT:   Right Ear: Hearing, tympanic membrane, external ear and ear canal normal.   Left Ear: Hearing, tympanic membrane and external ear normal.   Pain along b/l TMJ joints with palpation and jaw opening/closing   Eyes: Pupils are equal, round, and reactive to light. Conjunctivae and EOM are normal.   Cardiovascular: Normal rate, regular rhythm and normal heart sounds. Exam reveals no gallop and no friction rub. No murmur heard. Pulmonary/Chest: Effort normal and breath sounds normal. No respiratory distress. She has no wheezes. Neurological: She is alert and oriented to person, place, and time. Skin: Skin is warm and dry. No rash noted. She is not diaphoretic. Psychiatric: Mood, memory, affect and judgment normal.   Nursing note and vitals reviewed.         ASSESSMENT AND PLAN  Pau Gallagher is a 71 y.o. female who presents today for:    1. HTN, goal below 140/90  Stable, continue current treatment. 2. Hyperlipidemia, unspecified hyperlipidemia type  Will calculate ASCVD risk score pending labs. - METABOLIC PANEL, COMPREHENSIVE  - LIPID PANEL    3. Prediabetes  - HEMOGLOBIN A1C WITH EAG    4. Dislocation of temporomandibular joint, subsequent encounter  Pt to see dentist for possible mouth guard. 5. Other schizophrenia ([facility] [address]  Stable, continue current treatment. 6. Severe obesity ([facility] [address]  I have reviewed/discussed the above normal BMI with the patient. I have recommended the following interventions: dietary management education, guidance, and counseling, encourage exercise, monitor weight and prescribed dietary intake. Medications Discontinued During This Encounter   Medication Reason    methylPREDNISolone (MEDROL DOSEPACK) 4 mg tablet Therapy Completed       Follow-up and Dispositions    · Return in about 6 months (around 12/3/2019) for Medicare Wellness Visit (30 min). Medication risks/benefits/costs/interactions/alternatives discussed with patient. Advised patient to call back or return to office if symptoms worsen/change/persist. If patient cannot reach us or should anything more severe/urgent arise he/she should proceed directly to the nearest emergency department. Discussed expected course/resolution/complications of diagnosis in detail with patient. Patient given a written after visit summary which includes his/her diagnoses, current medications and vitals. Patient expressed understanding with the diagnosis and plan. Yareli Genao M.D.

## 2019-06-03 NOTE — PROGRESS NOTES
Chief Complaint   Patient presents with    Hypertension     follow up    Cholesterol Problem     follow up     1. Have you been to the ER, urgent care clinic since your last visit? Hospitalized since your last visit? No    2. Have you seen or consulted any other health care providers outside of the [address] [place] [address] since your last visit? Include any pap smears or colon screening.  No

## 2019-06-04 LAB
ALBUMIN SERPL-MCNC: 4.6 G/DL (ref 3.6–4.8)
ALBUMIN/GLOB SERPL: 1.8 {RATIO} (ref 1.2–2.2)
ALP SERPL-CCNC: 62 IU/L (ref 39–117)
ALT SERPL-CCNC: 20 IU/L (ref 0–32)
AST SERPL-CCNC: 19 IU/L (ref 0–40)
BILIRUB SERPL-MCNC: 0.9 MG/DL (ref 0–1.2)
BUN SERPL-MCNC: 16 MG/DL (ref 8–27)
BUN/CREAT SERPL: 13 (ref 12–28)
CALCIUM SERPL-MCNC: 10.1 MG/DL (ref 8.7–10.3)
CHLORIDE SERPL-SCNC: 98 MMOL/L (ref 96–106)
CHOLEST SERPL-MCNC: 135 MG/DL (ref 100–199)
CO2 SERPL-SCNC: 28 MMOL/L (ref 20–29)
CREAT SERPL-MCNC: 1.2 MG/DL (ref 0.57–1)
EST. AVERAGE GLUCOSE BLD GHB EST-MCNC: 114 MG/DL
GLOBULIN SER CALC-MCNC: 2.6 G/DL (ref 1.5–4.5)
GLUCOSE SERPL-MCNC: 115 MG/DL (ref 65–99)
HBA1C MFR BLD: 5.6 % (ref 4.8–5.6)
HDLC SERPL-MCNC: 42 MG/DL
INTERPRETATION, 910389: NORMAL
INTERPRETATION: NORMAL
LDLC SERPL CALC-MCNC: 71 MG/DL (ref 0–99)
PDF IMAGE, 910387: NORMAL
POTASSIUM SERPL-SCNC: 4.5 MMOL/L (ref 3.5–5.2)
PROT SERPL-MCNC: 7.2 G/DL (ref 6–8.5)
SODIUM SERPL-SCNC: 141 MMOL/L (ref 134–144)
TRIGL SERPL-MCNC: 111 MG/DL (ref 0–149)
VLDLC SERPL CALC-MCNC: 22 MG/DL (ref 5–40)

## 2019-06-13 DIAGNOSIS — R13.10 DYSPHAGIA, UNSPECIFIED TYPE: ICD-10-CM

## 2019-06-13 RX ORDER — RANITIDINE 150 MG/1
TABLET, FILM COATED ORAL
Qty: 90 TAB | Refills: 0 | Status: SHIPPED | OUTPATIENT
Start: 2019-06-13 | End: 2019-09-23 | Stop reason: SDUPTHER

## 2019-07-30 ENCOUNTER — OFFICE VISIT (OUTPATIENT)
Dept: FAMILY MEDICINE CLINIC | Age: 69
End: 2019-07-30

## 2019-07-30 VITALS
BODY MASS INDEX: 37.92 KG/M2 | HEART RATE: 77 BPM | DIASTOLIC BLOOD PRESSURE: 70 MMHG | HEIGHT: 63 IN | RESPIRATION RATE: 18 BRPM | SYSTOLIC BLOOD PRESSURE: 122 MMHG | TEMPERATURE: 98.2 F | WEIGHT: 214 LBS

## 2019-07-30 DIAGNOSIS — M79.89 SOFT TISSUE MASS: Primary | ICD-10-CM

## 2019-07-30 NOTE — PROGRESS NOTES
Chief Complaint   Patient presents with    Cyst     right arm pit just noticed it today per patient it does hurt 5/10 pain scale     Spoke to patient Identified pt with two pt identifiers (Name @ )    1. Have you been to the ER, urgent care clinic since your last visit? Hospitalized since your last visit? No    2. Have you seen or consulted any other health care providers outside of the [address] [place] [address] since your last visit? Include any pap smears or colon screening.  No     \"REVIEWED RECORD IN PREPARATION FOR VISIT AND HAVE OBTAINED NECESSARY DOCUMENTATION\"

## 2019-07-30 NOTE — PROGRESS NOTES
Patient Name: Allison Howell   MRN: [id number]    Damien Almanza is a 71 y.o. female who presents with the following:     Patient was showering this morning and noticed a tender bump in her right armpit. Hurts to touch but denies fever or drainage. Had a normal mammogram in May. No recent injury to the area. Review of Systems   Constitutional: Negative for fever, malaise/fatigue and weight loss. Respiratory: Negative for cough, hemoptysis, shortness of breath and wheezing. Cardiovascular: Negative for chest pain, palpitations, leg swelling and PND. Gastrointestinal: Negative for abdominal pain, constipation, diarrhea, nausea and vomiting. The patient's medications, allergies, past medical history, surgical history, family history and social history were reviewed and updated where appropriate. Prior to Admission medications    Medication Sig Start Date End Date Taking? Authorizing Provider   raNITIdine (ZANTAC) 150 mg tablet TAKE ONE TABLET BY MOUTH DAILY 6/13/19  Yes Carlos Murray MD   hydroCHLOROthiazide (MICROZIDE) 12.5 mg capsule TAKE 1 CAPSULE EVERY DAY 5/23/19  Yes Carlos Murray MD   cyclobenzaprine (FLEXERIL) 5 mg tablet Take 1-2 Tabs by mouth three (3) times daily as needed for Muscle Spasm(s). 3/21/19  Yes Yifan Michelle NP   peg 400-propylene glycol (SYSTANE, PROPYLENE GLYCOL,) 0.4-0.3 % drop Administer 2 Drops to both eyes daily. Yes Provider, Historical   simvastatin (ZOCOR) 40 mg tablet TAKE 1 TABLET EVERY NIGHT 10/11/18  Yes Cecil Villa NP   DOCOSAHEXANOIC ACID/EPA (FISH OIL PO) Take 1 Cap by mouth two (2) times a day. Yes Provider, Historical   CALCIUM CARBONATE/VITAMIN D3 (CALCIUM + D PO) Take 1,000 mg by mouth daily. Yes Provider, Historical   aspirin (ASPIRIN) 325 mg tablet Take 1 Tab by mouth daily. 6/13/13  Yes John Ham MD   FLUoxetine (PROZAC) 40 mg capsule Take 40 mg by mouth daily.      Yes Provider, Historical   loratadine 10 mg Cap Take 10 mg by mouth daily. Yes Provider, Historical   haloperidol (HALDOL) 1 mg tablet Take 1 Tab by mouth daily. 3/20/15   Belkis Rae MD       Allergies   Allergen Reactions    Bee Venom Protein (Honey Bee) Hives    Voltaren [Diclofenac Sodium] Diarrhea           OBJECTIVE    Visit Vitals  /70 (BP 1 Location: Right arm, BP Patient Position: Sitting)   Pulse 77   Temp 98.2 °F (36.8 °C) (Oral)   Resp 18   Ht 5' 2.5\" (1.588 m)   Wt 214 lb (97.1 kg)   BMI 38.52 kg/m²       Physical Exam   Constitutional: She is oriented to person, place, and time and well-developed, well-nourished, and in no distress. No distress. Eyes: Pupils are equal, round, and reactive to light. Conjunctivae and EOM are normal.   Musculoskeletal: Normal range of motion. Neurological: She is alert and oriented to person, place, and time. Gait normal.   Skin: Skin is warm and dry. She is not diaphoretic.   2 x 2 cm deeply palpable soft tissue mass in R axilla without overlying skin changes. Psychiatric: Mood, memory, affect and judgment normal.   Nursing note and vitals reviewed. ASSESSMENT AND PLAN  Char Lynch is a 71 y.o. female who presents today for:    1. Soft tissue mass  Possibly epidermal cyst vs lipoma. Recommend warm compresses and complete US if persistent. Reviewed signs and symptoms that would indicate a worsening medical condition which would require immediate evaluation and treatment; patient expressed understanding of plan. - US EXT NONVAS RT LTD; Future       There are no discontinued medications. Medication risks/benefits/costs/interactions/alternatives discussed with patient. Advised patient to call back or return to office if symptoms worsen/change/persist. If patient cannot reach us or should anything more severe/urgent arise he/she should proceed directly to the nearest emergency department.   Discussed expected course/resolution/complications of diagnosis in detail with patient. Patient given a written after visit summary which includes his/her diagnoses, current medications and vitals. Patient expressed understanding with the diagnosis and plan. Yareli Perez M.D.

## 2019-07-30 NOTE — PATIENT INSTRUCTIONS
Eating Healthy Foods: Care Instructions  Your Care Instructions    Eating healthy foods can help lower your risk for disease. Healthy food gives you energy and keeps your heart strong, your brain active, your muscles working, and your bones strong. A healthy diet includes a variety of foods from the basic food groups: grains, vegetables, fruits, milk and milk products, and meat and beans. Some people may eat more of their favorite foods from only one food group and, as a result, miss getting the nutrients they need. So, it is important to pay attention not only to what you eat but also to what you are missing from your diet. You can eat a healthy, balanced diet by making a few small changes. Follow-up care is a key part of your treatment and safety. Be sure to make and go to all appointments, and call your doctor if you are having problems. It's also a good idea to know your test results and keep a list of the medicines you take. How can you care for yourself at home? Look at what you eat  · Keep a food diary for a week or two and record everything you eat or drink. Track the number of servings you eat from each food group. · For a balanced diet every day, eat a variety of:  ? 6 or more ounce-equivalents of grains, such as cereals, breads, crackers, rice, or pasta, every day. An ounce-equivalent is 1 slice of bread, 1 cup of ready-to-eat cereal, or ½ cup of cooked rice, cooked pasta, or cooked cereal.  ? 2½ cups of vegetables, especially:  § Dark-green vegetables such as broccoli and spinach. § Orange vegetables such as carrots and sweet potatoes. § Dry beans (such as molina and kidney beans) and peas (such as lentils). ? 2 cups of fresh, frozen, or canned fruit. A small apple or 1 banana or orange equals 1 cup. ? 3 cups of nonfat or low-fat milk, yogurt, or other milk products. ? 5½ ounces of meat and beans, such as chicken, fish, lean meat, beans, nuts, and seeds.  One egg, 1 tablespoon of peanut butter, ½ ounce nuts or seeds, or ¼ cup of cooked beans equals 1 ounce of meat. · Learn how to read food labels for serving sizes and ingredients. Fast-food and convenience-food meals often contain few or no fruits or vegetables. Make sure you eat some fruits and vegetables to make the meal more nutritious. · Look at your food diary. For each food group, add up what you have eaten and then divide the total by the number of days. This will give you an idea of how much you are eating from each food group. See if you can find some ways to change your diet to make it more healthy. Start small  · Do not try to make dramatic changes to your diet all at once. You might feel that you are missing out on your favorite foods and then be more likely to fail. · Start slowly, and gradually change your habits. Try some of the following:  ? Use whole wheat bread instead of white bread. ? Use nonfat or low-fat milk instead of whole milk. ? Eat brown rice instead of white rice, and eat whole wheat pasta instead of white-flour pasta. ? Try low-fat cheeses and low-fat yogurt. ? Add more fruits and vegetables to meals and have them for snacks. ? Add lettuce, tomato, cucumber, and onion to sandwiches. ? Add fruit to yogurt and cereal.  Enjoy food  · You can still eat your favorite foods. You just may need to eat less of them. If your favorite foods are high in fat, salt, and sugar, limit how often you eat them, but do not cut them out entirely. · Eat a wide variety of foods. Make healthy choices when eating out  · The type of restaurant you choose can help you make healthy choices. Even fast-food chains are now offering more low-fat or healthier choices on the menu. · Choose smaller portions, or take half of your meal home. · When eating out, try:  ? A veggie pizza with a whole wheat crust or grilled chicken (instead of sausage or pepperoni).   ? Pasta with roasted vegetables, grilled chicken, or marinara sauce instead of cream sauce. ? A vegetable wrap or grilled chicken wrap. ? Broiled or poached food instead of fried or breaded items. Make healthy choices easy  · Buy packaged, prewashed, ready-to-eat fresh vegetables and fruits, such as baby carrots, salad mixes, and chopped or shredded broccoli and cauliflower. · Buy packaged, presliced fruits, such as melon or pineapple. · Choose 100% fruit or vegetable juice instead of soda. Limit juice intake to 4 to 6 oz (½ to ¾ cup) a day. · Blend low-fat yogurt, fruit juice, and canned or frozen fruit to make a smoothie for breakfast or a snack. Where can you learn more? Go to http://becka-derrick.info/. Enter T756 in the search box to learn more about \"Eating Healthy Foods: Care Instructions. \"  Current as of: November 7, 2018  Content Version: 12.1  © 4707-2101 Healthwise, Incorporated. Care instructions adapted under license by [employer] (which disclaims liability or warranty for this information). If you have questions about a medical condition or this instruction, always ask your healthcare professional. [place] [address] any warranty or liability for your use of this information.

## 2019-07-31 ENCOUNTER — TELEPHONE (OUTPATIENT)
Dept: FAMILY MEDICINE CLINIC | Age: 69
End: 2019-07-31

## 2019-07-31 NOTE — TELEPHONE ENCOUNTER
Patient is calling requesting a new order to be send to [facility] for an ultrasound under her armpit. Pt. Stated when she called them they told her to contact us first to send them an ultrasound order.      Pt.'s best call back # [phone]

## 2019-08-01 NOTE — TELEPHONE ENCOUNTER
Incoming call from patient. Patient notified that ultrasound order is placed in the system. Patient verbalized understanding.

## 2019-08-08 ENCOUNTER — HOSPITAL ENCOUNTER (OUTPATIENT)
Dept: ULTRASOUND IMAGING | Age: 69
Discharge: HOME | End: 2019-08-08
Attending: FAMILY MEDICINE
Payer: MEDICARE

## 2019-08-08 DIAGNOSIS — M79.89 SOFT TISSUE MASS: ICD-10-CM

## 2019-08-08 DIAGNOSIS — D17.9 LIPOMA, UNSPECIFIED SITE: Primary | ICD-10-CM

## 2019-08-08 PROCEDURE — 76882 US LMTD JT/FCL EVL NVASC XTR: CPT

## 2019-08-08 NOTE — PROGRESS NOTES
Spoke to patient informed her of Ultrasound results, patient states it is very painful what would you recommend.

## 2019-08-13 ENCOUNTER — TELEPHONE (OUTPATIENT)
Dept: FAMILY MEDICINE CLINIC | Age: 69
End: 2019-08-13

## 2019-08-13 NOTE — TELEPHONE ENCOUNTER
----- Message from Yohana Andrwes sent at 8/13/2019 12:24 PM EDT -----  Regarding: Dr. Crump Points / Issac Mendez (spouse) requesting call back. Caller says it concerns pt's recent ultrasound.  Best contact is [phone]

## 2019-08-13 NOTE — TELEPHONE ENCOUNTER
Notes recorded by Delfino Sparks MD on 8/8/2019 at 4:22 PM EDT  If very painful, would recommend pt to see surgery for removal. Referral placed.  ------  Notes recorded by Wong Sauceda LPN on 7/4/2881 at 0:70 PM EDT  Spoke to patient informed her of Ultrasound results, patient states it is very painful what would you recommend.  ------    Notes recorded by Delfino Sparks MD on 8/8/2019 at 12:21 PM EDT  Please call patient:    Ultrasound shows a benign fatty finding called a lipoma along her right armpit. This does not require any additional treatment unless it becomes significantly bigger or painful. Patient given name and number to Dr. Bin Burch on referral. Patient declined to see that general surgeon. Patient request name of another surgeon instead.

## 2019-08-14 NOTE — TELEPHONE ENCOUNTER
Outbound call to patient. Patient advised to see another provider in general surgeons office, and given that information below. Patient verbalized understanding.     Ailyn Elias   [facility].     [phone]

## 2019-08-14 NOTE — TELEPHONE ENCOUNTER
Pt is returning call back to the nurse. Pt stated that you left the message for Mr. Marzena Pepper which is the wrong number, however she was waiting for a call back from the nurse.         Best contact # [phone]

## 2019-08-29 ENCOUNTER — OFFICE VISIT (OUTPATIENT)
Dept: FAMILY MEDICINE CLINIC | Age: 69
End: 2019-08-29

## 2019-08-29 ENCOUNTER — HOSPITAL ENCOUNTER (OUTPATIENT)
Dept: GENERAL RADIOLOGY | Age: 69
Discharge: HOME | End: 2019-08-29
Payer: MEDICARE

## 2019-08-29 VITALS
SYSTOLIC BLOOD PRESSURE: 130 MMHG | DIASTOLIC BLOOD PRESSURE: 84 MMHG | OXYGEN SATURATION: 99 % | RESPIRATION RATE: 18 BRPM | BODY MASS INDEX: 38.45 KG/M2 | WEIGHT: 217 LBS | TEMPERATURE: 99.1 F | HEART RATE: 77 BPM | HEIGHT: 63 IN

## 2019-08-29 DIAGNOSIS — F20.9 SCHIZOPHRENIA, UNSPECIFIED TYPE (HCC): ICD-10-CM
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
## 2019-08-29 DIAGNOSIS — R07.81 RIB PAIN: Primary | ICD-10-CM

## 2019-08-29 DIAGNOSIS — F32.9 MAJOR DEPRESSIVE DISORDER, REMISSION STATUS UNSPECIFIED, UNSPECIFIED WHETHER RECURRENT: ICD-10-CM

## 2019-08-29 DIAGNOSIS — S00.83XA CONTUSION OF CHIN, INITIAL ENCOUNTER: ICD-10-CM

## 2019-08-29 DIAGNOSIS — R07.81 RIB PAIN: ICD-10-CM

## 2019-08-29 PROCEDURE — 71111 X-RAY EXAM RIBS/CHEST4/> VWS: CPT

## 2019-08-29 RX ORDER — MELOXICAM 15 MG/1
15 TABLET ORAL DAILY
Qty: 20 TAB | Refills: 0 | Status: SHIPPED | OUTPATIENT
Start: 2019-08-29 | End: 2019-09-23

## 2019-08-29 RX ORDER — SPIRONOLACTONE 25 MG
TABLET ORAL
COMMUNITY
Start: 2019-06-23

## 2019-08-29 RX ORDER — HALOPERIDOL 2 MG/ML
SOLUTION ORAL
COMMUNITY
Start: 2019-08-02 | End: 2019-09-23

## 2019-08-29 RX ORDER — CETIRIZINE HCL 10 MG
10 TABLET ORAL
COMMUNITY
Start: 2019-06-23 | End: 2019-09-23

## 2019-08-29 NOTE — PROGRESS NOTES
HISTORY OF PRESENT ILLNESS  Yung Yoo is a 71 y.o. female. HPI  Accompanied by  with c/o generalized body aches due to a fall she sustained 1 week ago. Reports she was in the yard and fell face forward onto the grass. She c/o chin pain that radiates to her right ear. C/o bilateral rib pain and generalized full body pains. She is taking both ibuprofen and tylenol simultaneously with little sx relief. Her  is requesting a referral to another psychiatrist.  She has history of schizophrenia and MDD followed by Dr. Zoya Mir. Reports they are unsatisfied with the care she is receiving. Dr. Zoya Mir does not return calls.  reports difficulty obtaining her haldol prescription. She has started to become forgetful and confused off the haldol.   Past Medical History:   Diagnosis Date    Allergic rhinitis due to other allergen     Anxiety     Arthritis     Asthma     in younger years - no inhaler use    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    MRI 2013; stable ischemic changes; hx of CVA    Depression 9/23/2009    Esophageal reflux     HTN, goal below 140/90     Hyperlipidemia 9/23/2009    IBS (irritable bowel syndrome)     Insomnia     Menopause     [id number]    Mixed hyperlipidemia     Obesity, unspecified     Osteopenia     started Fosamax therapy 4/2015    PFO (patent foramen ovale)     reportedly dx'ed with CVA in 2012    Prediabetes     [facility]ia ([facility]     Skin cancer     squamous cell skin cancer - removed    Stroke ([facility]     no deficits    Sun-damaged skin     TMJ (dislocation of temporomandibular joint)      Patient Active Problem List   Diagnosis Code    Insomnia G47.00    Depression F32.9    Esophageal reflux K21.9    Obesity, unspecified E66.9    Allergic rhinitis due to other allergen J30.89    Schizophrenia (HCC) F20.9    Anxiety F41.9    Arthritis M19.90    Asthma J45.909    HTN, goal below 140/90 I10    IBS (irritable bowel syndrome) K58.9    Osteopenia M85.80    Prediabetes R73.03    Skin cancer C44.90    History of CVA (cerebrovascular accident) Z80.78    Sun-damaged skin L57.8    PFO (patent foramen ovale) Q21.1    AVM (arteriovenous malformation) brain Q28.2    Hyperlipidemia E78.5    Advance care planning Z71.89    Severe obesity (HCC) E66.01     Current Outpatient Medications   Medication Sig    simvastatin (ZOCOR) 40 mg tablet TAKE 1 TABLET EVERY NIGHT    CALCIUM 600 WITH VITAMIN D3 600 mg(1,500mg) -400 unit chew     ECZEMA MOISTURIZING CREAM 1 % crea     haloperidol (HALDOL) 2 mg/mL oral concentrate     meloxicam (MOBIC) 15 mg tablet Take 1 Tab by mouth daily.  raNITIdine (ZANTAC) 150 mg tablet TAKE ONE TABLET BY MOUTH DAILY    hydroCHLOROthiazide (MICROZIDE) 12.5 mg capsule TAKE 1 CAPSULE EVERY DAY    peg 400-propylene glycol (SYSTANE, PROPYLENE GLYCOL,) 0.4-0.3 % drop Administer 2 Drops to both eyes daily.  DOCOSAHEXANOIC ACID/EPA (FISH OIL PO) Take 1 Cap by mouth two (2) times a day.  aspirin (ASPIRIN) 325 mg tablet Take 1 Tab by mouth daily.  FLUoxetine (PROZAC) 40 mg capsule Take 40 mg by mouth daily.  loratadine 10 mg Cap Take 10 mg by mouth daily.  cetirizine (ZYRTEC) 10 mg tablet     cyclobenzaprine (FLEXERIL) 5 mg tablet Take 1-2 Tabs by mouth three (3) times daily as needed for Muscle Spasm(s).  haloperidol (HALDOL) 1 mg tablet Take 1 Tab by mouth daily. No current facility-administered medications for this visit. Social History     Tobacco Use    Smoking status: Never Smoker    Smokeless tobacco: Never Used   Substance Use Topics    Alcohol use: No    Drug use: No     Blood pressure 130/84, pulse 77, temperature 99.1 °F (37.3 °C), temperature source Oral, resp. rate 18, height 5' 2.5\" (1.588 m), weight 217 lb (98.4 kg), SpO2 99 %. Review of Systems   Constitutional: Negative for chills, fever and malaise/fatigue. HENT: Positive for ear pain (right). Respiratory: Negative for cough and shortness of breath. Cardiovascular: Negative for chest pain and palpitations. Musculoskeletal:        Bilateral rib pain. Skin:        Abrasion of chin. Psychiatric/Behavioral: Positive for depression and hallucinations (reported). Negative for substance abuse and suicidal ideas. The patient is nervous/anxious. All other systems reviewed and are negative. Physical Exam   Constitutional: She is oriented to person, place, and time. No distress. Overweight. HENT:   Right Ear: Tympanic membrane and ear canal normal.   Left Ear: Tympanic membrane and ear canal normal.   Neck: Neck supple. Cardiovascular: Normal rate, regular rhythm and normal heart sounds. Pulmonary/Chest: Effort normal and breath sounds normal.   Abdominal: Soft. She exhibits no distension. There is no tenderness. There is no guarding. Musculoskeletal:   Bilateral lower rib cage TTP. Lymphadenopathy:     She has no cervical adenopathy. Neurological: She is alert and oriented to person, place, and time. Skin: Skin is warm and dry. Approximately quarter sized bruise of chin. Superficial abrasion. No swelling. Psychiatric: She has a normal mood and affect. Her behavior is normal.       ASSESSMENT and PLAN  Diagnoses and all orders for this visit:    1. Rib pain  -     XR RIBS BI W PA CHEST 4 VS; Future  -     meloxicam (MOBIC) 15 mg tablet; Take 1 Tab by mouth daily. Negative fracture. Trial mobic as directed. Instructed to stop ibuprofen. May continue tylenol for breakthrough pain prn.    2. Schizophrenia, unspecified type ([facility] [address] [address]  -     REFERRAL TO PSYCHIATRY  Recommend follow up with Dr. Krystal Elliott until she can re establish psychiatric care. Contact info given to patient to schedule appointment. 3. Fall, initial encounter    4.  Contusion of chin, initial encounter  Ice to affected area prn.    5. Major depressive disorder, remission status unspecified, unspecified whether recurrent  -     REFERRAL TO PSYCHIATRY  As above. Follow up prn.

## 2019-08-29 NOTE — PROGRESS NOTES
Chief Complaint   Patient presents with    Fall    Generalized Body Aches     frontal of body and sides sore from fall occurring one week ago. pain including shoulders  and knee     Ear Pain     right ear , radiating to jaw with shooting pain to head      1. Have you been to the ER, urgent care clinic since your last visit? Hospitalized since your last visit? No    2. Have you seen or consulted any other health care providers outside of the [address] [place] [address] since your last visit? Include any pap smears or colon screening.  No

## 2019-09-10 ENCOUNTER — OFFICE VISIT (OUTPATIENT)
Dept: SURGERY | Age: 69
End: 2019-09-10

## 2019-09-10 VITALS
WEIGHT: 217 LBS | RESPIRATION RATE: 20 BRPM | HEART RATE: 79 BPM | BODY MASS INDEX: 38.45 KG/M2 | OXYGEN SATURATION: 97 % | HEIGHT: 63 IN | SYSTOLIC BLOOD PRESSURE: 124 MMHG | DIASTOLIC BLOOD PRESSURE: 80 MMHG

## 2019-09-10 DIAGNOSIS — D17.21 LIPOMA OF RIGHT AXILLA: Primary | ICD-10-CM

## 2019-09-10 NOTE — PROGRESS NOTES
1. Have you been to the ER, urgent care clinic since your last visit? Hospitalized since your last visit? new patient    2. Have you seen or consulted any other health care providers outside of the [address] [place] [address] since your last visit? Include any pap smears or colon screening.  New patient

## 2019-09-10 NOTE — PATIENT INSTRUCTIONS
Lipoma: Care Instructions  Your Care Instructions  A lipoma is a growth of fat just below the skin. It may feel soft and rubbery. Lipomas can occur anywhere on the body. But they are most common on the torso, neck, upper thighs, upper arms, and armpits. A lipoma does not turn into cancer. Lipomas usually are not treated, because most of them don't hurt or cause problems. But your doctor may remove a lipoma if it is painful, gets infected, or bothers you. Follow-up care is a key part of your treatment and safety. Be sure to make and go to all appointments, and call your doctor if you are having problems. It's also a good idea to know your test results and keep a list of the medicines you take. How can you care for yourself at home? · A lipoma usually needs no care at home unless your doctor made a cut (incision) to remove it. · If your doctor told you how to care for your incision, follow your doctor's instructions. If you did not get instructions, follow this general advice:  ? Wash around the incision with clean water 2 times a day. Don't use hydrogen peroxide or alcohol. These can slow healing. ? You may cover the incision with a thin layer of petroleum jelly, such as Vaseline, and a nonstick bandage. ? Apply more petroleum jelly and replace the bandage as needed. When should you call for help? Call your doctor now or seek immediate medical care if:    · You have signs of infection, such as:  ? Increased pain, swelling, warmth, or redness. ? Red streaks leading from the lipoma. ? Pus draining from the lipoma. ? A fever.    Watch closely for changes in your health, and be sure to contact your doctor if:    · The lipoma is growing or changing.     · You do not get better as expected. Where can you learn more? Go to http://becka-derrick.info/. Enter E805 in the search box to learn more about \"Lipoma: Care Instructions. \"  Current as of: April 1, 2019  Content Version: 12.1  © 2313-3063 Healthwise, Incorporated. Care instructions adapted under license by [employer] (which disclaims liability or warranty for this information). If you have questions about a medical condition or this instruction, always ask your healthcare professional. [address] any warranty or liability for your use of this information.

## 2019-09-22 NOTE — PROGRESS NOTES
Surgery Consult    Subjective:      Alban Galan is a 71 y.o. female who is being seen for evaluation of right axillary mass. She notes recent development of right axillary SQ mass which is slightly tender to palpation. Lesion has not changed in size since its identification. No history of inflammation or drainage. She also denies chest pain or wheezing. She notes DJD-related pain and dyspnea with exertion.       Patient Active Problem List    Diagnosis Date Noted    Lipoma of right axilla 09/10/2019    Severe obesity ([address] 12/03/2018    Advance care planning 07/10/2017    Anxiety     Arthritis     Asthma     HTN, goal below 140/90     IBS (irritable bowel syndrome)     Osteopenia     Prediabetes     Skin cancer     History of CVA (cerebrovascular accident)     Sun-damaged skin     PFO (patent foramen ovale)     Schizophrenia ([address] 08/08/2012    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    Depression 09/23/2009    Hyperlipidemia 09/23/2009    Insomnia     Esophageal reflux     Obesity, unspecified     Allergic rhinitis due to other allergen      Past Medical History:   Diagnosis Date    Allergic rhinitis due to other allergen     Anxiety     Arthritis     Asthma     in younger years - no inhaler use    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    MRI 2013; stable ischemic changes; hx of CVA    Depression 9/23/2009    Esophageal reflux     HTN, goal below 140/90     Hyperlipidemia 9/23/2009    IBS (irritable bowel syndrome)     Insomnia     Menopause     [id number]    Mixed hyperlipidemia     Obesity, unspecified     Osteopenia     started Fosamax therapy 4/2015    PFO (patent foramen ovale)     reportedly dx'ed with CVA in 2012    Prediabetes     Schizophrenia ([address]     Skin cancer     squamous cell skin cancer - removed    Stroke ([address]     no deficits    Sun-damaged skin     TMJ (dislocation of temporomandibular joint)       Past Surgical History: Procedure Laterality Date    HX ADENOIDECTOMY      HX HEENT Bilateral     surgery for lazy eye    HX HYSTERECTOMY  1984    endometriosis    HX ORTHOPAEDIC      bone spur removed from right foot    HX TONSILLECTOMY        Social History     Tobacco Use    Smoking status: Never Smoker    Smokeless tobacco: Never Used   Substance Use Topics    Alcohol use: No      Family History   Problem Relation Age of Onset    Stroke Mother     Heart Disease Father     Other Sister         benign brain tumor/arthritis    Thyroid Disease Sister         goiter    Diabetes Sister       Current Outpatient Medications   Medication Sig    simvastatin (ZOCOR) 40 mg tablet TAKE 1 TABLET EVERY NIGHT    CALCIUM 600 WITH VITAMIN D3 600 mg(1,500mg) -400 unit chew     meloxicam (MOBIC) 15 mg tablet Take 1 Tab by mouth daily.  raNITIdine (ZANTAC) 150 mg tablet TAKE ONE TABLET BY MOUTH DAILY    hydroCHLOROthiazide (MICROZIDE) 12.5 mg capsule TAKE 1 CAPSULE EVERY DAY    DOCOSAHEXANOIC ACID/EPA (FISH OIL PO) Take 1 Cap by mouth two (2) times a day.  aspirin (ASPIRIN) 325 mg tablet Take 1 Tab by mouth daily.  FLUoxetine (PROZAC) 40 mg capsule Take 40 mg by mouth daily.  loratadine 10 mg Cap Take 10 mg by mouth daily.  ECZEMA MOISTURIZING CREAM 1 % crea     haloperidol (HALDOL) 2 mg/mL oral concentrate     cetirizine (ZYRTEC) 10 mg tablet     cyclobenzaprine (FLEXERIL) 5 mg tablet Take 1-2 Tabs by mouth three (3) times daily as needed for Muscle Spasm(s).  peg 400-propylene glycol (SYSTANE, PROPYLENE GLYCOL,) 0.4-0.3 % drop Administer 2 Drops to both eyes daily.  haloperidol (HALDOL) 1 mg tablet Take 1 Tab by mouth daily. No current facility-administered medications for this visit.        Allergies   Allergen Reactions    Bee Venom Protein (Honey Bee) Hives    Voltaren [Diclofenac Sodium] Diarrhea       Review of Systems:    A complete review of systems was negative except as noted in the HPI.    Objective:        Visit Vitals  /80   Pulse 79   Resp 20   Ht 5' 2.5\" (1.588 m)   Wt 217 lb (98.4 kg)   SpO2 97%   BMI 39.06 kg/m²       Physical Exam:  GENERAL: alert, cooperative, no distress, appears stated age, morbidly obese, EYE: negative, LYMPH NODES: Cervical, supraclavicular, and  nodes normal.  THROAT & NECK: normal, LUNG: clear to auscultation bilaterally, HEART: regular rate and rhythm, S1, S2 normal, no murmur. ABDOMEN: soft, non-tender. Bowel sounds normal. No masses,  no organomegaly, no hernia. EXTREMITIES:  extremities normal, atraumatic, no cyanosis or edema, SKIN: 2 x 1 cm right axillary subcutaneous mass (mobile, slightly tender to palpation). NEUROLOGIC: negative    Assessment:     Right axillary subcutaneous mass consistent with lipoma; lesion is symptomatic. Plan:     1. I recommend proceeding with excisional biopsy. 2. Discussed aspects of surgical intervention, methods, risks (including by not limited to infection, bleeding, hematoma, and recurrence), and the risks of the anesthetic. The patient understands the risks; all questions were answered to the patient's satisfaction. 3. Patient does wish to proceed with surgery.

## 2019-09-23 ENCOUNTER — HOSPITAL ENCOUNTER (OUTPATIENT)
Dept: CT IMAGING | Age: 69
Discharge: HOME | End: 2019-09-23
Attending: FAMILY MEDICINE
Payer: MEDICARE

## 2019-09-23 ENCOUNTER — OFFICE VISIT (OUTPATIENT)
Dept: FAMILY MEDICINE CLINIC | Age: 69
End: 2019-09-23

## 2019-09-23 VITALS
BODY MASS INDEX: 38.7 KG/M2 | HEART RATE: 66 BPM | WEIGHT: 218.4 LBS | SYSTOLIC BLOOD PRESSURE: 170 MMHG | TEMPERATURE: 98.1 F | RESPIRATION RATE: 18 BRPM | DIASTOLIC BLOOD PRESSURE: 88 MMHG | OXYGEN SATURATION: 98 % | HEIGHT: 63 IN

## 2019-09-23 DIAGNOSIS — M26.609 TMJ (TEMPOROMANDIBULAR JOINT DISORDER): ICD-10-CM

## 2019-09-23 DIAGNOSIS — R07.89 CHEST DISCOMFORT: Primary | ICD-10-CM

## 2019-09-23 DIAGNOSIS — S09.90XD INJURY OF HEAD, SUBSEQUENT ENCOUNTER: ICD-10-CM

## 2019-09-23 DIAGNOSIS — I10 HTN, GOAL BELOW 140/90: ICD-10-CM

## 2019-09-23 PROCEDURE — 70450 CT HEAD/BRAIN W/O DYE: CPT

## 2019-09-23 RX ORDER — MELOXICAM 15 MG/1
15 TABLET ORAL DAILY
Qty: 30 TAB | Refills: 0 | Status: CANCELLED | OUTPATIENT
Start: 2019-09-23

## 2019-09-23 NOTE — PROGRESS NOTES
Outbound call to patient. Patients date of birth verified. Patient made aware of lab results and verbalized understanding.

## 2019-09-23 NOTE — PATIENT INSTRUCTIONS
Chest Contusion: Care Instructions  Your Care Instructions  A chest contusion, or bruise, is caused by a fall or direct blow to the chest. Car crashes, falls, getting punched, and injury from bicycle handlebars are common causes of chest contusions. A very forceful blow to the chest can injure the heart or blood vessels in the chest, the lungs, the airway, the liver, or the spleen. Pain may be caused by an injury to muscles, cartilage, or ribs. Deep breathing, coughing, or sneezing can increase your pain. Lying on the injured area also can cause pain. Follow-up care is a key part of your treatment and safety. Be sure to make and go to all appointments, and call your doctor if you are having problems. It's also a good idea to know your test results and keep a list of the medicines you take. How can you care for yourself at home? · Rest and protect the injured or sore area. Stop, change, or take a break from any activity that may be causing your pain. · Put ice or a cold pack on the area for 10 to 20 minutes at a time. Put a thin cloth between the ice and your skin. · After 2 or 3 days, if your swelling is gone, apply a heating pad set on low or a warm cloth to your chest. Some doctors suggest that you go back and forth between hot and cold. Put a thin cloth between the heating pad and your skin. · Do not wrap or tape your ribs for support. This may cause you to take smaller breaths, which could increase your risk of pneumonia and lung collapse. · Ask your doctor if you can take an over-the-counter pain medicine, such as acetaminophen (Tylenol), ibuprofen (Advil, Motrin), or naproxen (Aleve). Be safe with medicines. Read and follow all instructions on the label. · Take your medicines exactly as prescribed. Call your doctor if you think you are having a problem with your medicine.   · Gentle stretching and massage may help you feel better after a few days of rest. Stretch slowly to the point just before discomfort begins, then hold the stretch for at least 15 to 30 seconds. Do this 3 or 4 times per day. · As your pain gets better, slowly return to your normal activities. Be patient, because chest bruises can take weeks or months to heal. Any increased pain may be a sign that you need to rest a while longer. When should you call for help? Call 911 anytime you think you may need emergency care. For example, call if:    · You have severe trouble breathing.     · You cough up blood.    Call your doctor now or seek immediate medical care if:    · You have belly pain.     · You are dizzy or lightheaded, or you feel like you may faint.     · You develop new symptoms with the chest pain.     · Your chest pain gets worse.     · You have a fever.     · You have some shortness of breath.     · You have a cough that brings up mucus from the lungs.    Watch closely for changes in your health, and be sure to contact your doctor if:    · Your chest pain is not improving after 1 week. Where can you learn more? Go to http://becka-derrick.info/. Enter I174 in the search box to learn more about \"Chest Contusion: Care Instructions. \"  Current as of: June 26, 2019  Content Version: 12.2  © 3369-3215 [employer]. Care instructions adapted under license by [employer] (which disclaims liability or warranty for this information). If you have questions about a medical condition or this instruction, always ask your healthcare professional. [place] [address] any warranty or liability for your use of this information.

## 2019-09-23 NOTE — PROGRESS NOTES
Chief Complaint   Patient presents with    Fall     x 5-6 weeks ago, has pain on chest (fell on it) and back pain    TMJ     1. Have you been to the ER, urgent care clinic since your last visit? Hospitalized since your last visit? No    2. Have you seen or consulted any other health care providers outside of the [address] [place] [address] since your last visit? Include any pap smears or colon screening. No    Patient reports feeling confused and having problems with her memory due to severe body pain.

## 2019-09-23 NOTE — PROGRESS NOTES
Patient Name: Lali Degroot   MRN: [id number]    Fleta Simmonds is a 71 y.o. female who presents with the following: Here today with . Patient had a ground-level fall where she tripped and face planted in the yard about a month ago. She was seen in the office for ongoing rib pain. X-ray was negative for rib fractures and she was given meloxicam.  States that the meloxicam helped occasionally but had ran out of it a few days ago and is been taking Tylenol and ibuprofen. Currently, she states that she has pain along her breastbone associated with some nausea. She also has ongoing right ear pain which is thought to be due to chronic TMJ. She is set to see an oral surgeon in a few weeks. She saw an ENT for this who gave her a muscle relaxant which did not help her symptoms. Has not tried a mouthguard or jaw exercises.  is concerned about patient's mental acuity since her fall. She seems more confused and having some difficulty formulating sentences. She is unsure if she has lost consciousness during the episode which was unwitnessed but does recall hitting her head. BP Readings from Last 3 Encounters:   09/23/19 170/88   09/10/19 124/80   08/29/19 130/84     XR Results (most recent):  Results from [place] encounter on 08/29/19   XR RIBS BI W PA CHEST 4 VS    Narrative EXAM:  XR RIBS BI W PA CHEST 4 VS    INDICATION:   bilateral rib pain s/p fall 1 week ago    COMPARISON: None. FINDINGS: Single view of the chest and 4 views of the bilateral ribs were  obtained. Lungs are clear with no focal consolidation, pleural effusion, or  pneumothorax. Heart size is normal with calcifications of the thoracic aorta. No  evidence of acute rib fracture. Impression IMPRESSION:    1. No evidence of acute rib fracture. No acute cardiopulmonary process. Review of Systems   Constitutional: Negative for fever, malaise/fatigue and weight loss.    Respiratory: Negative for cough, hemoptysis, shortness of breath and wheezing. Cardiovascular: Positive for chest pain. Negative for palpitations, leg swelling and PND. Gastrointestinal: Positive for nausea. Negative for abdominal pain, constipation, diarrhea and vomiting. Musculoskeletal: Positive for back pain and myalgias. Neurological: Negative for tingling, tremors, focal weakness, seizures and headaches. The patient's medications, allergies, past medical history, surgical history, family history and social history were reviewed and updated where appropriate. Prior to Admission medications    Medication Sig Start Date End Date Taking? Authorizing Provider   simvastatin (ZOCOR) 40 mg tablet TAKE 1 TABLET EVERY NIGHT 8/29/19  Yes Michele Grissom MD   CALCIUM 600 WITH VITAMIN D3 600 mg(1,500mg) -400 unit chew  6/23/19  Yes Provider, Historical   ECZEMA MOISTURIZING CREAM 1 % crea  6/23/19  Yes Provider, Historical   raNITIdine (ZANTAC) 150 mg tablet TAKE ONE TABLET BY MOUTH DAILY 6/13/19  Yes Michele Grissom MD   hydroCHLOROthiazide (MICROZIDE) 12.5 mg capsule TAKE 1 CAPSULE EVERY DAY 5/23/19  Yes Michele Grissom MD   peg 400-propylene glycol (SYSTANE, PROPYLENE GLYCOL,) 0.4-0.3 % drop Administer 2 Drops to both eyes as needed. Yes Provider, Historical   DOCOSAHEXANOIC ACID/EPA (FISH OIL PO) Take 1 Cap by mouth two (2) times a day. Yes Provider, Historical   aspirin (ASPIRIN) 325 mg tablet Take 1 Tab by mouth daily. 6/13/13  Yes Christopher Sharma MD   FLUoxetine (PROZAC) 40 mg capsule Take 40 mg by mouth daily. Yes Provider, Historical   loratadine 10 mg Cap Take 10 mg by mouth daily. Yes Provider, Historical   haloperidol (HALDOL) 2 mg/mL oral concentrate  8/2/19   Provider, Historical   cetirizine (ZYRTEC) 10 mg tablet Take 10 mg by mouth. 6/23/19   Provider, Historical   meloxicam (MOBIC) 15 mg tablet Take 1 Tab by mouth daily.  8/29/19   Janny Lenz, NP   cyclobenzaprine (FLEXERIL) 5 mg tablet Take 1-2 Tabs by mouth three (3) times daily as needed for Muscle Spasm(s). 3/21/19   Joao Anne NP   haloperidol (HALDOL) 1 mg tablet Take 1 Tab by mouth daily. 3/20/15   Kranthi Roblero MD       Allergies   Allergen Reactions    Bee Venom Protein (Honey Bee) Hives    Voltaren [Diclofenac Sodium] Diarrhea           OBJECTIVE    Visit Vitals  /88 (BP 1 Location: Right arm, BP Patient Position: Sitting)   Pulse 66   Temp 98.1 °F (36.7 °C) (Oral)   Resp 18   Ht 5' 2.5\" (1.588 m)   Wt 218 lb 6.4 oz (99.1 kg)   SpO2 98%   BMI 39.31 kg/m²       Physical Exam   Constitutional: She is oriented to person, place, and time and well-developed, well-nourished, and in no distress. No distress. Eyes: Pupils are equal, round, and reactive to light. Conjunctivae and EOM are normal.   Cardiovascular: Normal rate, regular rhythm and normal heart sounds. Exam reveals no gallop and no friction rub. No murmur heard. Pulmonary/Chest: Effort normal and breath sounds normal. No respiratory distress. She has no wheezes. She exhibits tenderness (TTP along sternum). Neurological: She is alert and oriented to person, place, and time. Skin: Skin is warm and dry. No rash noted. She is not diaphoretic. Psychiatric: Mood, memory, affect and judgment normal.   Nursing note and vitals reviewed. ASSESSMENT AND PLAN  Armand Magallon is a 71 y.o. female who presents today for:    1. Chest discomfort  Recommend patient to use extra strength Tylenol and lidocaine patches. Encourage regular activity as patient has remained sedentary since her fall. Would stop NSAIDs given possible gastritis as she does have a history of this. If symptoms persist, could consider reimaging ribs to see if there is a rib/sternal fracture. 2. Injury of head, subsequent encounter  Will rule out intracranial pathology. Symptoms may also be due to side effects of her psychotropic medications. - CT HEAD WO CONT; Future    3.  TMJ (temporomandibular joint disorder)  TMJ exercises given. Patient to follow-up with oral surgeon. 4. HTN, goal below 140/90   Elevated today due to pain. Previously has been well controlled. Medications Discontinued During This Encounter   Medication Reason    meloxicam (MOBIC) 15 mg tablet     cetirizine (ZYRTEC) 10 mg tablet     haloperidol (HALDOL) 2 mg/mL oral concentrate     haloperidol (HALDOL) 1 mg tablet            Medication risks/benefits/costs/interactions/alternatives discussed with patient. Advised patient to call back or return to office if symptoms worsen/change/persist. If patient cannot reach us or should anything more severe/urgent arise he/she should proceed directly to the nearest emergency department. Discussed expected course/resolution/complications of diagnosis in detail with patient. Patient given a written after visit summary which includes his/her diagnoses, current medications and vitals. Patient expressed understanding with the diagnosis and plan. Yareli Aparicio M.D.

## 2019-11-18 RX ORDER — HYDROCHLOROTHIAZIDE 12.5 MG/1
CAPSULE ORAL
Qty: 90 CAP | Refills: 1 | Status: SHIPPED | OUTPATIENT
Start: 2019-11-18 | End: 2020-05-07

## 2019-12-03 ENCOUNTER — OFFICE VISIT (OUTPATIENT)
Dept: FAMILY MEDICINE CLINIC | Age: 69
End: 2019-12-03

## 2019-12-03 VITALS
WEIGHT: 216.8 LBS | TEMPERATURE: 98 F | BODY MASS INDEX: 38.41 KG/M2 | RESPIRATION RATE: 18 BRPM | OXYGEN SATURATION: 97 % | HEART RATE: 66 BPM | HEIGHT: 63 IN | SYSTOLIC BLOOD PRESSURE: 128 MMHG | DIASTOLIC BLOOD PRESSURE: 72 MMHG

## 2019-12-03 DIAGNOSIS — K20.90 ESOPHAGITIS: ICD-10-CM

## 2019-12-03 DIAGNOSIS — R73.03 PREDIABETES: ICD-10-CM

## 2019-12-03 DIAGNOSIS — Z00.00 MEDICARE ANNUAL WELLNESS VISIT, SUBSEQUENT: Primary | ICD-10-CM

## 2019-12-03 DIAGNOSIS — E78.5 HYPERLIPIDEMIA, UNSPECIFIED HYPERLIPIDEMIA TYPE: ICD-10-CM

## 2019-12-03 DIAGNOSIS — I10 HTN, GOAL BELOW 140/90: ICD-10-CM

## 2019-12-03 RX ORDER — BISMUTH SUBSALICYLATE 262 MG
1 TABLET,CHEWABLE ORAL DAILY
COMMUNITY
End: 2022-06-26

## 2019-12-03 RX ORDER — HALOPERIDOL 1 MG/1
1 TABLET ORAL EVERY OTHER DAY
COMMUNITY
Start: 2019-11-25

## 2019-12-03 RX ORDER — FAMOTIDINE 20 MG/1
20 TABLET, FILM COATED ORAL
Qty: 180 TAB | Refills: 1 | Status: SHIPPED | OUTPATIENT
Start: 2019-12-03 | End: 2020-06-01

## 2019-12-03 NOTE — PROGRESS NOTES
Chief Complaint   Patient presents with   [facility] 39 Visit     G [id number]     1. Have you been to the ER, urgent care clinic since your last visit? Hospitalized since your last visit? No    2. Have you seen or consulted any other health care providers outside of the [address] [place] [address] since your last visit? Include any pap smears or colon screening.  No

## 2019-12-03 NOTE — PATIENT INSTRUCTIONS
Medicare Wellness Visit, Female The best way to live healthy is to have a lifestyle where you eat a well-balanced diet, exercise regularly, limit alcohol use, and quit all forms of tobacco/nicotine, if applicable. Regular preventive services are another way to keep healthy. Preventive services (vaccines, screening tests, monitoring & exams) can help personalize your care plan, which helps you manage your own care. Screening tests can find health problems at the earliest stages, when they are easiest to treat. [facility] follows the current, evidence-based guidelines published by the [facility]iz ([facility]) when recommending preventive services for our patients. Because we follow these guidelines, sometimes recommendations change over time as research supports it. (For example, mammograms used to be recommended annually. Even though Medicare will still pay for an annual mammogram, the newer guidelines recommend a mammogram every two years for women of average risk). Of course, you and your doctor may decide to screen more often for some diseases, based on your risk and your co-morbidities (chronic disease you are already diagnosed with). Preventive services for you include: - Medicare offers their members a free annual wellness visit, which is time for you and your primary care provider to discuss and plan for your preventive service needs. Take advantage of this benefit every year! 
-All adults over the age of 72 should receive the recommended pneumonia vaccines. Current USPSTF guidelines recommend a series of two vaccines for the best pneumonia protection.  
-All adults should have a flu vaccine yearly and a tetanus vaccine every 10 years.  
-All adults age 48 and older should receive the shingles vaccines (series of two vaccines). -All adults age 38-68 who are overweight should have a diabetes screening test once every three years. -All adults born between [birth year] and 1965 should be screened once for Hepatitis C. 
-Other screening tests and preventive services for persons with diabetes include: an eye exam to screen for diabetic retinopathy, a kidney function test, a foot exam, and stricter control over your cholesterol.  
-Cardiovascular screening for adults with routine risk involves an electrocardiogram (ECG) at intervals determined by your doctor.  
-Colorectal cancer screenings should be done for adults age 54-65 with no increased risk factors for colorectal cancer. There are a number of acceptable methods of screening for this type of cancer. Each test has its own benefits and drawbacks. Discuss with your doctor what is most appropriate for you during your annual wellness visit. The different tests include: colonoscopy (considered the best screening method), a fecal occult blood test, a fecal DNA test, and sigmoidoscopy. 
 
-A bone mass density test is recommended when a woman turns 65 to screen for osteoporosis. This test is only recommended one time, as a screening. Some providers will use this same test as a disease monitoring tool if you already have osteoporosis. -Breast cancer screenings are recommended every other year for women of normal risk, age 54-69. 
-Cervical cancer screenings for women over age 72 are only recommended with certain risk factors. Here is a list of your current Health Maintenance items (your personalized list of preventive services) with a due date: 
Health Maintenance Due Topic Date Due  Shingles Vaccine (1 of 2) 03/12/2000 Toño Gregory Annual Well Visit  12/04/2019

## 2019-12-03 NOTE — PROGRESS NOTES
Patient Name: Ar Menendez   MRN: [id number]    Cy Rodriguez is a 71 y.o. female who presents with the following: The patient has hypertension, hyperlipidemia and pre-dm. She reports taking medications as instructed, no medication side effects noted. Diet and Lifestyle: generally follows a low fat low cholesterol diet, generally follows a low sodium diet, no formal exercise but active during the day. Lab review: orders written for new lab studies as appropriate; see orders. Hx of esophagitis. Ran out of Zantac a few weeks ago. Doing okay but would like something else to use prn. Would like more advice on how to eat healthier and prepare vegetables. Review of Systems   Constitutional: Negative for fever, malaise/fatigue and weight loss. Respiratory: Negative for cough, hemoptysis, shortness of breath and wheezing. Cardiovascular: Negative for chest pain, palpitations, leg swelling and PND. Gastrointestinal: Positive for heartburn. Negative for abdominal pain, constipation, diarrhea, nausea and vomiting. The patient's medications, allergies, past medical history, surgical history, family history and social history were reviewed and updated where appropriate. Prior to Admission medications    Medication Sig Start Date End Date Taking? Authorizing Provider   haloperidol (HALDOL) 1 mg tablet Take 1 mg by mouth nightly. 11/25/19  Yes Provider, Historical   multivitamin (ONE A DAY) tablet Take 1 Tab by mouth daily. Yes Provider, Historical   hydroCHLOROthiazide (MICROZIDE) 12.5 mg capsule TAKE 1 CAPSULE EVERY DAY 11/18/19  Yes Rick Mohr MD   simvastatin (ZOCOR) 40 mg tablet TAKE 1 TABLET EVERY NIGHT  Patient taking differently: Take 40 mg by mouth nightly. 8/29/19  Yes Rick Mohr MD   CALCIUM 600 WITH VITAMIN D3 600 mg(1,500mg) -400 unit chew  6/23/19  Yes Provider, Historical   ECZEMA MOISTURIZING CREAM 1 % crea Apply  to affected area as needed.  6/23/19 Yes Provider, Historical   peg 400-propylene glycol (SYSTANE, PROPYLENE GLYCOL,) 0.4-0.3 % drop Administer 2 Drops to both eyes as needed. Yes Provider, Historical   DOCOSAHEXANOIC ACID/EPA (FISH OIL PO) Take 1 Cap by mouth two (2) times a day. Yes Provider, Historical   aspirin (ASPIRIN) 325 mg tablet Take 1 Tab by mouth daily. 6/13/13  Yes Elliott Monterroso MD   FLUoxetine (PROZAC) 40 mg capsule Take 40 mg by mouth daily. Yes Provider, Historical   loratadine 10 mg Cap Take 10 mg by mouth daily. Yes Provider, Historical   raNITIdine (ZANTAC) 150 mg tablet TAKE ONE TABLET BY MOUTH DAILY  Patient taking differently: Take 150 mg by mouth. 9/24/19   Allen Haynes MD   cyclobenzaprine (FLEXERIL) 5 mg tablet Take 1-2 Tabs by mouth three (3) times daily as needed for Muscle Spasm(s). 3/21/19   Joshua Mercado NP       Allergies   Allergen Reactions    Bee Venom Protein (Honey Bee) Hives    Voltaren [Diclofenac Sodium] Diarrhea           OBJECTIVE    Visit Vitals  /72 (BP 1 Location: Left arm, BP Patient Position: Sitting)   Pulse 66   Temp 98 °F (36.7 °C) (Oral)   Resp 18   Ht 5' 2.5\" (1.588 m)   Wt 216 lb 12.8 oz (98.3 kg)   SpO2 97%   BMI 39.02 kg/m²       Physical Exam  Vitals signs and nursing note reviewed. Constitutional:       General: She is not in acute distress. Appearance: She is not diaphoretic. Eyes:      Conjunctiva/sclera: Conjunctivae normal.      Pupils: Pupils are equal, round, and reactive to light. Musculoskeletal: Normal range of motion. Skin:     General: Skin is warm and dry. Neurological:      Mental Status: She is alert and oriented to person, place, and time. Gait: Gait is intact. Psychiatric:         Mood and Affect: Mood and affect normal.         Cognition and Memory: Memory normal.         Judgment: Judgment normal.           ASSESSMENT AND PLAN  Jonn Dupree is a 71 y.o. female who presents today for:    1.  Medicare annual wellness visit, subsequent  See other note. 2. HTN, goal below 140/90  Stable, continue current treatment. I have reviewed/discussed the above normal BMI with the patient. I have recommended the following interventions: dietary management education, guidance, and counseling, encourage exercise, monitor weight and prescribed dietary intake. 3. Hyperlipidemia, unspecified hyperlipidemia type  - REFERRAL TO NUTRITION  - CBC W/O DIFF  - METABOLIC PANEL, COMPREHENSIVE  - LIPID PANEL    4. Prediabetes  - REFERRAL TO NUTRITION  - HEMOGLOBIN A1C WITH EAG    5. Esophagitis  Will switch to prn Pepcid. - REFERRAL TO NUTRITION       Medications Discontinued During This Encounter   Medication Reason    cyclobenzaprine (FLEXERIL) 5 mg tablet     raNITIdine (ZANTAC) 150 mg tablet        Follow-up and Dispositions    · Return in about 6 months (around 6/3/2020) for HTN follow up. Medication risks/benefits/costs/interactions/alternatives discussed with patient. Advised patient to call back or return to office if symptoms worsen/change/persist. If patient cannot reach us or should anything more severe/urgent arise he/she should proceed directly to the nearest emergency department. Discussed expected course/resolution/complications of diagnosis in detail with patient. Patient given a written after visit summary which includes his/her diagnoses, current medications and vitals. Patient expressed understanding with the diagnosis and plan. Yareli Castro M.D.

## 2019-12-03 NOTE — PROGRESS NOTES
This is the Subsequent Medicare Annual Wellness Exam, performed 12 months or more after the Initial AWV or the last Subsequent AWV    I have reviewed the patient's medical history in detail and updated the computerized patient record.      History     Patient Active Problem List   Diagnosis Code    Insomnia G47.00    Depression F32.9    Esophageal reflux K21.9    Obesity, unspecified E66.9    Allergic rhinitis due to other allergen J30.89    Schizophrenia (HCC) F20.9    Anxiety F41.9    Arthritis M19.90    Asthma J45.909    HTN, goal below 140/90 I10    IBS (irritable bowel syndrome) K58.9    Osteopenia M85.80    Prediabetes R73.03    Skin cancer C44.90    History of CVA (cerebrovascular accident) Z80.78    Sun-damaged skin L57.8    PFO (patent foramen ovale) Q21.1    AVM (arteriovenous malformation) brain Q28.2    Hyperlipidemia E78.5    Advance care planning Z71.89    Severe obesity (HCC) E66.01    Lipoma of right axilla D17.21     Past Medical History:   Diagnosis Date    Allergic rhinitis due to other allergen     Anxiety     Arthritis     Asthma     in younger years - no inhaler use    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    MRI 2013; stable ischemic changes; hx of CVA    Depression 9/23/2009    Esophageal reflux     HTN, goal below 140/90     Hyperlipidemia 9/23/2009    IBS (irritable bowel syndrome)     Insomnia     Menopause     [id number]    Mixed hyperlipidemia     Obesity, unspecified     Osteopenia     started Fosamax therapy 4/2015    PFO (patent foramen ovale)     reportedly dx'ed with CVA in 2012    Prediabetes     [facility]ia ([facility]     Skin cancer     squamous cell skin cancer - removed    Stroke ([facility]     no deficits    Sun-damaged skin     TMJ (dislocation of temporomandibular joint)       Past Surgical History:   Procedure Laterality Date    HX ADENOIDECTOMY      HX HEENT Bilateral     surgery for lazy eye    HX HYSTERECTOMY  1984 endometriosis    HX ORTHOPAEDIC      bone spur removed from right foot    HX TONSILLECTOMY       Current Outpatient Medications   Medication Sig Dispense Refill    haloperidol (HALDOL) 1 mg tablet Take 1 mg by mouth nightly.  multivitamin (ONE A DAY) tablet Take 1 Tab by mouth daily.  famotidine (PEPCID) 20 mg tablet Take 1 Tab by mouth two (2) times daily as needed (GERD). 180 Tab 1    hydroCHLOROthiazide (MICROZIDE) 12.5 mg capsule TAKE 1 CAPSULE EVERY DAY 90 Cap 1    simvastatin (ZOCOR) 40 mg tablet TAKE 1 TABLET EVERY NIGHT (Patient taking differently: Take 40 mg by mouth nightly.) 90 Tab 3    CALCIUM 600 WITH VITAMIN D3 600 mg(1,500mg) -400 unit chew       ECZEMA MOISTURIZING CREAM 1 % crea Apply  to affected area as needed.  peg 400-propylene glycol (SYSTANE, PROPYLENE GLYCOL,) 0.4-0.3 % drop Administer 2 Drops to both eyes as needed.  DOCOSAHEXANOIC ACID/EPA (FISH OIL PO) Take 1 Cap by mouth two (2) times a day.  aspirin (ASPIRIN) 325 mg tablet Take 1 Tab by mouth daily. 365 Tab 0    FLUoxetine (PROZAC) 40 mg capsule Take 40 mg by mouth daily.  loratadine 10 mg Cap Take 10 mg by mouth daily.        Allergies   Allergen Reactions    Bee Venom Protein (Honey Bee) Hives    Voltaren [Diclofenac Sodium] Diarrhea       Family History   Problem Relation Age of Onset    Stroke Mother     Heart Disease Father     Other Sister         benign brain tumor/arthritis    Thyroid Disease Sister         goiter    Diabetes Sister      Social History     Tobacco Use    Smoking status: Never Smoker    Smokeless tobacco: Never Used   Substance Use Topics    Alcohol use: No       Depression Risk Factor Screening:     3 most recent PHQ Screens 6/3/2019   Little interest or pleasure in doing things Nearly every day   Feeling down, depressed, irritable, or hopeless More than half the days   Total Score PHQ 2 5       Alcohol Risk Factor Screening:   Do you average 1 drink per night or more than 7 drinks a week:  No    On any one occasion in the past three months have you have had more than 3 drinks containing alcohol:  No      Functional Ability and Level of Safety:   Hearing: Hearing is good. Activities of Daily Living: The home contains: no safety equipment. Patient does total self care    Ambulation: with no difficulty    Fall Risk:  Fall Risk Assessment, last 12 mths 9/23/2019   Able to walk? Yes   Fall in past 12 months? -   Fall with injury? -   Number of falls in past 12 months -   Fall Risk Score -       Abuse Screen:  Patient is not abused    Cognitive Screening   Has your family/caregiver stated any concerns about your memory: no      Patient Care Team   Patient Care Team:  Praveen Maldonado MD as PCP - General (Family Practice)  Praveen Maldonado MD [facility] PCP [facility]  Tasia Ospina MD (Psychiatry)  Anders Suggs MD (Cardiology)  Arabella Núñez MD as Physician (Gastroenterology)  Elizabeth Richmond DO (Dermatology)  Abebe Barrett MD as Physician (Ophthalmology)    Assessment/Plan   Education and counseling provided:  Are appropriate based on today's review and evaluation    Diagnoses and all orders for this visit:    1. Medicare annual wellness visit, subsequent    2. HTN, goal below 140/90    3. Hyperlipidemia, unspecified hyperlipidemia type  -     REFERRAL TO NUTRITION  -     CBC W/O DIFF  -     METABOLIC PANEL, COMPREHENSIVE  -     LIPID PANEL    4. Prediabetes  -     REFERRAL TO NUTRITION  -     HEMOGLOBIN A1C WITH EAG    5. Dysphagia, unspecified type  -     REFERRAL TO NUTRITION    Other orders  -     famotidine (PEPCID) 20 mg tablet; Take 1 Tab by mouth two (2) times daily as needed (GERD).         Health Maintenance Due   Topic Date Due    Shingrix Vaccine Age 49> (1 of 2) 03/12/2000    MEDICARE YEARLY EXAM  12/04/2019

## 2019-12-04 LAB
ALBUMIN SERPL-MCNC: 4.5 G/DL (ref 3.6–4.8)
ALBUMIN/GLOB SERPL: 1.8 {RATIO} (ref 1.2–2.2)
ALP SERPL-CCNC: 73 IU/L (ref 39–117)
ALT SERPL-CCNC: 35 IU/L (ref 0–32)
AST SERPL-CCNC: 27 IU/L (ref 0–40)
BILIRUB SERPL-MCNC: 0.6 MG/DL (ref 0–1.2)
BUN SERPL-MCNC: 14 MG/DL (ref 8–27)
BUN/CREAT SERPL: 14 (ref 12–28)
CALCIUM SERPL-MCNC: 10 MG/DL (ref 8.7–10.3)
CHLORIDE SERPL-SCNC: 98 MMOL/L (ref 96–106)
CHOLEST SERPL-MCNC: 113 MG/DL (ref 100–199)
CO2 SERPL-SCNC: 27 MMOL/L (ref 20–29)
CREAT SERPL-MCNC: 1 MG/DL (ref 0.57–1)
ERYTHROCYTE [DISTWIDTH] IN BLOOD BY AUTOMATED COUNT: 12.2 % (ref 12.3–15.4)
EST. AVERAGE GLUCOSE BLD GHB EST-MCNC: 111 MG/DL
GLOBULIN SER CALC-MCNC: 2.5 G/DL (ref 1.5–4.5)
GLUCOSE SERPL-MCNC: 96 MG/DL (ref 65–99)
HBA1C MFR BLD: 5.5 % (ref 4.8–5.6)
HCT VFR BLD AUTO: 41.8 % (ref 34–46.6)
HDLC SERPL-MCNC: 43 MG/DL
HGB BLD-MCNC: 13.9 G/DL (ref 11.1–15.9)
INTERPRETATION, 910389: NORMAL
INTERPRETATION: NORMAL
LDLC SERPL CALC-MCNC: 53 MG/DL (ref 0–99)
MCH RBC QN AUTO: 30.1 PG (ref 26.6–33)
MCHC RBC AUTO-ENTMCNC: 33.3 G/DL (ref 31.5–35.7)
MCV RBC AUTO: 91 FL (ref 79–97)
PDF IMAGE, 910387: NORMAL
PLATELET # BLD AUTO: 280 X10E3/UL (ref 150–450)
POTASSIUM SERPL-SCNC: 4.1 MMOL/L (ref 3.5–5.2)
PROT SERPL-MCNC: 7 G/DL (ref 6–8.5)
RBC # BLD AUTO: 4.62 X10E6/UL (ref 3.77–5.28)
SODIUM SERPL-SCNC: 141 MMOL/L (ref 134–144)
TRIGL SERPL-MCNC: 83 MG/DL (ref 0–149)
VLDLC SERPL CALC-MCNC: 17 MG/DL (ref 5–40)
WBC # BLD AUTO: 9.8 X10E3/UL (ref 3.4–10.8)

## 2019-12-05 NOTE — PROGRESS NOTES
Dear Ms. Maci Crisostomo,    I wanted to follow up on your recent test results:    Good news, your glucose and cholesterol levels are normal. No medication changes at this time.

## 2020-03-18 ENCOUNTER — TELEPHONE (OUTPATIENT)
Dept: FAMILY MEDICINE CLINIC | Age: 70
End: 2020-03-18

## 2020-03-18 DIAGNOSIS — H26.9 CATARACT OF BOTH EYES, UNSPECIFIED CATARACT TYPE: Primary | ICD-10-CM

## 2020-03-18 NOTE — TELEPHONE ENCOUNTER
Patients  is calling stating that pt is scheduled for the cataract surgery for both eyes,  Rt Eye April 22nd and Left Eye may 13th. He is requetsing a referral for Dr Romie Michelle.  Pre-op is April 15th with Dr Rey Weiner    BCB# [phone]  LOV : December 3, 2019  10:30 AM

## 2020-03-18 NOTE — TELEPHONE ENCOUNTER
Outbound call to patient  who is on PHI. Patients  made aware that referral has been placed for Dr. Thiago Sanchez office. Patients  verbalized understanding.

## 2020-05-07 RX ORDER — HYDROCHLOROTHIAZIDE 12.5 MG/1
CAPSULE ORAL
Qty: 90 CAP | Refills: 1 | Status: SHIPPED | OUTPATIENT
Start: 2020-05-07 | End: 2020-08-20

## 2020-06-01 RX ORDER — FAMOTIDINE 20 MG/1
TABLET, FILM COATED ORAL
Qty: 180 TAB | Refills: 1 | Status: SHIPPED | OUTPATIENT
Start: 2020-06-01 | End: 2020-11-02

## 2020-08-20 ENCOUNTER — PATIENT MESSAGE (OUTPATIENT)
Dept: FAMILY MEDICINE CLINIC | Age: 70
End: 2020-08-20

## 2020-08-20 RX ORDER — HYDROCHLOROTHIAZIDE 12.5 MG/1
CAPSULE ORAL
Qty: 90 CAP | Refills: 1 | Status: SHIPPED | OUTPATIENT
Start: 2020-08-20 | End: 2021-02-18

## 2020-08-20 RX ORDER — SIMVASTATIN 40 MG/1
TABLET, FILM COATED ORAL
Qty: 90 TAB | Refills: 0 | Status: SHIPPED | OUTPATIENT
Start: 2020-08-20 | End: 2020-12-07

## 2020-09-21 ENCOUNTER — OFFICE VISIT (OUTPATIENT)
Dept: FAMILY MEDICINE CLINIC | Age: 70
End: 2020-09-21
Payer: MEDICARE

## 2020-09-21 VITALS
SYSTOLIC BLOOD PRESSURE: 120 MMHG | HEIGHT: 63 IN | HEART RATE: 68 BPM | RESPIRATION RATE: 16 BRPM | OXYGEN SATURATION: 97 % | WEIGHT: 221.4 LBS | TEMPERATURE: 98.2 F | BODY MASS INDEX: 39.23 KG/M2 | DIASTOLIC BLOOD PRESSURE: 68 MMHG

## 2020-09-21 DIAGNOSIS — E66.01 SEVERE OBESITY (HCC): ICD-10-CM

## 2020-09-21 DIAGNOSIS — R41.89 IMPAIRED COGNITION: ICD-10-CM

## 2020-09-21 DIAGNOSIS — I10 HTN, GOAL BELOW 140/90: Primary | ICD-10-CM

## 2020-09-21 DIAGNOSIS — F20.9 SCHIZOPHRENIA, UNSPECIFIED TYPE (HCC): ICD-10-CM

## 2020-09-21 PROCEDURE — G9717 DOC PT DX DEP/BP F/U NT REQ: HCPCS | Performed by: FAMILY MEDICINE

## 2020-09-21 PROCEDURE — G9899 SCRN MAM PERF RSLTS DOC: HCPCS | Performed by: FAMILY MEDICINE

## 2020-09-21 PROCEDURE — 1090F PRES/ABSN URINE INCON ASSESS: CPT | Performed by: FAMILY MEDICINE

## 2020-09-21 PROCEDURE — 3017F COLORECTAL CA SCREEN DOC REV: CPT | Performed by: FAMILY MEDICINE

## 2020-09-21 PROCEDURE — G8536 NO DOC ELDER MAL SCRN: HCPCS | Performed by: FAMILY MEDICINE

## 2020-09-21 PROCEDURE — G8427 DOCREV CUR MEDS BY ELIG CLIN: HCPCS | Performed by: FAMILY MEDICINE

## 2020-09-21 PROCEDURE — 99214 OFFICE O/P EST MOD 30 MIN: CPT | Performed by: FAMILY MEDICINE

## 2020-09-21 PROCEDURE — G8754 DIAS BP LESS 90: HCPCS | Performed by: FAMILY MEDICINE

## 2020-09-21 PROCEDURE — G8417 CALC BMI ABV UP PARAM F/U: HCPCS | Performed by: FAMILY MEDICINE

## 2020-09-21 PROCEDURE — G8752 SYS BP LESS 140: HCPCS | Performed by: FAMILY MEDICINE

## 2020-09-21 PROCEDURE — G8399 PT W/DXA RESULTS DOCUMENT: HCPCS | Performed by: FAMILY MEDICINE

## 2020-09-21 PROCEDURE — 1101F PT FALLS ASSESS-DOCD LE1/YR: CPT | Performed by: FAMILY MEDICINE

## 2020-09-21 NOTE — PATIENT INSTRUCTIONS
DASH Diet: Care Instructions Your Care Instructions The DASH diet is an eating plan that can help lower your blood pressure. DASH stands for Dietary Approaches to Stop Hypertension. Hypertension is high blood pressure. The DASH diet focuses on eating foods that are high in calcium, potassium, and magnesium. These nutrients can lower blood pressure. The foods that are highest in these nutrients are fruits, vegetables, low-fat dairy products, nuts, seeds, and legumes. But taking calcium, potassium, and magnesium supplements instead of eating foods that are high in those nutrients does not have the same effect. The DASH diet also includes whole grains, fish, and poultry. The DASH diet is one of several lifestyle changes your doctor may recommend to lower your high blood pressure. Your doctor may also want you to decrease the amount of sodium in your diet. Lowering sodium while following the DASH diet can lower blood pressure even further than just the DASH diet alone. Follow-up care is a key part of your treatment and safety. Be sure to make and go to all appointments, and call your doctor if you are having problems. It's also a good idea to know your test results and keep a list of the medicines you take. How can you care for yourself at home? Following the DASH diet · Eat 4 to 5 servings of fruit each day. A serving is 1 medium-sized piece of fruit, ½ cup chopped or canned fruit, 1/4 cup dried fruit, or 4 ounces (½ cup) of fruit juice. Choose fruit more often than fruit juice. · Eat 4 to 5 servings of vegetables each day. A serving is 1 cup of lettuce or raw leafy vegetables, ½ cup of chopped or cooked vegetables, or 4 ounces (½ cup) of vegetable juice. Choose vegetables more often than vegetable juice. · Get 2 to 3 servings of low-fat and fat-free dairy each day. A serving is 8 ounces of milk, 1 cup of yogurt, or 1 ½ ounces of cheese. · Eat 6 to 8 servings of grains each day. A serving is 1 slice of bread, 1 ounce of dry cereal, or ½ cup of cooked rice, pasta, or cooked cereal. Try to choose whole-grain products as much as possible. · Limit lean meat, poultry, and fish to 2 servings each day. A serving is 3 ounces, about the size of a deck of cards. · Eat 4 to 5 servings of nuts, seeds, and legumes (cooked dried beans, lentils, and split peas) each week. A serving is 1/3 cup of nuts, 2 tablespoons of seeds, or ½ cup of cooked beans or peas. · Limit fats and oils to 2 to 3 servings each day. A serving is 1 teaspoon of vegetable oil or 2 tablespoons of salad dressing. · Limit sweets and added sugars to 5 servings or less a week. A serving is 1 tablespoon jelly or jam, ½ cup sorbet, or 1 cup of lemonade. · Eat less than 2,300 milligrams (mg) of sodium a day. If you limit your sodium to 1,500 mg a day, you can lower your blood pressure even more. Tips for success · Start small. Do not try to make dramatic changes to your diet all at once. You might feel that you are missing out on your favorite foods and then be more likely to not follow the plan. Make small changes, and stick with them. Once those changes become habit, add a few more changes. · Try some of the following: ? Make it a goal to eat a fruit or vegetable at every meal and at snacks. This will make it easy to get the recommended amount of fruits and vegetables each day. ? Try yogurt topped with fruit and nuts for a snack or healthy dessert. ? Add lettuce, tomato, cucumber, and onion to sandwiches. ? Combine a ready-made pizza crust with low-fat mozzarella cheese and lots of vegetable toppings. Try using tomatoes, squash, spinach, broccoli, carrots, cauliflower, and onions. ? Have a variety of cut-up vegetables with a low-fat dip as an appetizer instead of chips and dip. ? Sprinkle sunflower seeds or chopped almonds over salads.  Or try adding chopped walnuts or almonds to cooked vegetables. ? Try some vegetarian meals using beans and peas. Add garbanzo or kidney beans to salads. Make burritos and tacos with mashed molina beans or black beans. Where can you learn more? Go to http://becka-derrick.info/ Enter G696 in the search box to learn more about \"DASH Diet: Care Instructions. \" Current as of: December 16, 2019               Content Version: 12.6 © 9048-2107 [employer]. Care instructions adapted under license by [employer] (which disclaims liability or warranty for this information). If you have questions about a medical condition or this instruction, always ask your healthcare professional. [address] any warranty or liability for your use of this information.

## 2020-09-21 NOTE — ASSESSMENT & PLAN NOTE
Uncontrolled, based on history, physical exam and review of pertinent labs, studies and medications; meds reconciled; continue current treatment plan.   Key Obesity Meds             hydroCHLOROthiazide (MICROZIDE) 12.5 mg capsule (Taking) TAKE 1 CAPSULE EVERY DAY        Lab Results   Component Value Date/Time    Hemoglobin A1c 5.5 12/03/2019 11:44 AM    Glucose 96 12/03/2019 11:44 AM    Cholesterol, total 113 12/03/2019 11:44 AM    HDL Cholesterol 43 12/03/2019 11:44 AM    LDL, calculated 53 12/03/2019 11:44 AM    Triglyceride 83 12/03/2019 11:44 AM    Sodium 141 12/03/2019 11:44 AM    Potassium 4.1 12/03/2019 11:44 AM    ALT (SGPT) 35 12/03/2019 11:44 AM

## 2020-09-21 NOTE — PROGRESS NOTES
Patient Name: Selam Jeffery   MRN: [id number]    Lolis Garcia is a 79 y.o. female who presents with the following:     Reports 2 month hx of more difficulty with talking; often will know what to say but have some difficulty saying the words. Also has some memory confusion. Has not spoken to her psychiatrist about these symptoms. Denies numbness, slurred speech, vision changes. Is unsure if dementia runs in the family. BP Readings from Last 3 Encounters:   09/21/20 120/68   12/03/19 128/72   09/23/19 170/88     Review of Systems   Constitutional: Negative for fever, malaise/fatigue and weight loss. Respiratory: Negative for cough, hemoptysis, shortness of breath and wheezing. Cardiovascular: Negative for chest pain, palpitations, leg swelling and PND. Gastrointestinal: Negative for abdominal pain, constipation, diarrhea, nausea and vomiting. Psychiatric/Behavioral: Positive for memory loss. The patient's medications, allergies, past medical history, surgical history, family history and social history were reviewed and updated where appropriate. Prior to Admission medications    Medication Sig Start Date End Date Taking? Authorizing Provider   simvastatin (ZOCOR) 40 mg tablet TAKE 1 TABLET EVERY NIGHT 8/20/20  Yes Rocio Alcala MD   hydroCHLOROthiazide (MICROZIDE) 12.5 mg capsule TAKE 1 CAPSULE EVERY DAY 8/20/20  Yes Rocio Alcala MD   famotidine (PEPCID) 20 mg tablet TAKE 1 TABLET TWO TIMES DAILY AS NEEDED (GERD). 6/1/20  Yes Marya Villa NP   haloperidol (HALDOL) 1 mg tablet Take 1 mg by mouth nightly. 11/25/19  Yes Provider, Historical   multivitamin (ONE A DAY) tablet Take 1 Tab by mouth daily. Yes Provider, Historical   CALCIUM 600 WITH VITAMIN D3 600 mg(1,500mg) -400 unit chew  6/23/19  Yes Provider, Historical   peg 400-propylene glycol (SYSTANE, PROPYLENE GLYCOL,) 0.4-0.3 % drop Administer 2 Drops to both eyes as needed.    Yes Provider, Historical DOCOSAHEXANOIC ACID/EPA (FISH OIL PO) Take 1 Cap by mouth two (2) times a day. Yes Provider, Historical   aspirin (ASPIRIN) 325 mg tablet Take 1 Tab by mouth daily. 6/13/13  Yes Nimco Hull MD   FLUoxetine (PROZAC) 40 mg capsule Take 40 mg by mouth daily. Yes Provider, Historical   loratadine 10 mg Cap Take 10 mg by mouth daily. Yes Provider, Historical   ECZEMA MOISTURIZING CREAM 1 % crea Apply  to affected area as needed. 6/23/19   Provider, Historical       Allergies   Allergen Reactions    Bee Venom Protein (Honey Bee) Hives    Voltaren [Diclofenac Sodium] Diarrhea         OBJECTIVE    Visit Vitals  /68 (BP 1 Location: Left arm, BP Patient Position: Sitting)   Pulse 68   Temp 98.2 °F (36.8 °C) (Oral)   Resp 16   Ht 5' 2.5\" (1.588 m)   Wt 221 lb 6.4 oz (100.4 kg)   SpO2 97%   BMI 39.85 kg/m²       Physical Exam  Vitals signs and nursing note reviewed. Constitutional:       General: She is not in acute distress. Appearance: Normal appearance. She is not toxic-appearing. HENT:      Head: Normocephalic and atraumatic. Eyes:      Pupils: Pupils are equal, round, and reactive to light. Pulmonary:      Effort: Pulmonary effort is normal.   Musculoskeletal: Normal range of motion. Skin:     General: Skin is warm and dry. Neurological:      Mental Status: She is alert. Mental status is at baseline. Psychiatric:         Mood and Affect: Mood normal.         Behavior: Behavior normal.           ASSESSMENT AND PLAN  Tashi Wilkins is a 79 y.o. female who presents today for:    1. HTN, goal below 140/90  Stable, continue current treatment. 2. Impaired cognition  Pt to discuss with psychiatry if her meds need to be adjusted. No red flags at this time. Consider memory labs/imaging at next visit if persistent. 3. Schizophrenia, unspecified type ([address]    4.  Severe obesity ([address]       Medications Discontinued During This Encounter   Medication Reason    ECZEMA MOISTURIZING CREAM 1 % crea        Follow-up and Dispositions    · Return in about 3 months (around 12/21/2020) for HTN follow up and memory issues, Medicare Wellness Visit (30 min). Treatment risks/benefits/costs/interactions/alternatives discussed with patient. Advised patient to call back or return to office if symptoms worsen/change/persist. If patient cannot reach us or should anything more severe/urgent arise he/she should proceed directly to the nearest emergency department. Discussed expected course/resolution/complications of diagnosis in detail with patient. Patient expressed understanding with the diagnosis and plan. Diagnoses and all orders for this visit:    1. HTN, goal below 140/90    2. Impaired cognition    3. Schizophrenia, unspecified type [facility])  Assessment & Plan: This condition is managed by Specialist.  Key Psychotherapeutic Meds             haloperidol (HALDOL) 1 mg tablet (Taking) Take 1 mg by mouth nightly. FLUoxetine (PROZAC) 40 mg capsule (Taking) Take 40 mg by mouth daily. Other [address] [place] [address]     The patient is on no other behavioral health meds. Lab Results   Component Value Date/Time    Sodium 141 12/03/2019 11:44 AM    Creatinine 1.00 12/03/2019 11:44 AM    WBC 9.8 12/03/2019 11:44 AM    ALT (SGPT) 35 12/03/2019 11:44 AM         4. Severe obesity ([address]  Assessment & Plan:  Uncontrolled, based on history, physical exam and review of pertinent labs, studies and medications; meds reconciled; continue current treatment plan.   Key Obesity Meds             hydroCHLOROthiazide (MICROZIDE) 12.5 mg capsule (Taking) TAKE 1 CAPSULE EVERY DAY        Lab Results   Component Value Date/Time    Hemoglobin A1c 5.5 12/03/2019 11:44 AM    Glucose 96 12/03/2019 11:44 AM    Cholesterol, total 113 12/03/2019 11:44 AM    HDL Cholesterol 43 12/03/2019 11:44 AM    LDL, calculated 53 12/03/2019 11:44 AM    Triglyceride 83 12/03/2019 11:44 AM    Sodium 141 12/03/2019 11:44 AM Potassium 4.1 12/03/2019 11:44 AM    ALT (SGPT) 35 12/03/2019 11:44 AM               Yareli Humphries M.D.

## 2020-09-21 NOTE — PROGRESS NOTES
Chief Complaint   Patient presents with    Hypertension    Cholesterol Problem     1. Have you been to the ER, urgent care clinic since your last visit? Hospitalized since your last visit? No    2. Have you seen or consulted any other health care providers outside of the [address] [place] [address] since your last visit? Include any pap smears or colon screening.  Yes When: Dr Krysta Alfaro opthamology

## 2020-09-21 NOTE — ASSESSMENT & PLAN NOTE
This condition is managed by Specialist.  Key Psychotherapeutic Meds             haloperidol (HALDOL) 1 mg tablet (Taking) Take 1 mg by mouth nightly. FLUoxetine (PROZAC) 40 mg capsule (Taking) Take 40 mg by mouth daily. Other [address] [place] [address]     The patient is on no other behavioral health meds.         Lab Results   Component Value Date/Time    Sodium 141 12/03/2019 11:44 AM    Creatinine 1.00 12/03/2019 11:44 AM    WBC 9.8 12/03/2019 11:44 AM    ALT (SGPT) 35 12/03/2019 11:44 AM

## 2020-11-02 RX ORDER — FAMOTIDINE 20 MG/1
TABLET, FILM COATED ORAL
Qty: 180 TAB | Refills: 1 | Status: SHIPPED | OUTPATIENT
Start: 2020-11-02 | End: 2021-04-01

## 2020-12-07 RX ORDER — SIMVASTATIN 40 MG/1
TABLET, FILM COATED ORAL
Qty: 90 TAB | Refills: 0 | Status: SHIPPED | OUTPATIENT
Start: 2020-12-07 | End: 2021-02-22

## 2020-12-10 ENCOUNTER — OFFICE VISIT (OUTPATIENT)
Dept: FAMILY MEDICINE CLINIC | Age: 70
End: 2020-12-10
Payer: MEDICARE

## 2020-12-10 VITALS
DIASTOLIC BLOOD PRESSURE: 76 MMHG | HEIGHT: 63 IN | TEMPERATURE: 98.6 F | BODY MASS INDEX: 38.41 KG/M2 | HEART RATE: 68 BPM | RESPIRATION RATE: 16 BRPM | OXYGEN SATURATION: 98 % | SYSTOLIC BLOOD PRESSURE: 134 MMHG | WEIGHT: 216.8 LBS

## 2020-12-10 DIAGNOSIS — R41.89 IMPAIRED COGNITION: ICD-10-CM

## 2020-12-10 DIAGNOSIS — Z12.31 VISIT FOR SCREENING MAMMOGRAM: ICD-10-CM

## 2020-12-10 DIAGNOSIS — M25.50 MULTIPLE JOINT PAIN: ICD-10-CM

## 2020-12-10 DIAGNOSIS — Z00.00 ENCOUNTER FOR MEDICARE ANNUAL WELLNESS EXAM: Primary | ICD-10-CM

## 2020-12-10 DIAGNOSIS — M85.80 OSTEOPENIA, UNSPECIFIED LOCATION: ICD-10-CM

## 2020-12-10 DIAGNOSIS — R73.03 PREDIABETES: ICD-10-CM

## 2020-12-10 DIAGNOSIS — E78.5 HYPERLIPIDEMIA, UNSPECIFIED HYPERLIPIDEMIA TYPE: ICD-10-CM

## 2020-12-10 DIAGNOSIS — I10 HTN, GOAL BELOW 140/90: ICD-10-CM

## 2020-12-10 DIAGNOSIS — Z78.0 POSTMENOPAUSAL: ICD-10-CM

## 2020-12-10 DIAGNOSIS — Z13.820 SCREENING FOR OSTEOPOROSIS: ICD-10-CM

## 2020-12-10 LAB
COMMENT, HOLDF: NORMAL
SAMPLES BEING HELD,HOLD: NORMAL

## 2020-12-10 PROCEDURE — G9717 DOC PT DX DEP/BP F/U NT REQ: HCPCS | Performed by: FAMILY MEDICINE

## 2020-12-10 PROCEDURE — 3017F COLORECTAL CA SCREEN DOC REV: CPT | Performed by: FAMILY MEDICINE

## 2020-12-10 PROCEDURE — G8427 DOCREV CUR MEDS BY ELIG CLIN: HCPCS | Performed by: FAMILY MEDICINE

## 2020-12-10 PROCEDURE — G8417 CALC BMI ABV UP PARAM F/U: HCPCS | Performed by: FAMILY MEDICINE

## 2020-12-10 PROCEDURE — G0439 PPPS, SUBSEQ VISIT: HCPCS | Performed by: FAMILY MEDICINE

## 2020-12-10 PROCEDURE — 1101F PT FALLS ASSESS-DOCD LE1/YR: CPT | Performed by: FAMILY MEDICINE

## 2020-12-10 PROCEDURE — G8399 PT W/DXA RESULTS DOCUMENT: HCPCS | Performed by: FAMILY MEDICINE

## 2020-12-10 PROCEDURE — 99214 OFFICE O/P EST MOD 30 MIN: CPT | Performed by: FAMILY MEDICINE

## 2020-12-10 PROCEDURE — G8752 SYS BP LESS 140: HCPCS | Performed by: FAMILY MEDICINE

## 2020-12-10 PROCEDURE — G8536 NO DOC ELDER MAL SCRN: HCPCS | Performed by: FAMILY MEDICINE

## 2020-12-10 PROCEDURE — G8754 DIAS BP LESS 90: HCPCS | Performed by: FAMILY MEDICINE

## 2020-12-10 PROCEDURE — 1090F PRES/ABSN URINE INCON ASSESS: CPT | Performed by: FAMILY MEDICINE

## 2020-12-10 PROCEDURE — G9899 SCRN MAM PERF RSLTS DOC: HCPCS | Performed by: FAMILY MEDICINE

## 2020-12-10 RX ORDER — DICLOFENAC SODIUM 10 MG/G
GEL TOPICAL
Qty: 100 G | Refills: 0 | Status: SHIPPED | OUTPATIENT
Start: 2020-12-10 | End: 2022-01-17

## 2020-12-10 NOTE — PROGRESS NOTES
Chief Complaint   Patient presents with    Annual Wellness Visit     fasting    Referral Request     rheumatology     1. Have you been to the ER, urgent care clinic since your last visit? Hospitalized since your last visit? No    2. Have you seen or consulted any other health care providers outside of the [address] [place] [address] since your last visit? Include any pap smears or colon screening.  Yes Where: [facility]

## 2020-12-10 NOTE — PROGRESS NOTES
This is the Subsequent Medicare Annual Wellness Exam, performed 12 months or more after the Initial AWV or the last Subsequent AWV    I have reviewed the patient's medical history in detail and updated the computerized patient record. Depression Risk Factor Screening:     3 most recent PHQ Screens 6/3/2019   Little interest or pleasure in doing things Nearly every day   Feeling down, depressed, irritable, or hopeless More than half the days   Total Score PHQ 2 5       Alcohol Risk Screen   Do you average more than 1 drink per night or more than 7 drinks a week:  No    On any one occasion in the past three months have you have had more than 3 drinks containing alcohol:  No        Functional Ability and Level of Safety:   Hearing: Hearing is good. Activities of Daily Living: The home contains: no safety equipment. Patient does total self care     Ambulation: with no difficulty     Fall Risk:  Fall Risk Assessment, last 12 mths 12/10/2020   Able to walk? Yes   Fall in past 12 months? No   Fall with injury? -   Number of falls in past 12 months -   Fall Risk Score -     Abuse Screen:  Patient is not abused       Cognitive Screening   Has your family/caregiver stated any concerns about your memory: yes - see note       Assessment/Plan   Education and counseling provided:  Are appropriate based on today's review and evaluation    Diagnoses and all orders for this visit:    1. Encounter for Medicare annual wellness exam    2. HTN, goal below 140/90    3. Hyperlipidemia, unspecified hyperlipidemia type  -     CBC W/O DIFF; Future  -     METABOLIC PANEL, COMPREHENSIVE; Future  -     LIPID PANEL; Future    4. Prediabetes  -     HEMOGLOBIN A1C WITH EAG; Future    5. Osteopenia, unspecified location  -     VITAMIN D, 25 HYDROXY; Future    6. Impaired cognition  -     TSH 3RD GENERATION; Future  -     T4 (THYROXINE); Future  -     VITAMIN B12 & FOLATE; Future    7.  Multiple joint pain  -     REFERRAL TO RHEUMATOLOGY  - diclofenac (Voltaren) 1 % gel; Apply  to affected area four (4) times daily as needed for Pain. 8. Screening for osteoporosis  -     DEXA BONE DENSITY STUDY AXIAL; Future    9. Postmenopausal  -     DEXA BONE DENSITY STUDY AXIAL; Future    10. Visit for screening mammogram  -     [facility] MAMMO BI SCREENING INCL CAD;  Future        Health Maintenance Due     Health Maintenance Due   Topic Date Due    Shingrix Vaccine Age 49> (1 of 2) 03/12/2000    A1C test (Diabetic or Prediabetic)  12/03/2020    Medicare Yearly Exam  12/03/2020    Lipid Screen  12/03/2020    GLAUCOMA SCREENING Q2Y  01/02/2021       Patient Care Team   Patient Care Team:  Kia Wells MD as PCP - General (Family Medicine)  K[facility] Wells [facility]  Kishore Morse MD (Psychiatry)  Aspen Roberto MD (Cardiology)  Nilton Edgar MD as Physician (Gastroenterology)  Anahi Wilson DO (Dermatology)  Ayo Calderon MD as Physician (Ophthalmology)    History     Patient Active Problem List   Diagnosis Code    Insomnia G47.00    Depression F32.9    Esophageal reflux K21.9    Obesity, unspecified E66.9    Allergic rhinitis due to other allergen J30.89    Schizophrenia ([address] F20.9    Anxiety F41.9    Arthritis M19.90    Asthma J45.909    HTN, goal below 140/90 I10    IBS (irritable bowel syndrome) K58.9    Osteopenia M85.80    Prediabetes R73.03    Skin cancer C44.90    History of CVA (cerebrovascular accident) Z80.78    Sun-damaged skin L57.8    PFO (patent foramen ovale) Q21.1    AVM (arteriovenous malformation) brain Q28.2    Hyperlipidemia E78.5    Advance care planning Z71.89    Severe obesity ([address] E66.01    Lipoma of right axilla D17.21     Past Medical History:   Diagnosis Date    Allergic rhinitis due to other allergen     Anxiety     Arthritis     Asthma     in younger years - no inhaler use    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    MRI 2013; stable ischemic changes; hx of CVA    Depression 9/23/2009    Esophageal reflux     HTN, goal below 140/90     Hyperlipidemia 9/23/2009    IBS (irritable bowel syndrome)     Insomnia     Menopause     [id number]    Mixed hyperlipidemia     Obesity, unspecified     Osteopenia     started Fosamax therapy 4/2015    PFO (patent foramen ovale)     reportedly dx'ed with CVA in 2012    Prediabetes     Schizophrenia ([facility]     Skin cancer     squamous cell skin cancer - removed    Stroke ([facility]     no deficits    Sun-damaged skin     TMJ (dislocation of temporomandibular joint)       Past Surgical History:   Procedure Laterality Date    HX ADENOIDECTOMY      HX CATARACT REMOVAL  06/2020    HX HEENT Bilateral     surgery for lazy eye    HX HYSTERECTOMY  1984    endometriosis    HX ORTHOPAEDIC      bone spur removed from right foot    HX TONSILLECTOMY       Current Outpatient Medications   Medication Sig Dispense Refill    diclofenac (Voltaren) 1 % gel Apply  to affected area four (4) times daily as needed for Pain. 100 g 0    famotidine (PEPCID) 20 mg tablet TAKE 1 TABLET TWICE DAILY AS NEEDED FOR REFLUX 180 Tab 1    hydroCHLOROthiazide (MICROZIDE) 12.5 mg capsule TAKE 1 CAPSULE EVERY DAY 90 Cap 1    haloperidol (HALDOL) 1 mg tablet Take 1 mg by mouth every other day.  multivitamin (ONE A DAY) tablet Take 1 Tab by mouth daily.  CALCIUM 600 WITH VITAMIN D3 600 mg(1,500mg) -400 unit chew       DOCOSAHEXANOIC ACID/EPA (FISH OIL PO) Take 1 Cap by mouth two (2) times a day.  aspirin (ASPIRIN) 325 mg tablet Take 1 Tab by mouth daily. 365 Tab 0    FLUoxetine (PROZAC) 40 mg capsule Take 40 mg by mouth daily.  loratadine 10 mg Cap Take 10 mg by mouth daily.       simvastatin (ZOCOR) 40 mg tablet TAKE 1 TABLET EVERY NIGHT 90 Tab 0     Allergies   Allergen Reactions    Bee Venom Protein (Honey Bee) Hives    Voltaren [Diclofenac Sodium] Diarrhea       Family History   Problem Relation Age of Onset    Stroke Mother     Heart Disease Father     Other Sister         benign brain tumor/arthritis    Thyroid Disease Sister         goiter    Diabetes Sister      Social History     Tobacco Use    Smoking status: Never Smoker    Smokeless tobacco: Never Used   Substance Use Topics    Alcohol use:  No

## 2020-12-10 NOTE — PROGRESS NOTES
Patient Name: Author Speak   MRN: [id number]    Jagjit Vee is a 79 y.o. female who presents with the following:     Reports ongoing left knee pain and left ankle pain which is throbbing in nature. Feels like her joint pains tends to migraine from one knee to another. Her sister has rheumatoid arthritis so she wants to see a rheumatologist.   Kodi Showers to have memory issues. Has not talked to her psychiatrist about her medications. Had a normal head CT one year ago. Review of Systems   Constitutional: Negative for fever, malaise/fatigue and weight loss. Respiratory: Negative for cough, hemoptysis, shortness of breath and wheezing. Cardiovascular: Negative for chest pain, palpitations, leg swelling and PND. Gastrointestinal: Negative for abdominal pain, constipation, diarrhea, nausea and vomiting. Musculoskeletal: Positive for joint pain. Psychiatric/Behavioral: Positive for memory loss. The patient's medications, allergies, past medical history, surgical history, family history and social history were reviewed and updated where appropriate. Prior to Admission medications    Medication Sig Start Date End Date Taking? Authorizing Provider   famotidine (PEPCID) 20 mg tablet TAKE 1 TABLET TWICE DAILY AS NEEDED FOR REFLUX 11/2/20  Yes Samson Bullock MD   hydroCHLOROthiazide (MICROZIDE) 12.5 mg capsule TAKE 1 CAPSULE EVERY DAY 8/20/20  Yes Samson Bullock MD   haloperidol (HALDOL) 1 mg tablet Take 1 mg by mouth every other day. 11/25/19  Yes Provider, Historical   multivitamin (ONE A DAY) tablet Take 1 Tab by mouth daily. Yes Provider, Historical   CALCIUM 600 WITH VITAMIN D3 600 mg(1,500mg) -400 unit chew  6/23/19  Yes Provider, Historical   DOCOSAHEXANOIC ACID/EPA (FISH OIL PO) Take 1 Cap by mouth two (2) times a day. Yes Provider, Historical   aspirin (ASPIRIN) 325 mg tablet Take 1 Tab by mouth daily.  6/13/13  Yes Sydnee Adams MD   FLUoxetine (PROZAC) 40 mg capsule Take 40 mg by mouth daily. Yes Provider, Historical   loratadine 10 mg Cap Take 10 mg by mouth daily. Yes Provider, Historical   simvastatin (ZOCOR) 40 mg tablet TAKE 1 TABLET EVERY NIGHT 12/7/20   Maryanne Arriola MD   peg 400-propylene glycol (SYSTANE, PROPYLENE GLYCOL,) 0.4-0.3 % drop Administer 2 Drops to both eyes as needed. Provider, Historical       Allergies   Allergen Reactions    Bee Venom Protein (Honey Bee) Hives    Voltaren [Diclofenac Sodium] Diarrhea         OBJECTIVE    Visit Vitals  /76 (BP 1 Location: Left arm, BP Patient Position: Sitting)   Pulse 68   Temp 98.6 °F (37 °C) (Oral)   Resp 16   Ht 5' 2.5\" (1.588 m)   Wt 216 lb 12.8 oz (98.3 kg)   SpO2 98%   BMI 39.02 kg/m²       Physical Exam  Vitals signs and nursing note reviewed. Constitutional:       General: She is not in acute distress. Appearance: She is not diaphoretic. Eyes:      Conjunctiva/sclera: Conjunctivae normal.      Pupils: Pupils are equal, round, and reactive to light. Cardiovascular:      Rate and Rhythm: Normal rate and regular rhythm. Heart sounds: Normal heart sounds. No murmur. No friction rub. No gallop. Pulmonary:      Effort: Pulmonary effort is normal. No respiratory distress. Breath sounds: Normal breath sounds. No wheezing. Skin:     General: Skin is warm and dry. Neurological:      Mental Status: She is alert. ASSESSMENT AND PLAN  Luzma Anderson is a 79 y.o. female who presents today for:    1. Encounter for Medicare annual wellness exam    2. HTN, goal below 140/90  Stable, continue current treatment. 3. Hyperlipidemia, unspecified hyperlipidemia type  - CBC W/O DIFF; Future  - METABOLIC PANEL, COMPREHENSIVE; Future  - LIPID PANEL; Future  - LIPID PANEL  - METABOLIC PANEL, COMPREHENSIVE  - CBC W/O DIFF    4. Prediabetes  - HEMOGLOBIN A1C WITH EAG; Future  - HEMOGLOBIN A1C WITH EAG    5.  Osteopenia, unspecified location  - VITAMIN D, 25 HYDROXY; Future  - VITAMIN D, 25 HYDROXY    6. Impaired cognition  Pt to discuss with psychiatry re: medications and if they might be affecting her memory. Could also be early MCI or dementia.  - TSH 3RD GENERATION; Future  - T4 (THYROXINE); Future  - VITAMIN B12 & FOLATE; Future  - VITAMIN B12 & FOLATE  - T4 (THYROXINE)  - TSH 3RD GENERATION    7. Multiple joint pain  - REFERRAL TO RHEUMATOLOGY  - diclofenac (Voltaren) 1 % gel; Apply  to affected area four (4) times daily as needed for Pain. Dispense: 100 g; Refill: 0    8. Screening for osteoporosis  - DEXA BONE DENSITY STUDY AXIAL; Future    9. Postmenopausal  - DEXA BONE DENSITY STUDY AXIAL; Future    10. Visit for screening mammogram  - [facility] MAMMO BI SCREENING INCL CAD; Future       Medications Discontinued During This Encounter   Medication Reason    peg 400-propylene glycol (SYSTANE, PROPYLENE GLYCOL,) 0.4-0.3 % drop Therapy Completed       Follow-up and Dispositions    · Return in about 6 months (around 6/10/2021) for HTN follow up. Treatment risks/benefits/costs/interactions/alternatives discussed with patient. Advised patient to call back or return to office if symptoms worsen/change/persist. If patient cannot reach us or should anything more severe/urgent arise he/she should proceed directly to the nearest emergency department. Discussed expected course/resolution/complications of diagnosis in detail with patient. Patient expressed understanding with the diagnosis and plan. Yareli Davis M.D.

## 2020-12-11 LAB
25(OH)D3 SERPL-MCNC: 43.2 NG/ML (ref 30–100)
ALBUMIN SERPL-MCNC: 4.3 G/DL (ref 3.5–5)
ALBUMIN/GLOB SERPL: 1.4 {RATIO} (ref 1.1–2.2)
ALP SERPL-CCNC: 82 U/L (ref 45–117)
ALT SERPL-CCNC: 50 U/L (ref 12–78)
ANION GAP SERPL CALC-SCNC: 6 MMOL/L (ref 5–15)
AST SERPL-CCNC: 34 U/L (ref 15–37)
BILIRUB SERPL-MCNC: 0.7 MG/DL (ref 0.2–1)
BUN SERPL-MCNC: 14 MG/DL (ref 6–20)
BUN/CREAT SERPL: 12 (ref 12–20)
CALCIUM SERPL-MCNC: 9.8 MG/DL (ref 8.5–10.1)
CHLORIDE SERPL-SCNC: 104 MMOL/L (ref 97–108)
CHOLEST SERPL-MCNC: 121 MG/DL
CO2 SERPL-SCNC: 30 MMOL/L (ref 21–32)
CREAT SERPL-MCNC: 1.21 MG/DL (ref 0.55–1.02)
ERYTHROCYTE [DISTWIDTH] IN BLOOD BY AUTOMATED COUNT: 12 % (ref 11.5–14.5)
EST. AVERAGE GLUCOSE BLD GHB EST-MCNC: 111 MG/DL
FOLATE SERPL-MCNC: 33.1 NG/ML (ref 5–21)
GLOBULIN SER CALC-MCNC: 3.1 G/DL (ref 2–4)
GLUCOSE SERPL-MCNC: 112 MG/DL (ref 65–100)
HBA1C MFR BLD: 5.5 % (ref 4–5.6)
HCT VFR BLD AUTO: 44.1 % (ref 35–47)
HDLC SERPL-MCNC: 44 MG/DL
HDLC SERPL: 2.8 {RATIO} (ref 0–5)
HGB BLD-MCNC: 14.4 G/DL (ref 11.5–16)
LDLC SERPL CALC-MCNC: 52.2 MG/DL (ref 0–100)
LIPID PROFILE,FLP: NORMAL
MCH RBC QN AUTO: 30.4 PG (ref 26–34)
MCHC RBC AUTO-ENTMCNC: 32.7 G/DL (ref 30–36.5)
MCV RBC AUTO: 93 FL (ref 80–99)
NRBC # BLD: 0 K/UL (ref 0–0.01)
NRBC BLD-RTO: 0 PER 100 WBC
PLATELET # BLD AUTO: 252 K/UL (ref 150–400)
PMV BLD AUTO: 10.5 FL (ref 8.9–12.9)
POTASSIUM SERPL-SCNC: 4.5 MMOL/L (ref 3.5–5.1)
PROT SERPL-MCNC: 7.4 G/DL (ref 6.4–8.2)
RBC # BLD AUTO: 4.74 M/UL (ref 3.8–5.2)
SODIUM SERPL-SCNC: 140 MMOL/L (ref 136–145)
T4 SERPL-MCNC: 9.7 UG/DL (ref 4.8–13.9)
TRIGL SERPL-MCNC: 124 MG/DL (ref ?–150)
TSH SERPL DL<=0.05 MIU/L-ACNC: 2.57 UIU/ML (ref 0.36–3.74)
VIT B12 SERPL-MCNC: 648 PG/ML (ref 193–986)
VLDLC SERPL CALC-MCNC: 24.8 MG/DL
WBC # BLD AUTO: 9 K/UL (ref 3.6–11)

## 2020-12-14 NOTE — PROGRESS NOTES
Dear Ms. Maude Dee,    I wanted to follow up on your recent test results: Your kidney marker is a little abnormal; please increase the daily amount of water intake to keep you hydrated. Other labs are normal/stable.

## 2021-01-12 ENCOUNTER — HOSPITAL ENCOUNTER (OUTPATIENT)
Dept: MAMMOGRAPHY | Age: 71
Discharge: HOME | End: 2021-01-12
Attending: FAMILY MEDICINE
Payer: MEDICARE

## 2021-01-12 DIAGNOSIS — Z13.820 SCREENING FOR OSTEOPOROSIS: ICD-10-CM

## 2021-01-12 DIAGNOSIS — Z78.0 POSTMENOPAUSAL: ICD-10-CM

## 2021-01-12 DIAGNOSIS — Z12.31 VISIT FOR SCREENING MAMMOGRAM: ICD-10-CM

## 2021-01-12 PROCEDURE — 77080 DXA BONE DENSITY AXIAL: CPT

## 2021-01-12 PROCEDURE — 77067 SCR MAMMO BI INCL CAD: CPT

## 2021-01-14 NOTE — PROGRESS NOTES
Dear Ms. Vishal Ríos,    I wanted to follow up on your recent test results: Your bone density test shows osteopenia, which is the \"pre-osteoporosis\" stage. For osteopenia, it is recommended to do weight-bearing exercises, avoid smoking, and try to get enough daily vitamin D and calcium. People usually need vitamin D supplements whereas most people get enough daily calcium through a well balanced diet. We can repeat this test in 2 to 3 years.

## 2021-01-25 ENCOUNTER — HOSPITAL ENCOUNTER (OUTPATIENT)
Dept: MAMMOGRAPHY | Age: 71
Discharge: HOME | End: 2021-01-25
Attending: FAMILY MEDICINE
Payer: MEDICARE

## 2021-01-25 DIAGNOSIS — R92.8 ABNORMAL MAMMOGRAM OF LEFT BREAST: ICD-10-CM

## 2021-01-25 PROCEDURE — 76642 ULTRASOUND BREAST LIMITED: CPT

## 2021-01-25 PROCEDURE — 77061 BREAST TOMOSYNTHESIS UNI: CPT

## 2021-02-09 ENCOUNTER — HOSPITAL ENCOUNTER (OUTPATIENT)
Dept: PHYSICAL THERAPY | Age: 71
Discharge: HOME | End: 2021-02-09
Payer: MEDICARE

## 2021-02-09 PROCEDURE — 97161 PT EVAL LOW COMPLEX 20 MIN: CPT | Performed by: PHYSICAL THERAPIST

## 2021-02-09 NOTE — PROGRESS NOTES
[facility]  [address] [facility], [address]  Phone: [phone]  Fax: [phone]      PT INITIAL EVALUATION NOTE - [facility] 2-15    Patient Name: Horace Sawyer  Date:2021  : 1950  [x]  Patient  Verified  Payor: Rene Mireles / Plan: [address] [place] [address] HMO / Product Type: Managed Care Medicare /    In time:1105  Out time:1200  Total Treatment Time (min): 55  Total Timed Codes (min): 25  1:1 Treatment Time ([address] only): 25   Visit #: 1     Treatment Area: Pain in right shoulder [M25.511]  Pain in right foot [M79.671]  Left knee pain [M25.562]    SUBJECTIVE  Any medication changes, allergies to medications, adverse drug reactions, diagnosis change, or new procedure performed?: [] No    [x] Yes (see summary sheet for update)    Current symptoms/chief complaint: left knee, right foot, right shoulder pain. Date of onset/injury: 1 year ago. She notes that she did have an episode of PF instability as a teenager. Pt reports she had a hard fall on the left knee about 2 years ago. No recent falls (in past 3 months). She does worry about falling. Aggravated by: walking. Sometimes it feels like it will give way. Stairs and standing although better since starting Meloxicam.      Eased by:  Rest.  Anti-inflammatory medicine from rheumatologist (Meloxicam). She thinks it is not as swollen since starting meloxicam.       Pain:   10/10 max 5/10 min 6/10 now       Location and description of symptoms: L anterior knee pain, she notes it is constant. R foot pain (points to the dorsum of right foot and her great toe). R shoulder pain (points to right supraspinatus region, reports more painful with reaching [place]). Pt reports the knee and foot bother her the most.  Knee pain can be sharp. Diagnostic Tests: [] Lab work [x] X-rays    [] CT [] MRI     [] Other:  Results (per report of the patient): OA left knee.       PMHX: Significant for depression, OA, HTN, TIA 2012 - pt reports still having issues with memory and finding the right words when talking, hyperlipedemia, pre-diabetes, osteopenia. Social/Recreation/Work: not working at this time. Pt reports she hasn't exercised in 2-3 years. She lays in the recliner or bed most of hte day. Pt lives at home with her . \"I've been in bed or the recliner for a couple of years, get up maybe every 2 hours. \"      Prior level of function: Pt notes prior to 1-2 years ago she wasn't having as much pain with walking or stairs. Patient goal(s): \"exercise. \"      Objective:    Posture/Observations: pt is an overweight female, shows good knee extension in standing     Gait: Stiff gait pattern, slow mariann. ROM:  Left knee 3 deg to 120 deg AROM. Right knee 3 deg to 120 deg AROM. Strength:  Hip flexion 4/5, does not keep trunk straight, leaning posterior. Knee ext. and knee flex. 5/5 B/L    Able to perform x 10 heel raises, pt out of breath. Quad set: increased difficulty to perform, rodger glutes. Functional Biomechanical Screen      Bilateral Squat:Asked pt to squat, approx 30 deg knee flexion observed with increased trunk flexion. Palpation:  Tenderness to palpation: TTP inferior to left patella, patellar tendon, superior to left patella and medial to left patella. Joint Mobility:  Increased pain with medial left PF mobs. Optional Tests    Hamstring 90/90 Test: [] Neg    [x] Pos bilaterally. Decreased gastroc/soleus flexibility, limited to 0 deg DF with knee extension. Balance:      Feet together EO and EC: able to hold 30 seconds but shows min. Postural sway, pt reports she feels fearful she will fall. Tandem: limited to 0-1 seconds, pt reports fearful she will fall. Outcome Measure: Patient presents with a FOTO intake summary score of to be printed.       OBJECTIVE    25 min Therapeutic Exercise:  [x] See flow sheet : see HEP sheet. Rationale: increase ROM and increase strength to improve the patients ability to walk, bed her knee. With   [] TE   [] TA   [] neuro   [] other: Patient Education: [x] Review HEP    [] Progressed/Changed HEP based on:   [] positioning   [] body mechanics   [] transfers   [] heat/ice application    [] other:        Pain Level (0-10 scale) post treatment: \"feels a little better. \"        Assessment: See POC    Plan: See POC    Tony Hampton PT, DPT, OCS    2/9/2021    11:11 AM

## 2021-02-09 NOTE — PROGRESS NOTES
[facility] Physical Therapy  [address]  [place], [address]  Phone: [phone]  Fax: [phone]    Plan of Care/Statement of Necessity for Physical Therapy Services  2-15    Patient name: Varinder Charles  : 1950  Provider#: [id number]  Referral source: Juancho Franco MD      Medical/Treatment Diagnosis: Pain in right shoulder [M25.511]  Pain in right foot [M79.671]  Left knee pain [M25.562]     Prior Hospitalization: see medical history     Comorbidities: see eval.  Prior Level of Function: see eval.   Medications: Verified on Patient Summary List  Start of Care: see eval.          Onset Date: see eval.       The Plan of Care and following information is based on the information from the initial evaluation. Assessment/ key information: Pt is a 78 yo female with chronic left knee, right ankle and right shoulder pain. Pt reports her left knee is bothering her most so we focused evaluation on her knee today. Pt admits for past 2 years she has spent most time in her bed or on her recliner. Pt presents with increased pain, decreased strength, decreased balance, increased TTP, decreased flexibility.        Evaluation Complexity History HIGH Complexity :3+ comorbidities / personal factors will impact the outcome/ POC ; Examination MEDIUM Complexity : 3 Standardized tests and measures addressing body structure, function, activity limitation and / or participation in recreation  ;Presentation LOW Complexity : Stable, uncomplicated  ;Clinical Decision Making MEDIUM Complexity : FOTO score of 26-74  Overall Complexity Rating: LOW     Treatment Plan may include any combination of the following: Therapeutic exercise, Therapeutic activities, Neuromuscular re-education, Physical agent/modality, Manual therapy and Patient education   Patient / Family readiness to learn indicated by: asking questions, trying to perform skills and interest  Persons(s) to be included in education: patient (P)  Barriers to Learning/Limitations: None  Patient Goal (s): \"see eval  Patient Self Reported Health Status: good  Rehabilitation Potential: good    Short Term Goals: To be accomplished in 2-3 weeks:   1) Pt independent in HEP from day 1   2) Pt will report she is getting up for every 30-45 minutes versus laying in bed or recliner for 2 hours at a time to improve joint lubrication/nutrition and improve endurance. Long Term Goals: To be accomplished in 4-6 weeks:   1) Pt independent in final HEP. 2) Pt will be able to walk for 5-10 minutes for improving cardiovascular endurance and joint lubrication and nutrition   3) Pt will report 25% reduction in pain with climbing a flight of stairs. Frequency / Duration: Patient to be seen 2 times per week for 4 weeks. Patient/ Caregiver education and instruction: self care and exercises    [x]  Plan of care has been reviewed with PTA    Certification Period:   2/9/2021 - 05 /08/21      Zoya Villavicencio, PT DPT,OCS 2/9/2021 11:07 AM    _______________________________________________________________________    I certify that the above Therapy Services are being furnished while the patient is under my care. I agree with the treatment plan and certify that this therapy is necessary.     [de-identified] Signature:____________________  Date:____________Time: _________    ___

## 2021-02-18 ENCOUNTER — APPOINTMENT (OUTPATIENT)
Dept: PHYSICAL THERAPY | Age: 71
End: 2021-02-18
Payer: MEDICARE

## 2021-02-18 RX ORDER — HYDROCHLOROTHIAZIDE 12.5 MG/1
CAPSULE ORAL
Qty: 90 CAP | Refills: 1 | Status: SHIPPED | OUTPATIENT
Start: 2021-02-18 | End: 2021-07-22

## 2021-02-22 ENCOUNTER — HOSPITAL ENCOUNTER (OUTPATIENT)
Dept: PHYSICAL THERAPY | Age: 71
Discharge: HOME | End: 2021-02-22
Payer: MEDICARE

## 2021-02-22 PROCEDURE — 97110 THERAPEUTIC EXERCISES: CPT | Performed by: PHYSICAL THERAPIST

## 2021-02-22 NOTE — PROGRESS NOTES
PT DAILY TREATMENT NOTE - [facility] 2-15    Patient Name: Caitlin Dougherty  Date:2021  : 1950  [x]  Patient  Verified  Payor: Ketan Nelson / Plan: [address] [place] [address] HMO / Product Type: Managed Care Medicare /    In time:3:00  Out time:3:50  Total Treatment Time (min): 50  Total Timed Codes (min): 40  1:1 Treatment Time ( [address] only): 40   Visit #: 2    Treatment Area: Pain in right shoulder [M25.511]  Pain in right foot [M79.671]  Left knee pain [M25.562]    SUBJECTIVE  Pain Level (0-10 scale), subjective functional status/changes: Pt reports pain is 3/10, her right shoulder and right foot are bothering her more today. Any medication changes, allergies to medications, adverse drug reactions, diagnosis change, or new procedure performed?: [x] No    [] Yes (see summary sheet for update) SEE ABOVE. OBJECTIVE     Right shoulder flexion 110 deg,  deg. Left shoulder flexion 120 deg,  deg. Functional IR R L5 V.L., L L1 vertebral level. Palpation:  TTP right supraspinatus muscle. 10 min Modality:      [x]  Ice     []  Heat       Position/location:   Right shoulder pain. Rationale: decrease pain to improve the patients ability to do functional activities   [x] Skin assessment post-treatment:  [x]intact []redness- no adverse reaction    []redness  adverse reaction:      40 min Therapeutic Exercise:  [x] See flow sheet : assessment of right shoulder. Added scapular retractions, pulleys, rows with yellow t-band. Rationale: increase strength, improve coordination and increase proprioception to improve the patients ability to walk, reach [facility].       - min Manual Therapy:  -   Rationale: decrease pain, increase tissue extensibility and decrease trigger points  to improve the patients ability to -          With   [] TE   [] TA   [] neuro   [] other: Patient Education: [x] Review HEP    [] Progressed/Changed HEP based on:   [] positioning   [] body mechanics   [] transfers   [] heat/ice application    [] other:        Pain Level (0-10 scale) post treatment: 3    ASSESSMENT/Changes in Function:   Pt with increased pain in right shoulder today, pt reports left knee is not bothering her so much. Pt did well with addition of therapeutic exercises today. We suggested trying the recumbent elliptical but pt noted she was fatigued with ther. Ex. Performed thus far. Patient will continue to benefit from skilled PT services to modify and progress therapeutic interventions, address functional mobility deficits, address ROM deficits, address strength deficits, analyze and address soft tissue restrictions, assess and modify postural abnormalities and instruct in home and community integration to attain remaining goals. Progress towards goals / Updated goals:  Goals were not re-assessed today.      PLAN      [x]  Continue plan of care    []  Other:_      Salma Zayas PT DPT, OCS 2/22/2021  2:62 PM       PT License #[id number]

## 2021-03-01 ENCOUNTER — HOSPITAL ENCOUNTER (OUTPATIENT)
Dept: PHYSICAL THERAPY | Age: 71
Discharge: HOME | End: 2021-03-01
Payer: MEDICARE

## 2021-03-01 PROCEDURE — 97110 THERAPEUTIC EXERCISES: CPT | Performed by: PHYSICAL THERAPIST

## 2021-03-01 NOTE — PROGRESS NOTES
PT DAILY TREATMENT NOTE - [facility] 2-15    Patient Name: Goldy Velez  Date:3/1/2021  : 1950  [x]  Patient  Verified  Payor: Ramses Chong / Plan: [address] [place] [address] HMO / Product Type: Managed Care Medicare /    In time: 605  Out time: 1210  Total Treatment Time (min): 55  Total Timed Codes (min): 45  1:1 Treatment Time ( [address] only): 45   Visit #: 3    Treatment Area: Pain in right shoulder [M25.511]  Pain in right foot [M79.671]  Left knee pain [M25.562]    SUBJECTIVE  Pain Level (0-10 scale), subjective functional status/changes: Pt reports pain is 5/10, her shoulder is bothering her more but her knee not so much. Any medication changes, allergies to medications, adverse drug reactions, diagnosis change, or new procedure performed?: [x] No    [] Yes (see summary sheet for update) SEE ABOVE. OBJECTIVE       10 min Modality:      [x]  Ice     []  Heat       Position/location:   Right shoulder pain. Rationale: decrease pain to improve the patients ability to do functional activities   [x] Skin assessment post-treatment:  [x]intact []redness- no adverse reaction    []redness  adverse reaction:      45 min Therapeutic Exercise:  [x] See flow sheet:  Added recumbent elliptical.       Rationale: increase strength, improve coordination and increase proprioception to improve the patients ability to walk, reach [place]. - min Manual Therapy:  -   Rationale: decrease pain, increase tissue extensibility and decrease trigger points  to improve the patients ability to -          With   [] TE   [] TA   [] neuro   [] other: Patient Education: [x] Review HEP    [] Progressed/Changed HEP based on:   [] positioning   [] body mechanics   [] transfers   [] heat/ice application    [] other:        Pain Level (0-10 scale) post treatment: 3-4    ASSESSMENT/Changes in Function:    Pt did well with addition of recumbent elliptical although fatigued.     Patient will continue to benefit from skilled PT services to modify and progress therapeutic interventions, address functional mobility deficits, address ROM deficits, address strength deficits, analyze and address soft tissue restrictions, assess and modify postural abnormalities and instruct in home and community integration to attain remaining goals. Progress towards goals / Updated goals:  Goals were not re-assessed today.      PLAN      [x]  Continue plan of care    []  Other:_      Juvencio Barrios, PT DPT, OCS 3/1/2021  4:72 PM       PT License #[id number]

## 2021-03-08 ENCOUNTER — HOSPITAL ENCOUNTER (OUTPATIENT)
Dept: PHYSICAL THERAPY | Age: 71
Discharge: HOME | End: 2021-03-08
Payer: MEDICARE

## 2021-03-08 PROCEDURE — 97110 THERAPEUTIC EXERCISES: CPT | Performed by: PHYSICAL THERAPIST

## 2021-03-08 NOTE — ANCILLARY DISCHARGE INSTRUCTIONS
PT Discharge Summary / DAILY TREATMENT NOTE - [facility] 2-15 
  
Patient Name: Isidro Finnegan 
Date:3/8/2021 : 1950 [x]? Patient  Verified Payor: Presidiomeliza Hopkins / Plan: [address] [place] [address] HMO / Product Type: Managed Care Medicare / In time: 1120  Out time: 1215 Total Treatment Time (min): 55 Total Timed Codes (min): 55 
1:1 Treatment Time ( only): 55 Visit #: 4 
  Treatment Area: Pain in right shoulder [M25.511] Pain in right foot [M79.671] Left knee pain [M25.562] 
  
SUBJECTIVE Pain Level (0-10 scale), subjective functional status/changes: Pt reports pain is 2/10 pain. Pt reports her pain at it's worst has been 4/10. Pt reports she would like today to be her last day as he co-pay is high. Any medication changes, allergies to medications, adverse drug reactions, diagnosis change, or new procedure performed?: [x]? No    []? Yes (see summary sheet for update) SEE ABOVE.  
  
  
OBJECTIVE  
  
Balance:  Pt able to stand with feet together EO and EC for 30 seconds without loss of balance. Tandem balance: pt able to hold for 30 seconds.   
  
Strength:  Hip flexion 4/5, knee extension 5/5.   
  
- min Modality:   
  [x]? Ice     []? Heat Position/location:   Right shoulder pain. Rationale: decrease pain to improve the patients ability to do functional activities [x]? Skin assessment post-treatment:  [x]? intact []? redness- no adverse reaction 
  []? redness  adverse reaction:  
             
55 min Therapeutic Exercise:  [x]? See flow sheet:     
Progressed ther. Ex. And HEP, see in chart. Discussed/encouraged continued regular exercise.     
Rationale: increase strength, improve coordination and increase proprioception to improve the patients ability to walk, reach [facility].   
  
- min Manual Therapy:  -  
Rationale: decrease pain, increase tissue extensibility and decrease trigger points  to improve the patients ability to - 
 With 
 []? TE 
 []? TA 
 []? neuro 
 []? other: Patient Education: [x]? Review HEP []? Progressed/Changed HEP based on:  
[]? positioning   []? body mechanics   []? transfers   []? heat/ice application   
[]? other:   
  
  
Pain Level (0-10 scale) post treatment: 0-2/10 
  
ASSESSMENT/Changes in Function: Pt with 4 skilled PT visits from 02/09 through 03/08. Pt notes her co-pay is high and prefers today to be her last day. We discussed progression of HEP and gave an updated hand out. We encouraged regular walking or exercise bike (or both) if she is able to purchase one for her home. Pt has met 100% of goals. She also was unable to hold tandem stance at evaluation for > 1 second and was able to hold for 30 seconds today. She notes improved confidence in her balance. Pt is ready for D/C to HEP. Short Term Goals: To be accomplished in 2-3 weeks: 
            4) Pt independent in HEP from day 1 MET 
            2) Pt will report she is getting up for every 30-45 minutes versus laying in bed or recliner for 2 hours at a time to improve joint lubrication/nutrition and improve endurance  MET 
  
Long Term Goals: To be accomplished in 4-6 weeks: 
            1) Pt independent in final HEP. MET  
            2) Pt will be able to walk for 5-10 minutes for improving cardiovascular endurance and joint lubrication and nutrition MET 
            3) Pt will report 25% reduction in pain with climbing a flight of stairs MET (50% improvement) MET 
  
PLAN [x]? D/C to HEP 
  
[]? Other:_   
  
Krista Rivera, PT DPT, OCS         3/8/2021                      3:85 PM       PT License #[id number]

## 2021-03-08 NOTE — PROGRESS NOTES
PT Discharge Summary / DAILY TREATMENT NOTE - [facility] 2-15    Patient Name: Garcia Goins  Date:3/8/2021  : 1950  [x]  Patient  Verified  Payor: Espinoza Mitchell / Plan: [address] [place] [address] HMO / Product Type: Managed Care Medicare /    In time:   Out time: 6571  Total Treatment Time (min): 55  Total Timed Codes (min): 55  1:1 Treatment Time ( [address] only): 55   Visit #: 4    Treatment Area: Pain in right shoulder [M25.511]  Pain in right foot [M79.671]  Left knee pain [M25.562]    SUBJECTIVE  Pain Level (0-10 scale), subjective functional status/changes: Pt reports pain is 2/10 pain. Pt reports her pain at it's worst has been 4/10. Pt reports she would like today to be her last day as he co-pay is high. Any medication changes, allergies to medications, adverse drug reactions, diagnosis change, or new procedure performed?: [x] No    [] Yes (see summary sheet for update) SEE ABOVE. OBJECTIVE     Balance:  Pt able to stand with feet together EO and EC for 30 seconds without loss of balance. Tandem balance: pt able to hold for 30 seconds. Strength:  Hip flexion 4/5, knee extension 5/5.      - min Modality:      [x]  Ice     []  Heat       Position/location:   Right shoulder pain. Rationale: decrease pain to improve the patients ability to do functional activities   [x] Skin assessment post-treatment:  [x]intact []redness- no adverse reaction    []redness  adverse reaction:      55 min Therapeutic Exercise:  [x] See flow sheet:      Progressed ther. Ex. And HEP, see in chart. Discussed/encouraged continued regular exercise. Rationale: increase strength, improve coordination and increase proprioception to improve the patients ability to walk, reach [facility].       - min Manual Therapy:  -   Rationale: decrease pain, increase tissue extensibility and decrease trigger points  to improve the patients ability to -          With   [] TE   [] TA   [] neuro   [] other: Patient Education: [x] Review HEP    [] Progressed/Changed HEP based on:   [] positioning   [] body mechanics   [] transfers   [] heat/ice application    [] other:        Pain Level (0-10 scale) post treatment: 0-2/10    ASSESSMENT/Changes in Function:   Pt with 4 skilled PT visits from 02/09 through 03/08. Pt notes her co-pay is high and prefers today to be her last day. We discussed progression of HEP and gave an updated hand out. We encouraged regular walking or exercise bike (or both) if she is able to purchase one for her home. Pt has met 100% of goals. She also was unable to hold tandem stance at evaluation for > 1 second and was able to hold for 30 seconds today. She notes improved confidence in her balance. Pt is ready for D/C to HEP. Short Term Goals: To be accomplished in 2-3 weeks:              1) Pt independent in HEP from day 1 MET              2) Pt will report she is getting up for every 30-45 minutes versus laying in bed or recliner for 2 hours at a time to improve joint lubrication/nutrition and improve endurance  MET     Long Term Goals: To be accomplished in 4-6 weeks:              1) Pt independent in final HEP.   MET               2) Pt will be able to walk for 5-10 minutes for improving cardiovascular endurance and joint lubrication and nutrition MET              3) Pt will report 25% reduction in pain with climbing a flight of stairs MET (50% improvement) MET    PLAN      [x]  D/C to HEP    []  Other:_      Bren Richardson, PT DPT, OCS 3/8/2021  5:90 PM       PT License #[id number]

## 2021-04-01 RX ORDER — FAMOTIDINE 20 MG/1
TABLET, FILM COATED ORAL
Qty: 180 TAB | Refills: 1 | Status: SHIPPED | OUTPATIENT
Start: 2021-04-01 | End: 2021-08-26

## 2021-07-08 ENCOUNTER — TELEPHONE (OUTPATIENT)
Dept: FAMILY MEDICINE CLINIC | Age: 71
End: 2021-07-08

## 2021-07-08 DIAGNOSIS — N63.20 LEFT BREAST MASS: Primary | ICD-10-CM

## 2021-07-08 NOTE — TELEPHONE ENCOUNTER
Rudell Councilman ([place] [address]) [phone]     Called stating she needs a referral for a L Breast Ultra Sound (6 month follow-up). Please send through [facility] or can be faxed to [phone]. If there are any questions please call Rudell Councilman.

## 2021-07-12 ENCOUNTER — TRANSCRIBE ORDER (OUTPATIENT)
Dept: SCHEDULING | Age: 71
End: 2021-07-12

## 2021-07-12 DIAGNOSIS — R92.8 MAMMOGRAM ABNORMAL: Primary | ICD-10-CM

## 2021-07-13 ENCOUNTER — HOSPITAL ENCOUNTER (OUTPATIENT)
Dept: MAMMOGRAPHY | Age: 71
Discharge: HOME | End: 2021-07-13
Attending: FAMILY MEDICINE
Payer: MEDICARE

## 2021-07-13 DIAGNOSIS — R92.8 MAMMOGRAM ABNORMAL: ICD-10-CM

## 2021-07-13 DIAGNOSIS — N63.20 LEFT BREAST MASS: ICD-10-CM

## 2021-07-13 PROCEDURE — 76642 ULTRASOUND BREAST LIMITED: CPT

## 2021-07-22 RX ORDER — HYDROCHLOROTHIAZIDE 12.5 MG/1
CAPSULE ORAL
Qty: 90 CAPSULE | Refills: 1 | Status: SHIPPED | OUTPATIENT
Start: 2021-07-22 | End: 2021-12-15

## 2021-08-26 RX ORDER — FAMOTIDINE 20 MG/1
TABLET, FILM COATED ORAL
Qty: 180 TABLET | Refills: 1 | Status: SHIPPED | OUTPATIENT
Start: 2021-08-26 | End: 2022-01-20

## 2021-11-19 ENCOUNTER — TELEPHONE (OUTPATIENT)
Dept: FAMILY MEDICINE CLINIC | Age: 71
End: 2021-11-19

## 2021-12-01 ENCOUNTER — TELEPHONE (OUTPATIENT)
Dept: FAMILY MEDICINE CLINIC | Age: 71
End: 2021-12-01

## 2021-12-01 NOTE — TELEPHONE ENCOUNTER
----- Message from Meliza Carlisle sent at 12/1/2021  2:02 PM EST -----  Subject: Referral Request    QUESTIONS   Reason for referral request? pt needs referral for rheumatologist - Dr. Fili Melchor, Ofc # [id number] referral for [facility], Dr. Abner Garcia fax# [phone]   Has the physician seen you for this condition before? No   Preferred Specialist (if applicable)? Do you already have an appointment scheduled? No  Additional Information for Provider?   ---------------------------------------------------------------------------  --------------  CALL BACK INFO  What is the best way for the office to contact you? OK to leave message on   voicemail  Preferred Call Back Phone Number?  [phone]

## 2021-12-02 ENCOUNTER — TELEPHONE (OUTPATIENT)
Dept: FAMILY MEDICINE CLINIC | Age: 71
End: 2021-12-02

## 2021-12-02 NOTE — TELEPHONE ENCOUNTER
Appt: 12.3.21 to     Need new ref order placed in CC so that I can route/fax over her information    Thanks  Reshma Torres  Referral Specialist  [facility]  P: [phone]   F: [phone]

## 2021-12-02 NOTE — TELEPHONE ENCOUNTER
That's fine patient has [place] therefore NO [place] is Required for her insurance   It's just nice to have a new updated referral order on file in [address] [place] [address] for referral tracking purposes    Close Enc    Thanks  June CAMPA   Referral Specialist  [facility]

## 2021-12-02 NOTE — TELEPHONE ENCOUNTER
Patient is scheduled for appt on 12.15.21 with Romel Garcia with [facility]    Please place new Derm order into CC for referral tracking purposes    Thanks  Constantino Monge  Referral Specialist  [facility]  P: [phone]   F: [phone]

## 2021-12-15 RX ORDER — HYDROCHLOROTHIAZIDE 12.5 MG/1
CAPSULE ORAL
Qty: 90 CAPSULE | Refills: 1 | Status: SHIPPED | OUTPATIENT
Start: 2021-12-15 | End: 2022-05-11 | Stop reason: SDUPTHER

## 2021-12-15 RX ORDER — SIMVASTATIN 40 MG/1
TABLET, FILM COATED ORAL
Qty: 90 TABLET | Refills: 3 | Status: SHIPPED | OUTPATIENT
Start: 2021-12-15 | End: 2022-10-06 | Stop reason: SDUPTHER

## 2021-12-15 NOTE — TELEPHONE ENCOUNTER
PCP: Rich Leon MD    Last appt: Visit date not found  Future Appointments   Date Time Provider Diandra Ott   1/10/2022 11:00 AM Nura Aguilar MD [facility] BS AMB   1/13/2022 10:45 AM [address] [place] [address]       Requested Prescriptions     Pending Prescriptions Disp Refills    hydroCHLOROthiazide (MICROZIDE) 12.5 mg capsule [Pharmacy Med Name: HYDROCHLOROTHIAZIDE 12.5 MG Capsule] 90 Capsule 1     Sig: TAKE 1 CAPSULE EVERY DAY    simvastatin (ZOCOR) 40 mg tablet [Pharmacy Med Name: SIMVASTATIN 40 MG Tablet] 90 Tablet 3     Sig: TAKE 1 TABLET EVERY NIGHT         Prior labs and Blood pressures:  BP Readings from Last 3 Encounters:   12/10/20 134/76   09/21/20 120/68   12/03/19 128/72     Lab Results   Component Value Date/Time    Sodium 140 12/10/2020 10:52 AM    Potassium 4.5 12/10/2020 10:52 AM    Chloride 104 12/10/2020 10:52 AM    CO2 30 12/10/2020 10:52 AM    Anion gap 6 12/10/2020 10:52 AM    Glucose 112 (H) 12/10/2020 10:52 AM    BUN 14 12/10/2020 10:52 AM    Creatinine 1.21 (H) 12/10/2020 10:52 AM    BUN/Creatinine ratio 12 12/10/2020 10:52 AM    GFR est AA 53 (L) 12/10/2020 10:52 AM    GFR est non-AA 44 (L) 12/10/2020 10:52 AM    Calcium 9.8 12/10/2020 10:52 AM     Lab Results   Component Value Date/Time    Hemoglobin A1c 5.5 12/10/2020 10:52 AM     Lab Results   Component Value Date/Time    Cholesterol, total 121 12/10/2020 10:52 AM    HDL Cholesterol 44 12/10/2020 10:52 AM    LDL, calculated 52.2 12/10/2020 10:52 AM    VLDL, calculated 24.8 12/10/2020 10:52 AM    Triglyceride 124 12/10/2020 10:52 AM    CHOL/HDL Ratio 2.8 12/10/2020 10:52 AM     Lab Results   Component Value Date/Time    Vitamin D 25-Hydroxy 43.2 12/10/2020 10:52 AM       Lab Results   Component Value Date/Time    TSH 2.57 12/10/2020 10:52 AM

## 2022-01-10 ENCOUNTER — OFFICE VISIT (OUTPATIENT)
Dept: FAMILY MEDICINE CLINIC | Age: 72
End: 2022-01-10
Payer: MEDICARE

## 2022-01-10 VITALS
WEIGHT: 214 LBS | SYSTOLIC BLOOD PRESSURE: 170 MMHG | DIASTOLIC BLOOD PRESSURE: 80 MMHG | HEART RATE: 76 BPM | HEIGHT: 63 IN | BODY MASS INDEX: 37.92 KG/M2 | OXYGEN SATURATION: 96 % | TEMPERATURE: 98.1 F | RESPIRATION RATE: 16 BRPM

## 2022-01-10 DIAGNOSIS — R10.13 EPIGASTRIC PAIN: Primary | ICD-10-CM

## 2022-01-10 DIAGNOSIS — R10.9 RIGHT FLANK PAIN: ICD-10-CM

## 2022-01-10 DIAGNOSIS — K21.9 GASTROESOPHAGEAL REFLUX DISEASE, UNSPECIFIED WHETHER ESOPHAGITIS PRESENT: ICD-10-CM

## 2022-01-10 DIAGNOSIS — E78.5 HYPERLIPIDEMIA, UNSPECIFIED HYPERLIPIDEMIA TYPE: ICD-10-CM

## 2022-01-10 DIAGNOSIS — I10 HTN, GOAL BELOW 140/90: ICD-10-CM

## 2022-01-10 DIAGNOSIS — E66.01 SEVERE OBESITY (BMI 35.0-35.9 WITH COMORBIDITY) (HCC): ICD-10-CM

## 2022-01-10 DIAGNOSIS — R10.10 UPPER ABDOMINAL PAIN: ICD-10-CM

## 2022-01-10 DIAGNOSIS — F20.9 SCHIZOPHRENIA, UNSPECIFIED TYPE (HCC): ICD-10-CM

## 2022-01-10 DIAGNOSIS — N18.30 STAGE 3 CHRONIC KIDNEY DISEASE, UNSPECIFIED WHETHER STAGE 3A OR 3B CKD (HCC): ICD-10-CM

## 2022-01-10 DIAGNOSIS — R73.03 PREDIABETES: ICD-10-CM

## 2022-01-10 DIAGNOSIS — R93.5 ABNORMAL ULTRASOUND OF ABDOMEN: ICD-10-CM

## 2022-01-10 LAB
BILIRUB UR QL STRIP: NORMAL
GLUCOSE UR-MCNC: NEGATIVE MG/DL
KETONES P FAST UR STRIP-MCNC: NORMAL MG/DL
PH UR STRIP: 5.5 [PH] (ref 4.6–8)
PROT UR QL STRIP: NORMAL
SP GR UR STRIP: 1.03 (ref 1–1.03)
UA UROBILINOGEN AMB POC: NORMAL (ref 0.2–1)
URINALYSIS CLARITY POC: CLEAR
URINALYSIS COLOR POC: YELLOW
URINE BLOOD POC: NEGATIVE
URINE LEUKOCYTES POC: NEGATIVE
URINE NITRITES POC: NEGATIVE

## 2022-01-10 PROCEDURE — 1101F PT FALLS ASSESS-DOCD LE1/YR: CPT | Performed by: FAMILY MEDICINE

## 2022-01-10 PROCEDURE — G9717 DOC PT DX DEP/BP F/U NT REQ: HCPCS | Performed by: FAMILY MEDICINE

## 2022-01-10 PROCEDURE — G8427 DOCREV CUR MEDS BY ELIG CLIN: HCPCS | Performed by: FAMILY MEDICINE

## 2022-01-10 PROCEDURE — G8417 CALC BMI ABV UP PARAM F/U: HCPCS | Performed by: FAMILY MEDICINE

## 2022-01-10 PROCEDURE — 81003 URINALYSIS AUTO W/O SCOPE: CPT | Performed by: FAMILY MEDICINE

## 2022-01-10 PROCEDURE — G9899 SCRN MAM PERF RSLTS DOC: HCPCS | Performed by: FAMILY MEDICINE

## 2022-01-10 PROCEDURE — 3017F COLORECTAL CA SCREEN DOC REV: CPT | Performed by: FAMILY MEDICINE

## 2022-01-10 PROCEDURE — G8536 NO DOC ELDER MAL SCRN: HCPCS | Performed by: FAMILY MEDICINE

## 2022-01-10 PROCEDURE — G8753 SYS BP > OR = 140: HCPCS | Performed by: FAMILY MEDICINE

## 2022-01-10 PROCEDURE — 99214 OFFICE O/P EST MOD 30 MIN: CPT | Performed by: FAMILY MEDICINE

## 2022-01-10 PROCEDURE — G8399 PT W/DXA RESULTS DOCUMENT: HCPCS | Performed by: FAMILY MEDICINE

## 2022-01-10 PROCEDURE — G8754 DIAS BP LESS 90: HCPCS | Performed by: FAMILY MEDICINE

## 2022-01-10 PROCEDURE — 1090F PRES/ABSN URINE INCON ASSESS: CPT | Performed by: FAMILY MEDICINE

## 2022-01-10 RX ORDER — PIROXICAM 20 MG/1
20 CAPSULE ORAL DAILY
COMMUNITY
End: 2022-01-10

## 2022-01-10 RX ORDER — MELOXICAM 15 MG/1
TABLET ORAL
COMMUNITY
Start: 2021-10-25 | End: 2022-01-17

## 2022-01-10 NOTE — PROGRESS NOTES
Carly Monte  70 y.o. female  1950  [address] [address]  [id number]     [facility] PRACTICE       Encounter Date: 1/10/2022           Established Patient Visit Note: Dorothea Bertrand MD    Reason for Appointment:  Chief Complaint   Patient presents with    Abdominal Pain         History of Present Illness:  History provided by patient    Carly Monte is a 70 y.o. female who presents to clinic today for:       Patient originally scheduled MWE,but this was changed to problem visit     Abdominal Pain  Duration: 1 week of abdominal pain, but she reports having vomiting since in 2019  Abdominal Pain, Right Flank Pain, Nausea, indigestion  Location: epigastric, RUQ, and LUQ  AS: denies f/c, vomiting  Assoicated Factors: She takes Meloxicam daily for chronic arthritis through Dr. Susan Delgado (Rheumatology). She takes Aspirin 325mg daily for hx of stroke. She reports last BM as yesterday. She reports as a little darker than usual.   Last colonoscopy was 2015 with DR. Silvina Bain. Specialist: She has apt with GI on 1/26/21    CKD: Last GFR was 44 on 12/10/20      Schizophrenia: followed by psychiatry (Dr. Sergio Galvan)    Review of Systems  See HPI      Allergies: Bee venom protein (honey bee) and Voltaren [diclofenac sodium]    Medications:     Current Outpatient Medications:     hydroCHLOROthiazide (MICROZIDE) 12.5 mg capsule, TAKE 1 CAPSULE EVERY DAY, Disp: 90 Capsule, Rfl: 1    simvastatin (ZOCOR) 40 mg tablet, TAKE 1 TABLET EVERY NIGHT, Disp: 90 Tablet, Rfl: 3    famotidine (PEPCID) 20 mg tablet, TAKE 1 TABLET TWICE DAILY AS NEEDED  FOR  REFLUX, Disp: 180 Tablet, Rfl: 1    haloperidol (HALDOL) 1 mg tablet, Take 1 mg by mouth every other day., Disp: , Rfl:     multivitamin (ONE A DAY) tablet, Take 1 Tab by mouth daily. , Disp: , Rfl:     CALCIUM 600 WITH VITAMIN D3 600 mg(1,500mg) -400 unit chew, , Disp: , Rfl:     DOCOSAHEXANOIC ACID/EPA (FISH OIL PO), Take 1 Cap by mouth two (2) times a day., Disp: , Rfl:     aspirin (ASPIRIN) 325 mg tablet, Take 1 Tab by mouth daily. , Disp: 365 Tab, Rfl: 0    FLUoxetine (PROZAC) 40 mg capsule, Take 40 mg by mouth daily. , Disp: , Rfl:     meloxicam (MOBIC) 15 mg tablet, , Disp: , Rfl:     diclofenac (Voltaren) 1 % gel, Apply  to affected area four (4) times daily as needed for Pain., Disp: 100 g, Rfl: 0    loratadine 10 mg Cap, Take 10 mg by mouth daily. 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 Past Surgical History: she has a past surgical history that includes hx orthopaedic; hx tonsillectomy; hx adenoidectomy; hx heent (Bilateral); hx hysterectomy (1984); and hx cataract removal (06/2020). Family Medical History: family history includes Diabetes in her sister; Heart Disease in her father; Other in her sister; Stroke in her mother; Thyroid Disease in her sister. Social History: she reports that she has never smoked.  She has never used smokeless tobacco. She reports that she does not drink alcohol and does not use drugs.        Objective:   Visit Vitals  BP (!) 170/80   Pulse 76   Temp 98.1 °F (36.7 °C)   Resp 16   Ht 5' 2.5\" (1.588 m)   Wt 214 lb (97.1 kg)   SpO2 96%   BMI 38.52 kg/m²     Wt Readings from Last 3 Encounters:   01/10/22 214 lb (97.1 kg)   12/10/20 216 lb 12.8 oz (98.3 kg)   09/21/20 221 lb 6.4 oz (100.4 kg)       Physical Exam  Vitals and nursing note reviewed. Constitutional:       General: She is not in acute distress. Appearance: Normal appearance. HENT:      Head: Normocephalic and atraumatic. Nose: Nose normal.      Mouth/Throat:      Mouth: Mucous membranes are moist.   Eyes:      Extraocular Movements: Extraocular movements intact. Conjunctiva/sclera: Conjunctivae normal.      Pupils: Pupils are equal, round, and reactive to light. Cardiovascular:      Rate and Rhythm: Normal rate and regular rhythm. Pulses: Normal pulses. Heart sounds: Normal heart sounds. No murmur heard. No friction rub. No gallop. Pulmonary:      Effort: Pulmonary effort is normal. No respiratory distress. Breath sounds: Normal breath sounds. No wheezing, rhonchi or rales. Musculoskeletal:         General: Normal range of motion. Cervical back: Normal range of motion and neck supple. No rigidity. No muscular tenderness. Lymphadenopathy:      Cervical: No cervical adenopathy. Skin:     General: Skin is warm. Coloration: Skin is not jaundiced. Neurological:      General: No focal deficit present. Mental Status: She is alert. Mental status is at baseline. Cranial Nerves: Cranial nerves are intact. Motor: Motor function is intact. Coordination: Coordination is intact. Psychiatric:         Mood and Affect: Mood normal.         Behavior: Behavior normal.         Thought Content: Thought content normal.         Judgment: Judgment normal.         Assessment & Plan:      ICD-10-CM ICD-9-CM    1.  Epigastric pain  R10.13 789.06 AMB POC URINALYSIS DIP STICK AUTO W/O MICRO      LIPASE      US ABD COMP      LIPASE   2. Gastroesophageal reflux disease, unspecified whether esophagitis present  K21.9 530.81    3. Severe obesity (BMI 35.0-35.9 with comorbidity) ([facility])  E66.01 278.01     Z68.35 V85.35    4. Schizophrenia, unspecified type ([facility]  F20.9 295.90    5. Upper abdominal pain  R10.10 789.09 US ABD COMP   6. Right flank pain  R10.9 789.09 URINALYSIS W/ RFLX MICROSCOPIC      CULTURE, URINE      CULTURE, URINE      URINALYSIS W/ RFLX MICROSCOPIC   7. Prediabetes  R73.03 790.29 HEMOGLOBIN A1C WITH EAG      HEMOGLOBIN A1C WITH EAG   8. HTN, goal below 140/90  I10 401.9 CBC W/O DIFF      TSH 3RD GENERATION      TSH 3RD GENERATION      CBC W/O DIFF   9. Hyperlipidemia, unspecified hyperlipidemia type  E78.5 272.4 LIPID PANEL      METABOLIC PANEL, COMPREHENSIVE      METABOLIC PANEL, COMPREHENSIVE      LIPID PANEL   10. Abnormal ultrasound of abdomen  R93.5 793.6    11. Stage 3 chronic kidney disease, unspecified whether stage 3a or 3b CKD ([facility])  N18.30 585.3      · Epigastric Pain: New problem, uncontrolled. Check labs and imaging as above and RTC 4 weeks. Discussed red flag symptoms and reasons to call or go to ED  · HTN: BP elevated, but patient reports that she did not take her BP medicine today. Discussed continued monitoring at home and calling if persistently elevated.  All other conditions listed above: chronic and stable. Will continue current treatment regimen. Will check labs as reflected above. Discussed following up with specialist as scheduled. Discussed recommendations for diet and exercise. I have discussed the diagnosis with the patient and the intended plan as seen in the above orders. The patient has received an after-visit summary along with patient information handout. I have discussed medication side effects and warnings with the patient as well.     Disposition  Follow-up and Dispositions    · Return in about 4 weeks (around 2/7/2022) for abdominal pain.           Odessa Hood MD

## 2022-01-10 NOTE — PROGRESS NOTES
Chief Complaint   Patient presents with    Complete Physical        1. \"Have you been to the ER, urgent care clinic since your last visit? Hospitalized since your last visit? \" No    2. \"Have you seen or consulted any other health care providers outside of the [address] [place] [address] since your last visit? \" No     3. For patients aged 39-70: Has the patient had a colonoscopy? Yes, HM satisfied with blue hyperlink     If the patient is female:    4. For patients aged 41-77: Has the patient had a mammogram within the past 2 years? Yes, HM satisfied with blue hyperlink    5. For patients aged 21-30: Has the patient had a pap smear?  NA based on age or sex    3 most recent PHQ Screens 6/3/2019   Little interest or pleasure in doing things Nearly every day   Feeling down, depressed, irritable, or hopeless More than half the days   Total Score PHQ 2 5

## 2022-01-11 ENCOUNTER — HOSPITAL ENCOUNTER (OUTPATIENT)
Dept: ULTRASOUND IMAGING | Age: 72
Discharge: HOME | End: 2022-01-11
Attending: FAMILY MEDICINE
Payer: MEDICARE

## 2022-01-11 DIAGNOSIS — R10.10 UPPER ABDOMINAL PAIN: ICD-10-CM

## 2022-01-11 DIAGNOSIS — R10.13 EPIGASTRIC PAIN: ICD-10-CM

## 2022-01-11 LAB
ALBUMIN SERPL-MCNC: 4.4 G/DL (ref 3.5–5)
ALBUMIN/GLOB SERPL: 1.3 {RATIO} (ref 1.1–2.2)
ALP SERPL-CCNC: 78 U/L (ref 45–117)
ALT SERPL-CCNC: 41 U/L (ref 12–78)
ANION GAP SERPL CALC-SCNC: 7 MMOL/L (ref 5–15)
APPEARANCE UR: ABNORMAL
AST SERPL-CCNC: 30 U/L (ref 15–37)
BACTERIA URNS QL MICRO: NEGATIVE /HPF
BILIRUB SERPL-MCNC: 1 MG/DL (ref 0.2–1)
BILIRUB UR QL CFM: NEGATIVE
BUN SERPL-MCNC: 21 MG/DL (ref 6–20)
BUN/CREAT SERPL: 17 (ref 12–20)
CALCIUM SERPL-MCNC: 10.3 MG/DL (ref 8.5–10.1)
CAOX CRY URNS QL MICRO: ABNORMAL
CHLORIDE SERPL-SCNC: 103 MMOL/L (ref 97–108)
CHOLEST SERPL-MCNC: 125 MG/DL
CO2 SERPL-SCNC: 28 MMOL/L (ref 21–32)
COLOR UR: ABNORMAL
CREAT SERPL-MCNC: 1.24 MG/DL (ref 0.55–1.02)
EPITH CASTS URNS QL MICRO: ABNORMAL /LPF
ERYTHROCYTE [DISTWIDTH] IN BLOOD BY AUTOMATED COUNT: 12.2 % (ref 11.5–14.5)
EST. AVERAGE GLUCOSE BLD GHB EST-MCNC: 117 MG/DL
GLOBULIN SER CALC-MCNC: 3.3 G/DL (ref 2–4)
GLUCOSE SERPL-MCNC: 112 MG/DL (ref 65–100)
GLUCOSE UR STRIP.AUTO-MCNC: NEGATIVE MG/DL
HBA1C MFR BLD: 5.7 % (ref 4–5.6)
HCT VFR BLD AUTO: 45.2 % (ref 35–47)
HDLC SERPL-MCNC: 46 MG/DL
HDLC SERPL: 2.7 {RATIO} (ref 0–5)
HGB BLD-MCNC: 14.8 G/DL (ref 11.5–16)
HGB UR QL STRIP: NEGATIVE
HYALINE CASTS URNS QL MICRO: ABNORMAL /LPF (ref 0–5)
KETONES UR QL STRIP.AUTO: ABNORMAL MG/DL
LDLC SERPL CALC-MCNC: 52.4 MG/DL (ref 0–100)
LEUKOCYTE ESTERASE UR QL STRIP.AUTO: ABNORMAL
LIPASE SERPL-CCNC: 73 U/L (ref 73–393)
MCH RBC QN AUTO: 30.4 PG (ref 26–34)
MCHC RBC AUTO-ENTMCNC: 32.7 G/DL (ref 30–36.5)
MCV RBC AUTO: 92.8 FL (ref 80–99)
NITRITE UR QL STRIP.AUTO: NEGATIVE
NRBC # BLD: 0 K/UL (ref 0–0.01)
NRBC BLD-RTO: 0 PER 100 WBC
PH UR STRIP: 5 [PH] (ref 5–8)
PLATELET # BLD AUTO: 275 K/UL (ref 150–400)
PMV BLD AUTO: 11.1 FL (ref 8.9–12.9)
POTASSIUM SERPL-SCNC: 4.5 MMOL/L (ref 3.5–5.1)
PROT SERPL-MCNC: 7.7 G/DL (ref 6.4–8.2)
PROT UR STRIP-MCNC: ABNORMAL MG/DL
RBC # BLD AUTO: 4.87 M/UL (ref 3.8–5.2)
RBC #/AREA URNS HPF: ABNORMAL /HPF (ref 0–5)
SODIUM SERPL-SCNC: 138 MMOL/L (ref 136–145)
SP GR UR REFRACTOMETRY: 1.03 (ref 1–1.03)
TRIGL SERPL-MCNC: 133 MG/DL (ref ?–150)
TSH SERPL DL<=0.05 MIU/L-ACNC: 2.54 UIU/ML (ref 0.36–3.74)
UROBILINOGEN UR QL STRIP.AUTO: 0.2 EU/DL (ref 0.2–1)
VLDLC SERPL CALC-MCNC: 26.6 MG/DL
WBC # BLD AUTO: 10.6 K/UL (ref 3.6–11)
WBC URNS QL MICRO: ABNORMAL /HPF (ref 0–4)

## 2022-01-11 PROCEDURE — 76700 US EXAM ABDOM COMPLETE: CPT

## 2022-01-12 ENCOUNTER — TRANSCRIBE ORDER (OUTPATIENT)
Dept: SCHEDULING | Age: 72
End: 2022-01-12

## 2022-01-12 DIAGNOSIS — R93.2 ABNORMAL LIVER ULTRASOUND: Primary | ICD-10-CM

## 2022-01-12 DIAGNOSIS — R10.13 EPIGASTRIC PAIN: ICD-10-CM

## 2022-01-12 DIAGNOSIS — Z12.31 VISIT FOR SCREENING MAMMOGRAM: Primary | ICD-10-CM

## 2022-01-12 LAB
BACTERIA SPEC CULT: NORMAL
CC UR VC: NORMAL
SERVICE CMNT-IMP: NORMAL

## 2022-01-12 NOTE — PROGRESS NOTES
Called patient and discussed results of ultrasound. Will provide referral to hepatology. Given persistent pain, will obtain CT abd/pelv.      Flory Reyes MD

## 2022-01-13 NOTE — PROGRESS NOTES
Mixed urogenital jodie on urine culture. UA with few Ca Oxalate crystals. GFR 43, but stable from last check. Remainder of labs stable. Will await CT.

## 2022-01-20 RX ORDER — FAMOTIDINE 20 MG/1
TABLET, FILM COATED ORAL
Qty: 180 TABLET | Refills: 1 | Status: SHIPPED | OUTPATIENT
Start: 2022-01-20 | End: 2022-06-16 | Stop reason: SDUPTHER

## 2022-01-20 NOTE — TELEPHONE ENCOUNTER
Requested Prescriptions     Pending Prescriptions Disp Refills    famotidine (PEPCID) 20 mg tablet [Pharmacy Med Name: FAMOTIDINE 20 MG Tablet] 180 Tablet 1     Sig: TAKE 1 TABLET TWICE DAILY AS NEEDED  FOR  REFLUX

## 2022-01-21 ENCOUNTER — TELEPHONE (OUTPATIENT)
Dept: FAMILY MEDICINE CLINIC | Age: 72
End: 2022-01-21

## 2022-01-21 NOTE — TELEPHONE ENCOUNTER
Records request for recent labs, office visit, procedures, or radiology to [facility]. Patient is seeing Dr. Selina Rosas. Records faxed and confirmed to [phone].

## 2022-01-28 ENCOUNTER — HOSPITAL ENCOUNTER (OUTPATIENT)
Dept: MAMMOGRAPHY | Age: 72
Discharge: HOME | End: 2022-01-28
Attending: FAMILY MEDICINE
Payer: MEDICARE

## 2022-01-28 DIAGNOSIS — Z12.31 VISIT FOR SCREENING MAMMOGRAM: ICD-10-CM

## 2022-01-28 PROCEDURE — 77067 SCR MAMMO BI INCL CAD: CPT

## 2022-02-04 ENCOUNTER — HOSPITAL ENCOUNTER (OUTPATIENT)
Dept: CT IMAGING | Age: 72
Discharge: HOME | End: 2022-02-04
Attending: FAMILY MEDICINE
Payer: MEDICARE

## 2022-02-04 DIAGNOSIS — R10.13 EPIGASTRIC PAIN: ICD-10-CM

## 2022-02-04 DIAGNOSIS — R93.2 ABNORMAL LIVER ULTRASOUND: ICD-10-CM

## 2022-02-04 PROCEDURE — 74177 CT ABD & PELVIS W/CONTRAST: CPT

## 2022-02-04 PROCEDURE — 74011000636 HC RX REV CODE- 636: Performed by: RADIOLOGY

## 2022-02-04 RX ADMIN — IOPAMIDOL 100 ML: 755 INJECTION, SOLUTION INTRAVENOUS at 14:28

## 2022-02-04 RX ADMIN — IOHEXOL 50 ML: 240 INJECTION, SOLUTION INTRATHECAL; INTRAVASCULAR; INTRAVENOUS; ORAL at 14:28

## 2022-02-07 ENCOUNTER — TELEPHONE (OUTPATIENT)
Dept: FAMILY MEDICINE CLINIC | Age: 72
End: 2022-02-07

## 2022-02-07 DIAGNOSIS — E66.01 SEVERE OBESITY (HCC): ICD-10-CM

## 2022-02-07 DIAGNOSIS — K76.0 HEPATIC STEATOSIS: Primary | ICD-10-CM

## 2022-02-07 NOTE — TELEPHONE ENCOUNTER
Patient's  called stated wife had CT of abdominal and pelvic. Dr. Aimee Norris want report of CT faxed #[phone].     Best call back #[phone]

## 2022-02-07 NOTE — PROGRESS NOTES
Called patient and discussed imaging results showing hepatic steatosis and diverticulosis. She reports seeing GI on 1/26/22 with plan to evaluate CT. She reports that she is no longer taking mobic. Advised patient to schedule follow up visit with GI to discuss further evaluation.      Brit Meléndez MD

## 2022-02-07 NOTE — TELEPHONE ENCOUNTER
Patient has requested referral to dietician for fatty liver and obesity. An order for this has been placed. Please provide patient with referral information to schedule appointment.

## 2022-03-19 PROBLEM — D17.21 LIPOMA OF RIGHT AXILLA: Status: ACTIVE | Noted: 2019-09-10

## 2022-03-19 PROBLEM — Z71.89 ADVANCE CARE PLANNING: Status: ACTIVE | Noted: 2017-07-10

## 2022-03-20 PROBLEM — E66.01 SEVERE OBESITY (HCC): Status: ACTIVE | Noted: 2018-12-03

## 2022-03-24 ENCOUNTER — HOSPITAL ENCOUNTER (OUTPATIENT)
Dept: PREADMISSION TESTING | Age: 72
Discharge: HOME | End: 2022-03-24
Attending: INTERNAL MEDICINE
Payer: MEDICARE

## 2022-03-24 ENCOUNTER — TRANSCRIBE ORDER (OUTPATIENT)
Dept: REGISTRATION | Age: 72
End: 2022-03-24

## 2022-03-24 DIAGNOSIS — U07.1 COVID-19: ICD-10-CM

## 2022-03-24 DIAGNOSIS — U07.1 COVID-19: Primary | ICD-10-CM

## 2022-03-24 PROCEDURE — U0005 INFEC AGEN DETEC AMPLI PROBE: HCPCS

## 2022-03-26 LAB
SARS-COV-2, XPLCVT: NOT DETECTED
SOURCE, COVRS: NORMAL

## 2022-03-28 ENCOUNTER — ANESTHESIA EVENT (OUTPATIENT)
Dept: ENDOSCOPY | Age: 72
End: 2022-03-28
Payer: MEDICARE

## 2022-03-28 ENCOUNTER — ANESTHESIA (OUTPATIENT)
Dept: ENDOSCOPY | Age: 72
End: 2022-03-28
Payer: MEDICARE

## 2022-03-28 ENCOUNTER — HOSPITAL ENCOUNTER (OUTPATIENT)
Age: 72
Setting detail: OUTPATIENT SURGERY
Discharge: HOME | End: 2022-03-28
Attending: INTERNAL MEDICINE | Admitting: INTERNAL MEDICINE
Payer: MEDICARE

## 2022-03-28 VITALS
SYSTOLIC BLOOD PRESSURE: 125 MMHG | RESPIRATION RATE: 15 BRPM | DIASTOLIC BLOOD PRESSURE: 60 MMHG | WEIGHT: 213 LBS | HEART RATE: 65 BPM | BODY MASS INDEX: 39.2 KG/M2 | TEMPERATURE: 99.8 F | HEIGHT: 62 IN | OXYGEN SATURATION: 97 %

## 2022-03-28 PROCEDURE — 76060000032 HC ANESTHESIA 0.5 TO 1 HR: Performed by: INTERNAL MEDICINE

## 2022-03-28 PROCEDURE — 88305 TISSUE EXAM BY PATHOLOGIST: CPT

## 2022-03-28 PROCEDURE — 74011250636 HC RX REV CODE- 250/636: Performed by: NURSE PRACTITIONER

## 2022-03-28 PROCEDURE — 76040000007: Performed by: INTERNAL MEDICINE

## 2022-03-28 PROCEDURE — 74011000250 HC RX REV CODE- 250: Performed by: NURSE PRACTITIONER

## 2022-03-28 PROCEDURE — 2709999900 HC NON-CHARGEABLE SUPPLY: Performed by: INTERNAL MEDICINE

## 2022-03-28 PROCEDURE — 77030021593 HC FCPS BIOP ENDOSC BSC -A: Performed by: INTERNAL MEDICINE

## 2022-03-28 RX ORDER — SODIUM CHLORIDE, SODIUM LACTATE, POTASSIUM CHLORIDE, CALCIUM CHLORIDE 600; 310; 30; 20 MG/100ML; MG/100ML; MG/100ML; MG/100ML
INJECTION, SOLUTION INTRAVENOUS
Status: DISCONTINUED | OUTPATIENT
Start: 2022-03-28 | End: 2022-03-28 | Stop reason: HOSPADM

## 2022-03-28 RX ORDER — FLUMAZENIL 0.1 MG/ML
0.2 INJECTION INTRAVENOUS
Status: DISCONTINUED | OUTPATIENT
Start: 2022-03-28 | End: 2022-03-28 | Stop reason: HOSPADM

## 2022-03-28 RX ORDER — PROPOFOL 10 MG/ML
INJECTION, EMULSION INTRAVENOUS AS NEEDED
Status: DISCONTINUED | OUTPATIENT
Start: 2022-03-28 | End: 2022-03-28 | Stop reason: HOSPADM

## 2022-03-28 RX ORDER — MIDAZOLAM HYDROCHLORIDE 1 MG/ML
.25-5 INJECTION, SOLUTION INTRAMUSCULAR; INTRAVENOUS
Status: DISCONTINUED | OUTPATIENT
Start: 2022-03-28 | End: 2022-03-28 | Stop reason: HOSPADM

## 2022-03-28 RX ORDER — FENTANYL CITRATE 50 UG/ML
25-200 INJECTION, SOLUTION INTRAMUSCULAR; INTRAVENOUS
Status: DISCONTINUED | OUTPATIENT
Start: 2022-03-28 | End: 2022-03-28 | Stop reason: HOSPADM

## 2022-03-28 RX ORDER — SODIUM CHLORIDE 0.9 % (FLUSH) 0.9 %
5-40 SYRINGE (ML) INJECTION EVERY 8 HOURS
Status: DISCONTINUED | OUTPATIENT
Start: 2022-03-28 | End: 2022-03-28 | Stop reason: HOSPADM

## 2022-03-28 RX ORDER — MORPHINE SULFATE 2 MG/ML
2 INJECTION, SOLUTION INTRAMUSCULAR; INTRAVENOUS
Status: DISCONTINUED | OUTPATIENT
Start: 2022-03-28 | End: 2022-03-28 | Stop reason: HOSPADM

## 2022-03-28 RX ORDER — SODIUM CHLORIDE 0.9 % (FLUSH) 0.9 %
5-40 SYRINGE (ML) INJECTION AS NEEDED
Status: DISCONTINUED | OUTPATIENT
Start: 2022-03-28 | End: 2022-03-28 | Stop reason: HOSPADM

## 2022-03-28 RX ORDER — DIPHENHYDRAMINE HYDROCHLORIDE 50 MG/ML
12.5 INJECTION, SOLUTION INTRAMUSCULAR; INTRAVENOUS AS NEEDED
Status: DISCONTINUED | OUTPATIENT
Start: 2022-03-28 | End: 2022-03-28 | Stop reason: HOSPADM

## 2022-03-28 RX ORDER — FENTANYL CITRATE 50 UG/ML
25 INJECTION, SOLUTION INTRAMUSCULAR; INTRAVENOUS
Status: DISCONTINUED | OUTPATIENT
Start: 2022-03-28 | End: 2022-03-28 | Stop reason: HOSPADM

## 2022-03-28 RX ORDER — SODIUM CHLORIDE, SODIUM LACTATE, POTASSIUM CHLORIDE, CALCIUM CHLORIDE 600; 310; 30; 20 MG/100ML; MG/100ML; MG/100ML; MG/100ML
25 INJECTION, SOLUTION INTRAVENOUS CONTINUOUS
Status: DISCONTINUED | OUTPATIENT
Start: 2022-03-28 | End: 2022-03-28 | Stop reason: HOSPADM

## 2022-03-28 RX ORDER — SODIUM CHLORIDE 9 MG/ML
50 INJECTION, SOLUTION INTRAVENOUS CONTINUOUS
Status: DISCONTINUED | OUTPATIENT
Start: 2022-03-28 | End: 2022-03-28 | Stop reason: HOSPADM

## 2022-03-28 RX ORDER — LIDOCAINE HYDROCHLORIDE 20 MG/ML
INJECTION, SOLUTION EPIDURAL; INFILTRATION; INTRACAUDAL; PERINEURAL AS NEEDED
Status: DISCONTINUED | OUTPATIENT
Start: 2022-03-28 | End: 2022-03-28 | Stop reason: HOSPADM

## 2022-03-28 RX ORDER — NALOXONE HYDROCHLORIDE 0.4 MG/ML
0.4 INJECTION, SOLUTION INTRAMUSCULAR; INTRAVENOUS; SUBCUTANEOUS
Status: DISCONTINUED | OUTPATIENT
Start: 2022-03-28 | End: 2022-03-28 | Stop reason: HOSPADM

## 2022-03-28 RX ORDER — LIDOCAINE HYDROCHLORIDE 10 MG/ML
0.1 INJECTION, SOLUTION EPIDURAL; INFILTRATION; INTRACAUDAL; PERINEURAL AS NEEDED
Status: DISCONTINUED | OUTPATIENT
Start: 2022-03-28 | End: 2022-03-28 | Stop reason: HOSPADM

## 2022-03-28 RX ORDER — DEXTROMETHORPHAN/PSEUDOEPHED 2.5-7.5/.8
1.2 DROPS ORAL
Status: DISCONTINUED | OUTPATIENT
Start: 2022-03-28 | End: 2022-03-28 | Stop reason: HOSPADM

## 2022-03-28 RX ORDER — ATROPINE SULFATE 0.1 MG/ML
0.5 INJECTION INTRAVENOUS
Status: DISCONTINUED | OUTPATIENT
Start: 2022-03-28 | End: 2022-03-28 | Stop reason: HOSPADM

## 2022-03-28 RX ORDER — OXYCODONE AND ACETAMINOPHEN 5; 325 MG/1; MG/1
1 TABLET ORAL
Status: DISCONTINUED | OUTPATIENT
Start: 2022-03-28 | End: 2022-03-28 | Stop reason: HOSPADM

## 2022-03-28 RX ORDER — HYDROMORPHONE HYDROCHLORIDE 1 MG/ML
.2-.5 INJECTION, SOLUTION INTRAMUSCULAR; INTRAVENOUS; SUBCUTANEOUS ONCE
Status: DISCONTINUED | OUTPATIENT
Start: 2022-03-28 | End: 2022-03-28 | Stop reason: HOSPADM

## 2022-03-28 RX ORDER — EPINEPHRINE 0.1 MG/ML
1 INJECTION INTRACARDIAC; INTRAVENOUS
Status: DISCONTINUED | OUTPATIENT
Start: 2022-03-28 | End: 2022-03-28 | Stop reason: HOSPADM

## 2022-03-28 RX ADMIN — PROPOFOL 50 MG: 10 INJECTION, EMULSION INTRAVENOUS at 15:55

## 2022-03-28 RX ADMIN — PROPOFOL 50 MG: 10 INJECTION, EMULSION INTRAVENOUS at 16:04

## 2022-03-28 RX ADMIN — PROPOFOL 50 MG: 10 INJECTION, EMULSION INTRAVENOUS at 16:01

## 2022-03-28 RX ADMIN — PROPOFOL 50 MG: 10 INJECTION, EMULSION INTRAVENOUS at 15:49

## 2022-03-28 RX ADMIN — PROPOFOL 50 MG: 10 INJECTION, EMULSION INTRAVENOUS at 15:47

## 2022-03-28 RX ADMIN — PROPOFOL 50 MG: 10 INJECTION, EMULSION INTRAVENOUS at 15:53

## 2022-03-28 RX ADMIN — PROPOFOL 50 MG: 10 INJECTION, EMULSION INTRAVENOUS at 15:51

## 2022-03-28 RX ADMIN — PROPOFOL 100 MG: 10 INJECTION, EMULSION INTRAVENOUS at 15:45

## 2022-03-28 RX ADMIN — PROPOFOL 50 MG: 10 INJECTION, EMULSION INTRAVENOUS at 15:57

## 2022-03-28 RX ADMIN — LIDOCAINE HYDROCHLORIDE 100 MG: 20 INJECTION, SOLUTION EPIDURAL; INFILTRATION; INTRACAUDAL; PERINEURAL at 15:45

## 2022-03-28 RX ADMIN — SODIUM CHLORIDE, SODIUM LACTATE, POTASSIUM CHLORIDE, AND CALCIUM CHLORIDE: 600; 310; 30; 20 INJECTION, SOLUTION INTRAVENOUS at 15:40

## 2022-03-28 NOTE — PROCEDURES
[address] [place]   Ruby Chong, [address] [place] [address]      Colonoscopy Operative Report    Traci Walton  [id number]  1950      Procedure Type:   Colonoscopy --diagnostic     Indications:    Abdominal pain, generalized         Pre-operative Diagnosis: see indication above    Post-operative Diagnosis:  See findings below    :  Mercy Harvey. Diane Arboleda MD    Staff: Endoscopy Chelsea Davis: Anirudh Hagen  Endoscopy RN-1: Jacinta Chavez RN     Referring Provider: Chelsea Hoffman MD      Sedation:  MAC anesthesia Propofol      Procedure Details:  After informed consent was obtained with all risks and benefits of procedure explained and preoperative exam completed, the patient was taken to the endoscopy suite and placed in the left lateral decubitus position. Upon sequential sedation as per above, a digital rectal exam was performed demonstrating internal hemorrhoids. The Olympus pediatric videocolonoscope  was inserted in the rectum and carefully advanced to the cecum, which was identified by the ileocecal valve and appendiceal orifice. The cecum was identified by the ileocecal valve and appendiceal orifice. The quality of preparation was adequate. The colonoscope was slowly withdrawn with careful evaluation between folds. Retroflexion in the rectum was completed . Findings:   Rectum: normal  Sigmoid: mild diverticulosis  Descending Colon: mild diverticulosis  Transverse Colon: normal  Ascending Colon: normal  Cecum: normal  Terminal Ileum: not intubated      Specimen Removed:  none    Complications: None. EBL:  None. Impression:    As above    Recommendations:  1. High fiber diet education  2. Follow up in 2-3 months  3. Repeat colonoscopy in 3-5 years     Signed By: Mercy Harvey.  Diane Arboleda MD     3/28/2022  4:15 PM

## 2022-03-28 NOTE — ROUTINE PROCESS
Reviewed discharge instructions with patient. Gave patient a hard copy. IV dc'd without any issues,  Pt has no complaints of pain. Nurse to assist patient with changing into personal clothes.   Vital signs within normal limits

## 2022-03-28 NOTE — ANESTHESIA POSTPROCEDURE EVALUATION
Procedure(s):  COLONOSCOPY  ESOPHAGOGASTRODUODENOSCOPY (EGD)  ESOPHAGOGASTRODUODENAL (EGD) BIOPSY. MAC    Anesthesia Post Evaluation      Multimodal analgesia: multimodal analgesia used between 6 hours prior to anesthesia start to PACU discharge  Patient location during evaluation: PACU  Patient participation: complete - patient participated  Level of consciousness: awake and alert  Pain management: adequate  Airway patency: patent  Anesthetic complications: no  Cardiovascular status: acceptable  Respiratory status: acceptable  Hydration status: acceptable  Comments: Seen, no complaints   Post anesthesia nausea and vomiting:  none      INITIAL Post-op Vital signs:   Vitals Value Taken Time   /65 03/28/22 1620   Temp     Pulse 71 03/28/22 1624   Resp 18 03/28/22 1624   SpO2 98 % 03/28/22 1624   Vitals shown include unvalidated device data.

## 2022-03-28 NOTE — DISCHARGE INSTRUCTIONS
[address]  [address] [facility], [address] [place] [address]    EGD DISCHARGE INSTRUCTIONS    Glenroy Parson  [id number]  1950    Discomfort:  Sore throat- throat lozenges or warm salt water gargle  redness at IV site- apply warm compress to area; if redness or soreness persist- contact your physician  Gaseous discomfort- walking, belching will help relieve any discomfort  You may not operate a vehicle for 12 hours  You may not engage in an occupation involving machinery or appliances for rest of today  You may not drink alcoholic beverages for at least 12 hours  Avoid making any critical decisions for at least 24 hour  DIET  You may resume your regular diet - however -  remember your colon is empty and a heavy meal will produce gas. Avoid these foods:  vegetables, fried / greasy foods, carbonated drinks    ACTIVITY  You may resume your normal daily activities   Spend the remainder of the day resting -  avoid any strenuous activity. CALL M.D. ANY SIGN OF   Increasing pain, nausea, vomiting  Abdominal distension (swelling)  New increased bleeding (oral or rectal)  Fever (chills)  Pain in chest area  Bloody discharge from nose or mouth  Shortness of breath    Follow-up Instructions:   Call Dr. Javier Mckeon for any questions or problems. Telephone # [phone]    ENDOSCOPY FINDINGS:   Your endoscopy showed mild gastritis for which biopsies were taken. We will contact you about the pathology results. Your colonoscopy showed no polyps today. Please maintain a high fiber diet. Signed By: Davina Mendoza MD     3/28/2022  4:12 PM         Learning About Coronavirus (COVID-19)  Coronavirus (COVID-19): Overview  What is coronavirus ([id number])? The coronavirus disease (COVID-19) is caused by a virus. It is an illness that was first found in [facility], in December 2019. It has since spread worldwide. The virus can cause fever, cough, and trouble breathing.  In severe cases, it can cause pneumonia and make it hard to breathe without help. It can cause death. Coronaviruses are a large group of viruses. They cause the common cold. They also cause more serious illnesses like Middle East respiratory syndrome (MERS) and severe acute respiratory syndrome (SARS). COVID-19 is caused by a novel coronavirus. That means it's a new type that has not been seen in people before. This virus spreads person-to-person through droplets from coughing and sneezing. It can also spread when you are close to someone who is infected. And it can spread when you touch something that has the virus on it, such as a doorknob or a tabletop. What can you do to protect yourself from coronavirus (COVID-19)? The best way to protect yourself from getting sick is to:  · Avoid areas where there is an outbreak. · Avoid contact with people who may be infected. · Wash your hands often with soap or alcohol-based hand sanitizers. · Avoid crowds and try to stay at least 6 feet away from other people. · Wash your hands often, especially after you cough or sneeze. Use soap and water, and scrub for at least 20 seconds. If soap and water aren't available, use an alcohol-based hand . · Avoid touching your mouth, nose, and eyes. What can you do to avoid spreading the virus to others? To help avoid spreading the virus to others:  · Cover your mouth with a tissue when you cough or sneeze. Then throw the tissue in the trash. · Use a disinfectant to clean things that you touch often. · Stay home if you are sick or have been exposed to the virus. Don't go to school, work, or public areas. And don't use public transportation. · If you are sick:  ? Leave your home only if you need to get medical care. But call the doctor's office first so they know you're coming. And wear a face mask, if you have one.  ? If you have a face mask, wear it whenever you're around other people.  It can help stop the spread of the virus when you cough or sneeze. ? Clean and disinfect your home every day. Use household  and disinfectant wipes or sprays. Take special care to clean things that you grab with your hands. These include doorknobs, remote controls, phones, and handles on your refrigerator and microwave. And don't forget countertops, tabletops, bathrooms, and computer keyboards. When to call for help  Call 911 anytime you think you may need emergency care. For example, call if:  · You have severe trouble breathing. (You can't talk at all.)  · You have constant chest pain or pressure. · You are severely dizzy or lightheaded. · You are confused or can't think clearly. · Your face and lips have a blue color. · You pass out (lose consciousness) or are very hard to wake up. Call your doctor now if you develop symptoms such as:  · Shortness of breath. · Fever. · Cough. If you need to get care, call ahead to the doctor's office for instructions before you go. Make sure you wear a face mask, if you have one, to prevent exposing other people to the virus. Where can you get the latest information? The following health organizations are tracking and studying this virus. Their websites contain the most up-to-date information. Bettinarosemarie Valente also learn what to do if you think you may have been exposed to the virus. · U.S. Centers for Disease Control and Prevention (CDC): The CDC provides updated news about the disease and travel advice. The website also tells you how to prevent the spread of infection. www.cdc.gov  · World Health Organization [facility]): WHO offers information about the virus outbreaks. WHO also has travel advice. www.who.int  Current as of: April 1, 2020               Content Version: 12.4  © 4680-3370 [employer], [employer]orporated.    Care instructions adapted under license by your healthcare professional. If you have questions about a medical condition or this instruction, always ask your healthcare professional. [address] any warranty or liability for your use of this information.

## 2022-03-28 NOTE — PROCEDURES
[address]  [address] (EGD) Procedure Note    Melvin Medrano  1950  [id number]      Procedure: Endoscopic Gastroduodenoscopy with biopsy    Indication:  GERD     Pre-operative Diagnosis: see indication above    Post-operative Diagnosis: see findings below    : Farida Feng. Serge Boeck, MD    Staff: Endoscopy Radha Herder: Casi Nathan  Endoscopy RN-1: Eleazar Stack RN     Referring Provider:  Carolina French MD      Anesthesia/Sedation:  MAC anesthesia Propofol        Procedure Details     After informed consent was obtained for the procedure, with all risks and benefits of procedure explained the patient was taken to the endoscopy suite and placed in the left lateral decubitus position. Following sequential administration of sedation as per above, the endoscope was inserted into the mouth and advanced under direct vision to second portion of the duodenum. A careful inspection was made as the gastroscope was withdrawn, including a retroflexed view of the proximal stomach; findings and interventions are described below. Findings:   Esophagus:normal  Stomach: mild patchy antral erythema - cold biopsies taken  Duodenum: normal to second portion      Therapies:  As above    Specimens: 1. gastric         EBL: None      Complications:   None; patient tolerated the procedure well. Impression:    As above    Recommendations:  1. Follow up surgical pathology  2. Proceed to colonoscopy    Signed By: Farida Feng.  Serge Boeck, MD     3/28/2022  4:13 PM

## 2022-03-28 NOTE — ANESTHESIA PREPROCEDURE EVALUATION
Relevant Problems   RESPIRATORY SYSTEM   (+) Asthma      NEUROLOGY   (+) Depression   (+) Schizophrenia ([facility] [address]      CARDIOVASCULAR   (+) HTN, goal below 140/90      GASTROINTESTINAL   (+) Esophageal reflux      ENDOCRINE   (+) Arthritis   (+) Obesity, unspecified   (+) Severe obesity (HCC)      PERSONAL HX & FAMILY HX OF CANCER   (+) Skin cancer       Anesthetic History   No history of anesthetic complications            Review of Systems / Medical History  Patient summary reviewed, nursing notes reviewed and pertinent labs reviewed    Pulmonary            Asthma : well controlled       Neuro/Psych       CVA  TIA and psychiatric history     Cardiovascular    Hypertension              Exercise tolerance: >4 METS     GI/Hepatic/Renal  Within defined limits              Endo/Other        Morbid obesity and arthritis     Other Findings              Physical Exam    Airway  Mallampati: II  TM Distance: 4 - 6 cm  Neck ROM: normal range of motion   Mouth opening: Normal     Cardiovascular  Regular rate and rhythm,  S1 and S2 normal,  no murmur, click, rub, or gallop  Rhythm: regular  Rate: normal         Dental  No notable dental hx       Pulmonary  Breath sounds clear to auscultation               Abdominal  GI exam deferred       Other Findings            Anesthetic Plan    ASA: 3  Anesthesia type: MAC          Induction: Intravenous  Anesthetic plan and risks discussed with: Patient

## 2022-03-28 NOTE — H&P
[address], [address] [place] [address]      History and Physical       NAME:  Shaheen Lopez   :   1950   MRN:   [id number]             History of Present Illness:  Patient is a 67 y.o. who is seen for GERD and generalized abdominal pain. PMH:  Past Medical History:   Diagnosis Date    Allergic rhinitis due to other allergen     Anxiety     Arthritis     Asthma     in younger years - no inhaler use    AVM (arteriovenous malformation) brain 2012    MRI 2013; stable ischemic changes; hx of CVA    Depression 2009    Esophageal reflux     HTN, goal below 140/90     Hyperlipidemia 2009    IBS (irritable bowel syndrome)     Insomnia     Menopause     [id number]    Mixed hyperlipidemia     Obesity, unspecified     Osteopenia     started Fosamax therapy 2015    PFO (patent foramen ovale)     reportedly dx'ed with CVA in     Prediabetes     Schizophrenia ([facility] [address]     Skin cancer     squamous cell skin cancer - removed    Stroke ([facility] [address]     no deficits    Sun-damaged skin     TMJ (dislocation of temporomandibular joint)        PSH:  Past Surgical History:   Procedure Laterality Date    HX ADENOIDECTOMY      HX CATARACT REMOVAL  2020    HX HEENT Bilateral     surgery for lazy eye    HX HYSTERECTOMY      endometriosis    HX ORTHOPAEDIC      bone spur removed from right foot    HX TONSILLECTOMY         Allergies: Allergies   Allergen Reactions    Bee Venom Protein (Honey Bee) Hives    Voltaren [Diclofenac Sodium] Diarrhea       Home Medications:  Prior to Admission Medications   Prescriptions Last Dose Informant Patient Reported? Taking? CALCIUM 600 WITH VITAMIN D3 600 mg(1,500mg) -400 unit chew 3/27/2022 at Unknown time  Yes Yes   DOCOSAHEXANOIC ACID/EPA (FISH OIL PO) 3/27/2022 at Unknown time  Yes Yes   Sig: Take 1 Cap by mouth two (2) times a day.    FLUoxetine (PROZAC) 40 mg capsule 3/27/2022 at Unknown time  Yes Yes   Sig: Take 40 mg by mouth daily. aspirin (ASPIRIN) 325 mg tablet 3/27/2022 at Unknown time  No Yes   Sig: Take 1 Tab by mouth daily. famotidine (PEPCID) 20 mg tablet 3/27/2022 at Unknown time  No Yes   Sig: TAKE 1 TABLET TWICE DAILY AS NEEDED  FOR  REFLUX   haloperidol (HALDOL) 1 mg tablet 3/21/2022 at Unknown time  Yes Yes   Sig: Take 1 mg by mouth every other day. hydroCHLOROthiazide (MICROZIDE) 12.5 mg capsule 3/27/2022 at Unknown time  No Yes   Sig: TAKE 1 CAPSULE EVERY DAY   loratadine 10 mg Cap Not Taking at Unknown time  Yes No   Sig: Take 10 mg by mouth daily. Patient not taking: Reported on 1/10/2022   multivitamin (ONE A DAY) tablet 2/28/2022 at Unknown time  Yes Yes   Sig: Take 1 Tab by mouth daily.    simvastatin (ZOCOR) 40 mg tablet 3/27/2022 at Unknown time  No Yes   Sig: TAKE 1 TABLET EVERY NIGHT      Facility-Administered Medications: None       Hospital Medications:  Current Facility-Administered Medications   Medication Dose Route Frequency    lactated Ringers infusion  25 mL/hr IntraVENous CONTINUOUS    sodium chloride (NS) flush 5-40 mL  5-40 mL IntraVENous Q8H    sodium chloride (NS) flush 5-40 mL  5-40 mL IntraVENous PRN    lidocaine (PF) (XYLOCAINE) 10 mg/mL (1 %) injection 0.1 mL  0.1 mL SubCUTAneous PRN    lactated Ringers infusion  25 mL/hr IntraVENous CONTINUOUS    sodium chloride (NS) flush 5-40 mL  5-40 mL IntraVENous Q8H    sodium chloride (NS) flush 5-40 mL  5-40 mL IntraVENous PRN    oxyCODONE-acetaminophen (PERCOCET) 5-325 mg per tablet 1 Tablet  1 Tablet Oral ONCE PRN    fentaNYL citrate (PF) injection 25 mcg  25 mcg IntraVENous Multiple    morphine injection 2 mg  2 mg IntraVENous Q5MIN PRN    HYDROmorphone (DILAUDID) injection 0.2-0.5 mg  0.2-0.5 mg IntraVENous ONCE    diphenhydrAMINE (BENADRYL) injection 12.5 mg  12.5 mg IntraVENous PRN    meperidine (DEMEROL) injection 12.5 mg  12.5 mg IntraVENous Q5MIN PRN    0.9% sodium chloride infusion  50 mL/hr IntraVENous CONTINUOUS    sodium chloride (NS) flush 5-40 mL  5-40 mL IntraVENous Q8H    sodium chloride (NS) flush 5-40 mL  5-40 mL IntraVENous PRN    midazolam (VERSED) injection 0.25-5 mg  0.25-5 mg IntraVENous Multiple    fentaNYL citrate (PF) injection  mcg   mcg IntraVENous Multiple    naloxone (NARCAN) injection 0.4 mg  0.4 mg IntraVENous Multiple    flumazeniL (ROMAZICON) 0.1 mg/mL injection 0.2 mg  0.2 mg IntraVENous Multiple    simethicone (MYLICON) 05KY/6.1CV oral drops 80 mg  1.2 mL Oral Multiple    atropine injection 0.5 mg  0.5 mg IntraVENous ONCE PRN    EPINEPHrine (ADRENALIN) 0.1 mg/mL syringe 1 mg  1 mg Endoscopically ONCE PRN       Social History:  Social History     Tobacco Use    Smoking status: Never Smoker    Smokeless tobacco: Never Used   Substance Use Topics    Alcohol use: No       Family History:  Family History   Problem Relation Age of Onset    Stroke Mother     Heart Disease Father     Other Sister         benign brain tumor/arthritis    Thyroid Disease Sister         goiter    Diabetes Sister              Review of Systems:      Constitutional: negative fever, negative chills, negative weight loss  Eyes:   negative visual changes  ENT:   negative sore throat, tongue or lip swelling  Respiratory:  negative cough, negative dyspnea  Cards:  negative for chest pain, palpitations, lower extremity edema  GI:   See HPI  :  negative for frequency, dysuria  Integument:  negative for rash and pruritus  Heme:  negative for easy bruising and gum/nose bleeding  Musculoskel: negative for myalgias,  back pain and muscle weakness  Neuro: negative for headaches, dizziness, vertigo  Psych:  negative for feelings of anxiety, depression       Objective:     Patient Vitals for the past 8 hrs:   BP Temp Pulse Resp SpO2 Height Weight   03/28/22 1529 (!) 162/66  73       03/28/22 1514 (!) 168/63 98.6 °F (37 °C) 73 18 95 %     03/28/22 1500      5' 2\" (1.575 m) 96.6 kg (213 lb) No intake/output data recorded. No intake/output data recorded. EXAM:     NEURO-a&o   HEENT-wnl   LUNGS-clear    COR-regular rate and rhythym     ABD-soft , no tenderness, no rebound, good bs     EXT-no edema     Data Review     No results for input(s): WBC, HGB, HCT, PLT, HGBEXT, HCTEXT, PLTEXT in the last 72 hours. No results for input(s): NA, K, CL, CO2, BUN, CREA, GLU, PHOS, CA in the last 72 hours. No results for input(s): AP, TBIL, TP, ALB, GLOB, GGT, AML, LPSE in the last 72 hours. No lab exists for component: SGOT, GPT, AMYP, HLPSE  No results for input(s): INR, PTP, APTT, INREXT in the last 72 hours. Assessment:     · GERD  · Generalized abdominal pain     Patient Active Problem List   Diagnosis Code    Insomnia G47.00    Depression F32. A    Esophageal reflux K21.9    Obesity, unspecified E66.9    Allergic rhinitis due to other allergen J30.89    Schizophrenia (HCC) F20.9    Anxiety F41.9    Arthritis M19.90    Asthma J45.909    HTN, goal below 140/90 I10    IBS (irritable bowel syndrome) K58.9    Osteopenia M85.80    Prediabetes R73.03    Skin cancer C44.90    History of CVA (cerebrovascular accident) Z80.78    Sun-damaged skin L57.8    PFO (patent foramen ovale) Q21.1    AVM (arteriovenous malformation) brain Q28.2    Hyperlipidemia E78.5    Advance care planning Z71.89    Severe obesity (HCC) E66.01    Lipoma of right axilla D17.21     Plan:   · The patient was counseled at length about the risks of rodger Covid-19 in the rama-operative and post-operative states including the recovery window of their procedure. The patient was made aware that rodger Covid-19 after a surgical procedure may worsen their prognosis for recovering from the virus and lend to a higher morbidity and or mortality risk. The patient was given the options of postponing their procedure. All of the risks, benefits, and alternatives were discussed.  The patient does wish to proceed with the procedure. · Endoscopic procedure with MAC     Signed By: Syeda Rae.  Cassi Murphy MD     3/28/2022  3:36 PM

## 2022-04-08 ENCOUNTER — OFFICE VISIT (OUTPATIENT)
Dept: HEMATOLOGY | Age: 72
End: 2022-04-08
Payer: MEDICARE

## 2022-04-08 VITALS
RESPIRATION RATE: 16 BRPM | HEART RATE: 66 BPM | TEMPERATURE: 97.7 F | SYSTOLIC BLOOD PRESSURE: 151 MMHG | DIASTOLIC BLOOD PRESSURE: 63 MMHG | BODY MASS INDEX: 38.83 KG/M2 | WEIGHT: 211 LBS | HEIGHT: 62 IN | OXYGEN SATURATION: 96 %

## 2022-04-08 DIAGNOSIS — K76.0 FATTY LIVER: Primary | ICD-10-CM

## 2022-04-08 DIAGNOSIS — Z11.59 ENCOUNTER FOR SCREENING FOR OTHER VIRAL DISEASES: ICD-10-CM

## 2022-04-08 PROCEDURE — 99204 OFFICE O/P NEW MOD 45 MIN: CPT | Performed by: INTERNAL MEDICINE

## 2022-04-08 PROCEDURE — 1090F PRES/ABSN URINE INCON ASSESS: CPT | Performed by: INTERNAL MEDICINE

## 2022-04-08 PROCEDURE — G9899 SCRN MAM PERF RSLTS DOC: HCPCS | Performed by: INTERNAL MEDICINE

## 2022-04-08 PROCEDURE — G8417 CALC BMI ABV UP PARAM F/U: HCPCS | Performed by: INTERNAL MEDICINE

## 2022-04-08 PROCEDURE — 1101F PT FALLS ASSESS-DOCD LE1/YR: CPT | Performed by: INTERNAL MEDICINE

## 2022-04-08 PROCEDURE — G8427 DOCREV CUR MEDS BY ELIG CLIN: HCPCS | Performed by: INTERNAL MEDICINE

## 2022-04-08 PROCEDURE — G8536 NO DOC ELDER MAL SCRN: HCPCS | Performed by: INTERNAL MEDICINE

## 2022-04-08 PROCEDURE — G9717 DOC PT DX DEP/BP F/U NT REQ: HCPCS | Performed by: INTERNAL MEDICINE

## 2022-04-08 PROCEDURE — 3017F COLORECTAL CA SCREEN DOC REV: CPT | Performed by: INTERNAL MEDICINE

## 2022-04-08 PROCEDURE — G8399 PT W/DXA RESULTS DOCUMENT: HCPCS | Performed by: INTERNAL MEDICINE

## 2022-04-08 RX ORDER — PIROXICAM 20 MG/1
CAPSULE ORAL
COMMUNITY
Start: 2021-12-03 | End: 2022-06-26

## 2022-04-08 RX ORDER — OMEPRAZOLE 20 MG/1
CAPSULE, DELAYED RELEASE ORAL
COMMUNITY
Start: 2022-03-10

## 2022-04-08 RX ORDER — BUPROPION HYDROCHLORIDE 150 MG/1
150 TABLET, EXTENDED RELEASE ORAL DAILY
COMMUNITY
Start: 2022-03-08

## 2022-04-08 NOTE — Clinical Note
4/29/2022    Patient: Syed Diaz   YOB: 1950   Date of Visit: 4/8/2022     Adolfo Tavares MD  [address] [facility] [address]  [address] [place]  [address] [facility] [address]  Via In Basket    Dear MD Jaylyn Amos MD,      Thank you for referring [facility]  [facility]. My notes for this consultation are attached. If you have questions, please do not hesitate to call me. I look forward to following your patient along with you.       Sincerely,    Shikha Robles MD

## 2022-04-08 NOTE — PROGRESS NOTES
[facility] hospitals, MD, [address], [place], [place]      DEIRDRE Small [facility]     Jacqui Camejo, [facility]   SHRUTHI Danielson [facility]       Ilir Deputado Arm[address] De Nielson 136    at [address], [address] [place] [address], [facility] 22.    [phone]    FAX: [address] [place] [address]    at [address], [address], [address]    [phone]    FAX: [phone]       Patient Care Team:  Wes Diaz MD as PCP - General (Family Medicine)  Wes Diaz MD as PCP - [facility]  Cesario Puente MD (Psychiatry)  Jermoe Del Valle MD (Cardiology)  Radha Riggins MD as Physician (Gastroenterology)  Codie Torre DO (Dermatology)  Marjorie Acosta MD as Physician (Ophthalmology)      Problem List  Date Reviewed: 3/28/2022          Codes Class Noted    Lipoma of right axilla ICD-10-CM: D17.21  ICD-9-CM: 214.1  9/10/2019        Severe obesity [facility]) ICD-10-CM: E66.01  ICD-9-CM: 278.01  12/3/2018        Advance care planning ICD-10-CM: Z71.89  ICD-9-CM: V65.49  7/10/2017    Overview Signed 7/10/2017  3:53 PM by Akash Castellon MD     Advance Care Planning:   Patient was offered the opportunity to discuss advance care planning:  yes     Does patient have an Advance Directive:  no   If no, did you provide information on Caring Connections? yes     Conducted ACP discussion today. Advanced Care Planning Information Card copy provided to patient; he/she will reach out to NN/facilitator to review.                  Anxiety ICD-10-CM: F41.9  ICD-9-CM: 300.00  Unknown        Arthritis ICD-10-CM: M19.90  ICD-9-CM: 716.90  Unknown        Asthma ICD-10-CM: J45.909  ICD-9-CM: 493.90  Unknown    Overview Signed 12/5/2016  5:08 PM by Shu Washington MD     in younger years             HTN, goal below 140/90 ICD-10-CM: I10  ICD-9-CM: 401.9  Unknown        IBS (irritable bowel syndrome) ICD-10-CM: K58.9  ICD-9-CM: 564.1  Unknown        Osteopenia ICD-10-CM: M85.80  ICD-9-CM: 733.90  Unknown    Overview Signed 12/5/2016  5:08 PM by Shu Washington MD     started Fosamax therapy 4/2015             Prediabetes ICD-10-CM: R73.03  ICD-9-CM: 790.29  Unknown        Skin cancer ICD-10-CM: C44.90  ICD-9-CM: 173.90  Unknown        History of CVA (cerebrovascular accident) ICD-10-CM: Z86.73  ICD-9-CM: V12.54  Unknown    Overview Signed 12/5/2016  5:08 PM by Shu Washington MD     no deficits             Sun-damaged skin ICD-10-CM: L57.8  ICD-9-CM: 692.79  Unknown        PFO (patent foramen ovale) ICD-10-CM: Q21.1  ICD-9-CM: 745.5  Unknown    Overview Signed 12/5/2016  5:13 PM by Shu Washington MD     reportedly dx'ed with CVA in 2012             Schizophrenia [facility]) ICD-10-CM: F20.9  ICD-9-CM: 295.90  8/8/2012        AVM (arteriovenous malformation) brain ICD-10-CM: Q28.2  ICD-9-CM: 747.81  8/8/2012    Overview Signed 12/5/2016  5:15 PM by Shu Washington MD     MRI 2013; stable ischemic changes; hx of CVA             Insomnia ICD-10-CM: G47.00  ICD-9-CM: 780.52  Unknown        Depression ICD-10-CM: F32. A  ICD-9-CM: 574  9/23/2009        Esophageal reflux ICD-10-CM: K21.9  ICD-9-CM: 530.81  Unknown        Obesity, unspecified ICD-10-CM: E66.9  ICD-9-CM: 278.00  Unknown        Allergic rhinitis due to other allergen ICD-10-CM: J30.89  ICD-9-CM: 477.8  Unknown        Hyperlipidemia ICD-10-CM: E78.5  ICD-9-CM: 272.4  9/23/2009              The clinicians listed above have asked me to see Max Taylor in consultation regarding suspected fatty liver disease and its management.   All medical records sent by the referring physicians were reviewed including imaging studies     The patient is a 67 y.o. female who is suspected to have fatty liver disease based upon ultrasound and CT    Serologic evaluation for markers of chronic liver disease has either not been performed or the results are not available. The most recent imaging of the liver was Ultrasound and CT performed in 1/2022. Results suggest fatty liver disease. Assessment of liver fibrosis with Fibroscan was performed in the office today. The result was 15.6 kPa which correlates with cirrhosis. The CAP score of 343 suggests hepatic steatosis. The patient has the following symptoms which could be attributed to the liver disorder:    fatigue,   pain in the right side over the liver,     The patient is not experiencing the following symptoms which are commonly seen in this liver disorder:   swelling of the lower extremities,     The patient has Moderate limitations in functional activities which can be attributed to other medical problems that are not related to the liver disease. ASSESSMENT AND PLAN:  Fatty liver  Suspect the patient has fatty liver based upon imaging, Fiboscan CAP score, serologic studies that are negative for other causes of chronic liver disease,     Fibroscan in 3/2022 demonstrated  15.6 kPa and  suggesting fatty liver and cirrhosis. Have performed laboratory testing to monitor liver function and degree of liver injury. This included BMP, hepatic panel, CBC with platelet count, INR. Liver transaminases are normal.  ALP is normal.  Liver function is normal.  The platelet count is normal.      Based upon laboratory studies Fibroscan, and imaging the patient may have advanced liver disease or cirrhosis. The need to perform a liver biopsy to help determine the cause and severity of the liver test abnormalities was discussed. The risks of performing the liver biopsy including pain, puncture of the lung, gallbladder, intestine or kidney and bleeding were discussed. The patient has decided to have a liver biopsy. This will be scheduled.     Screening for Hepatocellular Carcinoma  HCC was performed in 2/2022 and does not suggest [facility] [address] Will repeat the imaging study in 6 months. Treatment of other medical problems in patients with chronic liver disease  There are no contraindications for the patient to take most medications that are necessary for treatment of other medical issues. Counseling for alcohol in patients with chronic liver disease  The patient does not consume any significant amount of alcohol. Vaccinations   accination for viral hepatitis A is not needed. The patient has serologic evidence of prior exposure or vaccination with immunity. Routine vaccinations against other bacterial and viral agents can be performed as indicated. Annual flu vaccination should be administered if indicated. ALLERGIES  Allergies   Allergen Reactions    Bee Venom Protein (Honey Bee) Hives    Voltaren [Diclofenac Sodium] Diarrhea       MEDICATIONS  Current Outpatient Medications   Medication Sig    buPROPion SR (Wellbutrin SR) 150 mg SR tablet Take 150 mg by mouth daily.  omeprazole (PRILOSEC) 20 mg capsule     famotidine (PEPCID) 20 mg tablet TAKE 1 TABLET TWICE DAILY AS NEEDED  FOR  REFLUX (Patient taking differently: Take 20 mg by mouth daily.)    hydroCHLOROthiazide (MICROZIDE) 12.5 mg capsule TAKE 1 CAPSULE EVERY DAY    simvastatin (ZOCOR) 40 mg tablet TAKE 1 TABLET EVERY NIGHT    haloperidol (HALDOL) 1 mg tablet Take 1 mg by mouth every other day.  CALCIUM 600 WITH VITAMIN D3 600 mg(1,500mg) -400 unit chew     DOCOSAHEXANOIC ACID/EPA (FISH OIL PO) Take 1 Cap by mouth two (2) times a day.  aspirin (ASPIRIN) 325 mg tablet Take 1 Tab by mouth daily.  FLUoxetine (PROZAC) 40 mg capsule Take 40 mg by mouth daily.  loratadine 10 mg Cap Take 10 mg by mouth daily.  piroxicam (FELDENE) 20 mg capsule  (Patient not taking: Reported on 4/8/2022)    multivitamin (ONE A DAY) tablet Take 1 Tab by mouth daily.  (Patient not taking: Reported on 4/8/2022)     No current facility-administered medications for this visit. SYSTEM REVIEW NOT RELATED TO LIVER DISEASE OR REVIEWED ABOVE:  Constitution systems: Negative for fever, chills, weight gain, weight loss. Eyes: Negative for visual changes. ENT: Negative for sore throat, painful swallowing. Respiratory: Negative for cough, hemoptysis, SOB. Cardiology: Negative for chest pain, palpitations. GI:  Negative for constipation or diarrhea. : Negative for urinary frequency, dysuria, hematuria, nocturia. Skin: Negative for rash. Hematology: Negative for easy bruising, blood clots. Musculo-skelatal: Negative for back pain, muscle pain, weakness. Neurologic: Negative for headaches, dizziness, vertigo, memory problems not related to HE. Psychology: Negative for anxiety, depression. FAMILY HISTORY:  The patient is adopted and does not know any of his family history. SOCIAL HISTORY:  The patient is . The patient has 2 children, 5 grandchildren, 3 GGC  The patient has never used tobacco products. The patient has never consumed significant amounts of alcohol. The patient does not work outside the home. PHYSICAL EXAMINATION:  Visit Vitals  BP (!) 151/63 (BP 1 Location: Left upper arm, BP Patient Position: Sitting, BP Cuff Size: Adult)   Pulse 66   Temp 97.7 °F (36.5 °C) (Temporal)   Resp 16   Ht 5' 2\" (1.575 m)   Wt 211 lb (95.7 kg)   SpO2 96%   BMI 38.59 kg/m²       General: No acute distress. Eyes: Sclera anicteric. ENT: No oral lesions. Thyroid normal.  Nodes: No adenopathy. Skin: No spider angiomata. No jaundice. No palmar erythema. Respiratory: Lungs clear to auscultation. Cardiovascular: Regular heart rate. No murmurs. No JVD. Abdomen: Soft non-tender, liver size normal to percussion/palpation. Spleen not palpable. No obvious ascites. Extremities: No edema. No muscle wasting. No gross arthritic changes.   Neurologic: Alert and oriented. Cranial nerves grossly intact. No asterixis. LABORATORY STUDIES:  Liver [place] [address] & Units 4/8/2022 1/10/2022   WBC 3.6 - 11.0 K/uL 12.1 (H) 10.6   ANC 1.8 - 8.0 K/UL 9.0 (H)    HGB 11.5 - 16.0 g/dL 14.6 14.8    - 400 K/uL 252 275   INR 0.9 - 1.1   1.0    AST 15 - 37 U/L 23 30   ALT 12 - 78 U/L 37 41   Alk Phos 45 - 117 U/L 86 78   Bili, Total 0.2 - 1.0 MG/DL 1.1 (H) 1.0   Bili, Direct 0.0 - 0.2 MG/DL 0.2    Albumin 3.5 - 5.0 g/dL 4.1 4.4   BUN 6 - 20 MG/DL 15 21 (H)   Creat 0.55 - 1.02 MG/DL 1.07 (H) 1.24 (H)   Na 136 - 145 mmol/L 136 138   K 3.5 - 5.1 mmol/L 4.1 4.5   Cl 97 - 108 mmol/L 100 103   CO2 21 - 32 mmol/L 28 28   Glucose 65 - 100 mg/dL 91 112 (H)     SEROLOGIES:  Serologies Latest Ref Rng & Units 4/8/2022   Hep A Ab, Total Negative   Positive (A)   Hep B Surface Ag Index <0.10   Hep B Surface Ag Interp Negative   Negative   Hep B Core Ab, Total Negative   Negative   Hep B Surface Ab mIU/mL <3.10   Hep B Surface Ab Interp NONREACTIVE   NONREACTIVE   Hep C Ab NONREACTIVE   NONREACTIVE   Ferritin 26 - 388 NG/   Iron % Saturation 20 - 50 % 18 (L)   Ceruloplasmin 19.0 - 39.0 mg/dL 27.6   Alpha-1 antitrypsin level 101 - 187 mg/dL 137     LIVER HISTOLOGY:  4/2022. FibroScan performed at [facility]. EkPa was 15.6. IQR/med 96%. . The results suggested a fibrosis level of F4. The high IQR% suggests that the measurement may not be reliable. The CAP score suggests there is hepatic steatosis. ENDOSCOPIC PROCEDURES:  Not available or performed    RADIOLOGY:  1/2022. Ultrasound of liver. Echogenic consistent with fatty liver. No liver mass lesions. No dilated bile ducts. No ascites. 2/2022. CT scan abdomen with IV contrast.  Changes consistent with fatty liver. No liver mass lesions. Normal spleen. No ascites.     OTHER TESTING:  Not available or performed    FOLLOW-UP:  All of the issues listed above in the Assessment and Plan were discussed with the patient. All questions were answered. The patient expressed a clear understanding of the above. [address] [address] in 2 weeks after liver biopsy.       Maria Isabel Koroma MD  [address] 22.  [phone]  [address] [place] [address]

## 2022-04-08 NOTE — PROGRESS NOTES
Identified pt with two pt identifiers(name and ). Reviewed record in preparation for visit and have obtained necessary documentation. Chief Complaint   Patient presents with    New Patient     Establish care      Vitals:    22 1229   BP: (!) 151/63   Pulse: 66   Resp: 16   Temp: 97.7 °F (36.5 °C)   TempSrc: Temporal   SpO2: 96%   Weight: 211 lb (95.7 kg)   Height: 5' 2\" (1.575 m)   PainSc:   6   PainLoc: Abdomen       Health Maintenance Review: Patient reminded of \"due or due soon\" health maintenance. I have asked the patient to contact his/her primary care provider (PCP) for follow-up on his/her health maintenance. Coordination of Care Questionnaire:  :   1) Have you been to an emergency room, urgent care, or hospitalized since your last visit? If yes, where when, and reason for visit? no       2. Have seen or consulted any other health care provider since your last visit? If yes, where when, and reason for visit? NO      Patient is accompanied by  I have received verbal consent from Lory Merchant to discuss any/all medical information while they are present in the room.

## 2022-04-09 LAB
ALBUMIN SERPL-MCNC: 4.1 G/DL (ref 3.5–5)
ALBUMIN/GLOB SERPL: 1.1 {RATIO} (ref 1.1–2.2)
ALP SERPL-CCNC: 86 U/L (ref 45–117)
ALT SERPL-CCNC: 37 U/L (ref 12–78)
ANION GAP SERPL CALC-SCNC: 8 MMOL/L (ref 5–15)
AST SERPL-CCNC: 23 U/L (ref 15–37)
BASOPHILS # BLD: 0.1 K/UL (ref 0–0.1)
BASOPHILS NFR BLD: 1 % (ref 0–1)
BILIRUB DIRECT SERPL-MCNC: 0.2 MG/DL (ref 0–0.2)
BILIRUB SERPL-MCNC: 1.1 MG/DL (ref 0.2–1)
BUN SERPL-MCNC: 15 MG/DL (ref 6–20)
BUN/CREAT SERPL: 14 (ref 12–20)
CALCIUM SERPL-MCNC: 9.3 MG/DL (ref 8.5–10.1)
CHLORIDE SERPL-SCNC: 100 MMOL/L (ref 97–108)
CO2 SERPL-SCNC: 28 MMOL/L (ref 21–32)
CREAT SERPL-MCNC: 1.07 MG/DL (ref 0.55–1.02)
DIFFERENTIAL METHOD BLD: ABNORMAL
EOSINOPHIL # BLD: 0.2 K/UL (ref 0–0.4)
EOSINOPHIL NFR BLD: 1 % (ref 0–7)
ERYTHROCYTE [DISTWIDTH] IN BLOOD BY AUTOMATED COUNT: 12 % (ref 11.5–14.5)
FERRITIN SERPL-MCNC: 284 NG/ML (ref 26–388)
GLOBULIN SER CALC-MCNC: 3.6 G/DL (ref 2–4)
GLUCOSE SERPL-MCNC: 91 MG/DL (ref 65–100)
HBV SURFACE AB SER QL: NONREACTIVE
HBV SURFACE AB SER-ACNC: <3.1 MIU/ML
HBV SURFACE AG SER QL: <0.1 INDEX
HBV SURFACE AG SER QL: NEGATIVE
HCT VFR BLD AUTO: 43.9 % (ref 35–47)
HCV AB SERPL QL IA: NONREACTIVE
HGB BLD-MCNC: 14.6 G/DL (ref 11.5–16)
IMM GRANULOCYTES # BLD AUTO: 0 K/UL (ref 0–0.04)
IMM GRANULOCYTES NFR BLD AUTO: 0 % (ref 0–0.5)
INR PPP: 1 (ref 0.9–1.1)
IRON SATN MFR SERPL: 18 % (ref 20–50)
IRON SERPL-MCNC: 50 UG/DL (ref 35–150)
LYMPHOCYTES # BLD: 1.9 K/UL (ref 0.8–3.5)
LYMPHOCYTES NFR BLD: 16 % (ref 12–49)
MCH RBC QN AUTO: 30.2 PG (ref 26–34)
MCHC RBC AUTO-ENTMCNC: 33.3 G/DL (ref 30–36.5)
MCV RBC AUTO: 90.9 FL (ref 80–99)
MONOCYTES # BLD: 0.9 K/UL (ref 0–1)
MONOCYTES NFR BLD: 7 % (ref 5–13)
NEUTS SEG # BLD: 9 K/UL (ref 1.8–8)
NEUTS SEG NFR BLD: 75 % (ref 32–75)
NRBC # BLD: 0 K/UL (ref 0–0.01)
NRBC BLD-RTO: 0 PER 100 WBC
PLATELET # BLD AUTO: 252 K/UL (ref 150–400)
PMV BLD AUTO: 11 FL (ref 8.9–12.9)
POTASSIUM SERPL-SCNC: 4.1 MMOL/L (ref 3.5–5.1)
PROT SERPL-MCNC: 7.7 G/DL (ref 6.4–8.2)
PROTHROMBIN TIME: 10.4 SEC (ref 9–11.1)
RBC # BLD AUTO: 4.83 M/UL (ref 3.8–5.2)
SODIUM SERPL-SCNC: 136 MMOL/L (ref 136–145)
TIBC SERPL-MCNC: 279 UG/DL (ref 250–450)
WBC # BLD AUTO: 12.1 K/UL (ref 3.6–11)

## 2022-04-10 LAB
HAV AB SER QL IA: POSITIVE
HBV CORE AB SERPL QL IA: NEGATIVE

## 2022-04-11 ENCOUNTER — TELEPHONE (OUTPATIENT)
Dept: HEMATOLOGY | Age: 72
End: 2022-04-11

## 2022-04-11 LAB
A1AT SERPL-MCNC: 137 MG/DL (ref 101–187)
CERULOPLASMIN SERPL-MCNC: 27.6 MG/DL (ref 19–39)

## 2022-04-11 NOTE — TELEPHONE ENCOUNTER
Patient got a My Chart message that Hep A was positive and would like a call back to discuss results/ next steps

## 2022-04-11 NOTE — TELEPHONE ENCOUNTER
[email or web address] Spoke w/patient/ concerning message left. Reviewed recent labs and patient has a past history of exposure to Hep A--immune. Patient/ verbalize understanding. (KF)

## 2022-05-03 ENCOUNTER — TRANSCRIBE ORDER (OUTPATIENT)
Dept: SCHEDULING | Age: 72
End: 2022-05-03

## 2022-05-03 DIAGNOSIS — K76.0 FATTY METAMORPHOSIS OF LIVER: Primary | ICD-10-CM

## 2022-05-11 NOTE — TELEPHONE ENCOUNTER
Last Visit: 1/10/22 MD Cherelle Dutta, labs completed  Next Appointment: None  Previous Refill Encounter(s): 12/15/21 90 + 1    Requested Prescriptions     Pending Prescriptions Disp Refills    hydroCHLOROthiazide (MICROZIDE) 12.5 mg capsule 90 Capsule 1     Sig: Take 1 Capsule by mouth daily.

## 2022-05-13 RX ORDER — HYDROCHLOROTHIAZIDE 12.5 MG/1
12.5 CAPSULE ORAL DAILY
Qty: 90 CAPSULE | Refills: 1 | Status: SHIPPED | OUTPATIENT
Start: 2022-05-13 | End: 2022-10-06 | Stop reason: SDUPTHER

## 2022-05-26 ENCOUNTER — APPOINTMENT (OUTPATIENT)
Dept: ULTRASOUND IMAGING | Age: 72
End: 2022-05-26
Attending: INTERNAL MEDICINE
Payer: MEDICARE

## 2022-05-26 ENCOUNTER — HOSPITAL ENCOUNTER (OUTPATIENT)
Age: 72
Setting detail: OUTPATIENT SURGERY
Discharge: HOME | End: 2022-05-26
Attending: INTERNAL MEDICINE | Admitting: INTERNAL MEDICINE
Payer: MEDICARE

## 2022-05-26 VITALS
BODY MASS INDEX: 37.36 KG/M2 | SYSTOLIC BLOOD PRESSURE: 157 MMHG | HEIGHT: 62 IN | TEMPERATURE: 97.8 F | OXYGEN SATURATION: 97 % | RESPIRATION RATE: 18 BRPM | DIASTOLIC BLOOD PRESSURE: 55 MMHG | HEART RATE: 60 BPM | WEIGHT: 203 LBS

## 2022-05-26 DIAGNOSIS — K76.0 FATTY METAMORPHOSIS OF LIVER: ICD-10-CM

## 2022-05-26 DIAGNOSIS — K76.0 NAFLD (NONALCOHOLIC FATTY LIVER DISEASE): ICD-10-CM

## 2022-05-26 PROCEDURE — 76942 ECHO GUIDE FOR BIOPSY: CPT

## 2022-05-26 PROCEDURE — 77030014115: Performed by: INTERNAL MEDICINE

## 2022-05-26 PROCEDURE — 47000 NEEDLE BIOPSY OF LIVER PERQ: CPT | Performed by: INTERNAL MEDICINE

## 2022-05-26 PROCEDURE — 88313 SPECIAL STAINS GROUP 2: CPT

## 2022-05-26 PROCEDURE — 76942 ECHO GUIDE FOR BIOPSY: CPT | Performed by: INTERNAL MEDICINE

## 2022-05-26 PROCEDURE — 77030013826 HC NDL BIOP MAXCOR BARD -B: Performed by: INTERNAL MEDICINE

## 2022-05-26 PROCEDURE — 88307 TISSUE EXAM BY PATHOLOGIST: CPT

## 2022-05-26 PROCEDURE — 76040000019: Performed by: INTERNAL MEDICINE

## 2022-05-26 RX ORDER — ONDANSETRON 2 MG/ML
4 INJECTION INTRAMUSCULAR; INTRAVENOUS
Status: DISCONTINUED | OUTPATIENT
Start: 2022-05-26 | End: 2022-05-26 | Stop reason: HOSPADM

## 2022-05-26 RX ORDER — SODIUM CHLORIDE 0.9 % (FLUSH) 0.9 %
5-40 SYRINGE (ML) INJECTION EVERY 8 HOURS
Status: DISCONTINUED | OUTPATIENT
Start: 2022-05-26 | End: 2022-05-26 | Stop reason: HOSPADM

## 2022-05-26 RX ORDER — LIDOCAINE HYDROCHLORIDE 10 MG/ML
INJECTION, SOLUTION EPIDURAL; INFILTRATION; INTRACAUDAL; PERINEURAL
Status: DISCONTINUED
Start: 2022-05-26 | End: 2022-05-26 | Stop reason: HOSPADM

## 2022-05-26 RX ORDER — FENTANYL CITRATE 50 UG/ML
25 INJECTION, SOLUTION INTRAMUSCULAR; INTRAVENOUS
Status: DISCONTINUED | OUTPATIENT
Start: 2022-05-26 | End: 2022-05-26 | Stop reason: HOSPADM

## 2022-05-26 RX ORDER — LIDOCAINE HYDROCHLORIDE 10 MG/ML
10 INJECTION INFILTRATION; PERINEURAL ONCE
Status: DISCONTINUED | OUTPATIENT
Start: 2022-05-26 | End: 2022-05-26 | Stop reason: HOSPADM

## 2022-05-26 RX ORDER — SODIUM CHLORIDE 0.9 % (FLUSH) 0.9 %
5-40 SYRINGE (ML) INJECTION AS NEEDED
Status: DISCONTINUED | OUTPATIENT
Start: 2022-05-26 | End: 2022-05-26 | Stop reason: HOSPADM

## 2022-05-26 NOTE — DISCHARGE INSTRUCTIONS
[address] [facility], MD, FACP, [facility], [place]      DEIRDRE Joseph, [facility]     April S Bashir, [facility]   YOSSI Aguilar-ARAMIS Garcia, [facility]       [facility] 136    at [address], [address] [address]  22.    [phone]    FAX: [address] [place] [address] [address], [address]    [phone]    FAX: [phone]         LIVER BIOPSY DISCHARGE INSTRUCTIONS      Lucretia Grace  1950  Date: 5/26/2022    DIET:    Fay Parish may resume your previous diet. ACTIVITIES:  Rest quietly the rest of today. You should not lift any objects more than 20 pounds for the next 2 days. If you work sitting down without strenuous activity you may return to work tomorrow. If you exert yourself or do heavy lifting at work you should take tomorrow off. Do not drive or operate hazardous machinery for 12 hours after you are discharged from this procedure. SPECIAL INSTRUCTIONS:  Do not use any aspirin or non-steroidal (Motin, Advil, Naproxen, etc) pain medications for the next 2 days. You may use extra-strength Tylenol (acetaminophen) if you experience pain or discomfort later today. Restarting blood thinners: If you were taking blood thinners prior to the procedure you can restart these in 2 days. Call the [address] office if you experience any of the following:  Persistent or severe abdominal pain. Persistent or severe abdominal distention. Fever and chills   Nausea and vomiting. New or unusual symptoms. Follow-up care: You should have a follow up appointment with Dr. Kat Richards to review the results of the liver biopsy results in 2 weeks.   If you do not have an appointment please call the office at the number listed above to schedule this. Other instructions: If you have any problems or questions call the [facility] office at the phone number listed above. DISCHARGE SUMMARY from Nurse: The following personal items collected during your admission are returned to you:   Dental Appliance: Dental Appliances: None  Vision: Visual Aid: Glasses,At bedside  Hearing Aid:    Jewelry:    Clothing:    Other Valuables:    Valuables sent to safe:            Learning About Coronavirus (COVID-19)  Coronavirus (COVID-19): Overview  What is coronavirus (ODFGP-04)? The coronavirus disease (COVID-19) is caused by a virus. It is an illness that was first found in [place], [place], in December 2019. It has since spread worldwide. The virus can cause fever, cough, and trouble breathing. In severe cases, it can cause pneumonia and make it hard to breathe without help. It can cause death. Coronaviruses are a large group of viruses. They cause the common cold. They also cause more serious illnesses like Middle East respiratory syndrome (MERS) and severe acute respiratory syndrome (SARS). COVID-19 is caused by a novel coronavirus. That means it's a new type that has not been seen in people before. This virus spreads person-to-person through droplets from coughing and sneezing. It can also spread when you are close to someone who is infected. And it can spread when you touch something that has the virus on it, such as a doorknob or a tabletop. What can you do to protect yourself from coronavirus (COVID-19)? The best way to protect yourself from getting sick is to:  · Avoid areas where there is an outbreak. · Avoid contact with people who may be infected. · Wash your hands often with soap or alcohol-based hand sanitizers. · Avoid crowds and try to stay at least 6 feet away from other people. · Wash your hands often, especially after you cough or sneeze.  Use soap and water, and scrub for at least 20 seconds. If soap and water aren't available, use an alcohol-based hand . · Avoid touching your mouth, nose, and eyes. What can you do to avoid spreading the virus to others? To help avoid spreading the virus to others:  · Cover your mouth with a tissue when you cough or sneeze. Then throw the tissue in the trash. · Use a disinfectant to clean things that you touch often. · Stay home if you are sick or have been exposed to the virus. Don't go to school, work, or public areas. And don't use public transportation. · If you are sick:  ? Leave your home only if you need to get medical care. But call the doctor's office first so they know you're coming. And wear a face mask, if you have one.  ? If you have a face mask, wear it whenever you're around other people. It can help stop the spread of the virus when you cough or sneeze. ? Clean and disinfect your home every day. Use household  and disinfectant wipes or sprays. Take special care to clean things that you grab with your hands. These include doorknobs, remote controls, phones, and handles on your refrigerator and microwave. And don't forget countertops, tabletops, bathrooms, and computer keyboards. When to call for help  Call 911 anytime you think you may need emergency care. For example, call if:  · You have severe trouble breathing. (You can't talk at all.)  · You have constant chest pain or pressure. · You are severely dizzy or lightheaded. · You are confused or can't think clearly. · Your face and lips have a blue color. · You pass out (lose consciousness) or are very hard to wake up. Call your doctor now if you develop symptoms such as:  · Shortness of breath. · Fever. · Cough. If you need to get care, call ahead to the doctor's office for instructions before you go. Make sure you wear a face mask, if you have one, to prevent exposing other people to the virus. Where can you get the latest information?   The following [facility] organizations are tracking and studying this virus. Their websites contain the most up-to-date information. Salud Cranes also learn what to do if you think you may have been exposed to the virus. · U.S. Centers for Disease Control and Prevention (CDC): The CDC provides updated news about the disease and travel advice. The website also tells you how to prevent the spread of infection. www.cdc.gov  · World Health Organization [facility]): WHO offers information about the virus outbreaks. WHO also has travel advice. www.who.int  Current as of: April 1, 2020               Content Version: 12.4  © 3546-2872 [employer]. Care instructions adapted under license by your healthcare professional. If you have questions about a medical condition or this instruction, always ask your healthcare professional. [address] any warranty or liability for your use of this information.

## 2022-05-26 NOTE — H&P
[facility]40 hospitals, MD, [address], [place], [place]      DEIRDRE Bal, [facility]-BC     Jacqui Camejo [facility]   Lexie Johns, FNP-C Cherise Ganser, [facility]       Ilir Roberts [address] 136    at [address], [address] [place], [place] [address]    [facility], [place]  22.    [phone]    FAX: [address] [place] [address] Drive, [address], [address]    [phone]    FAX: [phone]         PRE-PROCEDURE NOTE - LIVER BIOPSY    H and P from last office visit reviewed. Allergies reviewed. Out-patient medication list reviewed. Patient Active Problem List   Diagnosis Code    Insomnia G47.00    Depression F32. A    Esophageal reflux K21.9    Obesity, unspecified E66.9    Allergic rhinitis due to other allergen J30.89    Schizophrenia (HCC) F20.9    Anxiety F41.9    Arthritis M19.90    Asthma J45.909    HTN, goal below 140/90 I10    IBS (irritable bowel syndrome) K58.9    Osteopenia M85.80    Prediabetes R73.03    Skin cancer C44.90    History of CVA (cerebrovascular accident) Z80.78    Sun-damaged skin L57.8    PFO (patent foramen ovale) Q21.1    AVM (arteriovenous malformation) brain Q28.2    Hyperlipidemia E78.5    Advance care planning Z71.89    Severe obesity (HCC) E66.01    Lipoma of right axilla D17.21       Allergies   Allergen Reactions    Bee Venom Protein (Honey Bee) Hives    Voltaren [Diclofenac Sodium] Diarrhea       No current facility-administered medications on file prior to encounter. Current Outpatient Medications on File Prior to Encounter   Medication Sig Dispense Refill    buPROPion SR (Wellbutrin SR) 150 mg SR tablet Take 150 mg by mouth daily.       omeprazole (PRILOSEC) 20 mg capsule       famotidine (PEPCID) 20 mg tablet TAKE 1 TABLET TWICE DAILY AS NEEDED  FOR  REFLUX (Patient taking differently: Take 20 mg by mouth daily. ) 180 Tablet 1    simvastatin (ZOCOR) 40 mg tablet TAKE 1 TABLET EVERY NIGHT 90 Tablet 3    CALCIUM 600 WITH VITAMIN D3 600 mg(1,500mg) -400 unit chew       DOCOSAHEXANOIC ACID/EPA (FISH OIL PO) Take 1 Cap by mouth two (2) times a day.  aspirin (ASPIRIN) 325 mg tablet Take 1 Tab by mouth daily. 365 Tab 0    FLUoxetine (PROZAC) 40 mg capsule Take 40 mg by mouth daily.  loratadine 10 mg Cap Take 10 mg by mouth daily.  piroxicam (FELDENE) 20 mg capsule  (Patient not taking: Reported on 4/8/2022)      haloperidol (HALDOL) 1 mg tablet Take 1 mg by mouth every other day.  multivitamin (ONE A DAY) tablet Take 1 Tab by mouth daily. (Patient not taking: Reported on 4/8/2022)         For liver biopsy to assess NALFD. The risks of the procedure were discussed with the patient. This included bleeding, pain, and puncture of other organs. All questions were answered. The patient wishes to proceed with the procedure. The patient was counseled at length about the risks of rodger Covid-19 in the rama-operative and post-operative states including the recovery window of their procedure. The patient was made aware that rodger Covid-19 after a surgical procedure may worsen their prognosis for recovering from the virus and lend to a higher morbidity and or mortality risk. The patient was given the options of postponing their procedure. All of the risks, benefits, and alternatives were discussed. The patient does  wish to proceed with the procedure. PHYSICAL EXAMINATION:  Visit Vitals  Breastfeeding No       General: No acute distress. Eyes: Sclera anicteric. ENT: No oral lesions. Thyroid normal.  Nodes: No adenopathy. Skin: No spider angiomata. No jaundice. No palmar erythema. Respiratory: Lungs clear to auscultation. Cardiovascular: Regular heart rate. No murmurs. No JVD. Abdomen: Soft non-tender, liver size normal to percussion/palpation. Spleen not palpable. No obvious ascites. Extremities: No edema. No muscle wasting. No gross arthritic changes. Neurologic: Alert and oriented. Cranial nerves grossly intact. No asterixis. LABS:  Lab Results   Component Value Date/Time    WBC 12.1 (H) 04/08/2022 02:00 PM    HGB 14.6 04/08/2022 02:00 PM    HCT 43.9 04/08/2022 02:00 PM    PLATELET 381 59/21/3410 02:00 PM    MCV 90.9 04/08/2022 02:00 PM     Lab Results   Component Value Date/Time    INR 1.0 04/08/2022 02:00 PM    Prothrombin time 10.4 04/08/2022 02:00 PM       ASSESSMENT AND PLAN:  Liver biopsy under ultrasound guidance.     Ahmet Rodríguez MD  [facility] 13  [address] [address].  [phone]  [address] [place] [address]

## 2022-05-26 NOTE — PROGRESS NOTES
D/c instructions and site care given to pt. Site with no oozing or hematoma noted. Applied new band aid to site.

## 2022-05-26 NOTE — PROCEDURES
[facility] [facility], MD, [address], [place], [place]      DEIRDRE Joseph, ACNP-BC     April SUSAN Camejo, PCNP-BC   SHRUTHI Aguilar, [facility]       Ilir Vargas [facility] 136    at [address], [facility] [place]  22. [phone]    FAX: [address] [place] [address], [address]    [phone]    FAX: [phone]       LIVER BIOPSY PROCEDURE NOTE    Lucretia Grace  1950    INDICATIONS/PRE-OPERATIVE  DIAGNOSIS:  NAFLD    : Jonel Kehr, MD    SURGICAL ASSISTANT:  None    PROSTHETIC DEVICES, TISSUE GRAFTS, TRANSPLANTED ORGANS:  Not applicable    SEDATION: 1% Lidocaine injection 10 ml    PROCEDURE:  Informed consent to perform the procedure was obtained from the patient. The patient was positioned on the edge of the stretcher lying flat in the supine position. Ultrasound was utilized to image the liver. The diaphragm and any major mass lesion or vascular structures within the liver were identified. An appropriate site for liver biopsy was identified. The distance from the surface of the skin to the liver capsule was 4 cm. This area was prepped with betadine and draped in sterile fashion. The skin was infiltrated with 1% lidocaine. The deeper subcutanous tissues and liver capsule overlying the biopsy site were then infiltrated with 1% lidocaine until appropriate anesthesia was obtained. A small incision was made in the skin so the biopsy devise could be easily inserted. A total of 2 passes with the 16 gauge Bard biopsy devise was then made into the liver. Core(s) of liver tissue totaling 4 cm in length were obtained and placed into tissue fixative.   A band aid was placed over the biopsy site. The patient was then repositioned on the right side and transported to the recovery area on the stretcher for routine monitoring until discharge. The specimen was sent to pathology for processing via the normal transport mechanism. SPECIMEN COLLECTED: Liver    INTERVENTIONS:  None    ESTIMATED BLOOD LOSS: Negligible.      COMPLICATIONS:  None    POST-OPERATIVE DIAGNOSIS: Same as Pre-operative Diagnosis      MD [address] [place] [address] 22.  [phone]  [address] [place] [address]

## 2022-06-10 ENCOUNTER — OFFICE VISIT (OUTPATIENT)
Dept: HEMATOLOGY | Age: 72
End: 2022-06-10
Payer: MEDICARE

## 2022-06-10 VITALS
HEIGHT: 62 IN | DIASTOLIC BLOOD PRESSURE: 56 MMHG | WEIGHT: 203 LBS | TEMPERATURE: 97.5 F | OXYGEN SATURATION: 98 % | BODY MASS INDEX: 37.36 KG/M2 | SYSTOLIC BLOOD PRESSURE: 127 MMHG | HEART RATE: 62 BPM

## 2022-06-10 DIAGNOSIS — K75.81 NASH (NONALCOHOLIC STEATOHEPATITIS): Primary | ICD-10-CM

## 2022-06-10 PROCEDURE — G9717 DOC PT DX DEP/BP F/U NT REQ: HCPCS | Performed by: INTERNAL MEDICINE

## 2022-06-10 PROCEDURE — G9899 SCRN MAM PERF RSLTS DOC: HCPCS | Performed by: INTERNAL MEDICINE

## 2022-06-10 PROCEDURE — 1123F ACP DISCUSS/DSCN MKR DOCD: CPT | Performed by: INTERNAL MEDICINE

## 2022-06-10 PROCEDURE — 1101F PT FALLS ASSESS-DOCD LE1/YR: CPT | Performed by: INTERNAL MEDICINE

## 2022-06-10 PROCEDURE — 1090F PRES/ABSN URINE INCON ASSESS: CPT | Performed by: INTERNAL MEDICINE

## 2022-06-10 PROCEDURE — 3017F COLORECTAL CA SCREEN DOC REV: CPT | Performed by: INTERNAL MEDICINE

## 2022-06-10 PROCEDURE — G8536 NO DOC ELDER MAL SCRN: HCPCS | Performed by: INTERNAL MEDICINE

## 2022-06-10 PROCEDURE — G8399 PT W/DXA RESULTS DOCUMENT: HCPCS | Performed by: INTERNAL MEDICINE

## 2022-06-10 PROCEDURE — 99214 OFFICE O/P EST MOD 30 MIN: CPT | Performed by: INTERNAL MEDICINE

## 2022-06-10 PROCEDURE — G8417 CALC BMI ABV UP PARAM F/U: HCPCS | Performed by: INTERNAL MEDICINE

## 2022-06-10 PROCEDURE — G8427 DOCREV CUR MEDS BY ELIG CLIN: HCPCS | Performed by: INTERNAL MEDICINE

## 2022-06-10 NOTE — PROGRESS NOTES
3340 [facility], [place], [place], [facility], [place]      DEIRDRE Cedillo, ACNP-BC     April S Bashir, [facility]   Zoya SHAR DavilaP-ARAMIS Chicas, [facility]       Ilir Vargas [facility] 136    at [place] [address] [address], [address] [place]  22.    [phone]    FAX: [address] [place] [address], [address]    [phone]    FAX: [phone]       Patient Care Team:  Nohemy Leung MD as PCP - General (Family Medicine)  Nohemy Leung MD as PCP - [facility] Empaneled Provider  Afsaneh Meraz MD (Psychiatry)  Jamila Hedrick MD (Cardiovascular Disease Physician)  Alan Biggs MD as Physician (Gastroenterology)  Mary Read DO (Dermatology Physician)  Swetha Manning MD as Physician (Ophthalmology)      Problem List  Date Reviewed: 6/26/2022          Codes Class Noted    MOSS (nonalcoholic steatohepatitis) ICD-10-CM: E22.87  ICD-9-CM: 571.8  Unknown        Anxiety ICD-10-CM: F41.9  ICD-9-CM: 300.00  Unknown        Arthritis ICD-10-CM: M19.90  ICD-9-CM: 716.90  Unknown        Asthma ICD-10-CM: J45.909  ICD-9-CM: 493.90  Unknown    Overview Signed 12/5/2016  5:08 PM by Brit Harley MD     in younger years             Hypertension ICD-10-CM: I10  ICD-9-CM: 401.9  Unknown        IBS (irritable bowel syndrome) ICD-10-CM: K58.9  ICD-9-CM: 564.1  Unknown        Osteopenia ICD-10-CM: M85.80  ICD-9-CM: 733.90  Unknown    Overview Signed 12/5/2016  5:08 PM by Brit Harley MD     started Fosamax therapy 4/2015             Prediabetes ICD-10-CM: R73.03  ICD-9-CM: 790.29  Unknown        Skin cancer ICD-10-CM: C44.90  ICD-9-CM: 173.90  Unknown        History of CVA (cerebrovascular accident) ICD-10-CM: K11.15  ICD-9-CM: V12.54  Unknown    Overview Signed 12/5/2016  5:08 PM by Blair Brittle, MD     no deficits             PFO (patent foramen ovale) ICD-10-CM: Q21.1  ICD-9-CM: 745.5  Unknown    Overview Signed 12/5/2016  5:13 PM by Blair Brittle, MD     reportedly dx'ed with CVA in 2012             Schizophrenia [facility]) ICD-10-CM: F20.9  ICD-9-CM: 295.90  8/8/2012        AVM (arteriovenous malformation) brain ICD-10-CM: Q28.2  ICD-9-CM: 747.81  8/8/2012    Overview Signed 12/5/2016  5:15 PM by Blair Brittle, MD     MRI 2013; stable ischemic changes; hx of CVA             Insomnia ICD-10-CM: G47.00  ICD-9-CM: 780.52  Unknown        Depression ICD-10-CM: F32. A  ICD-9-CM: 230  9/23/2009        Esophageal reflux ICD-10-CM: K21.9  ICD-9-CM: 530.81  Unknown        Obesity, unspecified ICD-10-CM: E66.9  ICD-9-CM: 278.00  Unknown        Allergic rhinitis due to other allergen ICD-10-CM: J30.89  ICD-9-CM: 477.8  Unknown        Hyperlipidemia ICD-10-CM: E78.5  ICD-9-CM: 272.4  9/23/2009              Yamile Dawkins is being seen at [address] for management of non-alcoholic steatohepatitis (MOSS). The active problem list, all pertinent past medical history, medications, liver histology, radiologic findings and laboratory findings related to the liver disorder were reviewed and discussed with the patient. The patient is a 67 y.o. female who is suspected to have fatty liver disease based upon ultrasound and CT    The patient underwent a liver biopsy in 5/2022. The procedure was well tolerated. I have personally reviewed and interpreted the liver biopsy slides. This demonstrates mild MOSS with stage 2 fibrosis.       The patient has the following symptoms which could be attributed to the liver disorder:    fatigue,   pain in the right side over the liver,     The patient is not experiencing the following symptoms which are commonly seen in this liver disorder:   swelling of the lower extremities, The patient has Moderate limitations in functional activities which can be attributed to other medical problems that are not related to the liver disease. ASSESSMENT AND PLAN:  MOSS  The diagnosis is based upon liver biopsy, features of metabolic syndrome, serologic studies that are negative for other causes of chronic liver disease,     A liver biopsy performed in 5/2022 demonstrates mioderate steatosis, mild inflammation, mild ballooning, stage 2 fibrosis. Fibroscan in 4/2022 demonstrated  15.6 kPa and  suggesting fatty liver and cirrhosis. Liver transaminases are normal.  ALP is normal.  Liver function is normal.  The platelet count is normal.      If the patient looses 20% of current body weight, which is 40 pounds, down to a weight of of 160 pounds, all steatosis will have resolved. Once all steatosis has resolved all inflammation will resolve. Then all fibrosis will gradually resolve and the liver could eventually be normal.    There is currently no FDA approved medical treatment for fatty liver, NALFD or MOSS. The only medical treatments for MOSS are though clinical trials. The patient was offered participation in a clinical trial for treatment of MOSS. Counseling for diet and weight loss in patients with confirmed or suspected NAFLD  The patient was counseled regarding diet and exercise to achieve weight loss. The best diet for patients with fatty liver is one very low in carbohydrates and enriched with protein such as an Miguelangel's program.      The patient was told not to consume any food products and drinks containing fructose as this enhances hepatic fat synthesis. There is no medication or vitamin supplements that we advocate for MOSS. Using glitazones in patients without diabetes mellitus has been shown to reduce fat content in the liver but has no effect on fibrosis and is associated with weight gain.   Vitamin E has also been used but the data is not very good and most experts no longer advocate this. Screening for Hepatocellular Carcinoma  HCC screening is not necessary if the patient does not have cirrhosis. Treatment of other medical problems in patients with chronic liver disease  There are no contraindications for the patient to take most medications that are necessary for treatment of other medical issues. Counseling for alcohol in patients with chronic liver disease  The patient does not consume any significant amount of alcohol. Vaccinations   accination for viral hepatitis A is not needed. The patient has serologic evidence of prior exposure or vaccination with immunity. Routine vaccinations against other bacterial and viral agents can be performed as indicated. Annual flu vaccination should be administered if indicated. ALLERGIES  Allergies   Allergen Reactions    Bee Venom Protein (Honey Bee) Hives    Voltaren [Diclofenac Sodium] Diarrhea       MEDICATIONS  Current Outpatient Medications   Medication Sig    hydroCHLOROthiazide (MICROZIDE) 12.5 mg capsule Take 1 Capsule by mouth daily.  buPROPion SR (Wellbutrin SR) 150 mg SR tablet Take 150 mg by mouth daily.  omeprazole (PRILOSEC) 20 mg capsule     simvastatin (ZOCOR) 40 mg tablet TAKE 1 TABLET EVERY NIGHT    haloperidol (HALDOL) 1 mg tablet Take 1 mg by mouth every other day.  CALCIUM 600 WITH VITAMIN D3 600 mg(1,500mg) -400 unit chew     DOCOSAHEXANOIC ACID/EPA (FISH OIL PO) Take 1 Cap by mouth two (2) times a day.  aspirin (ASPIRIN) 325 mg tablet Take 1 Tab by mouth daily.  FLUoxetine (PROZAC) 40 mg capsule Take 40 mg by mouth daily.  famotidine (PEPCID) 20 mg tablet Take 1 Tablet by mouth two (2) times daily as needed (Reflux).  loratadine 10 mg Cap Take 10 mg by mouth daily. No current facility-administered medications for this visit.        SYSTEM REVIEW NOT RELATED TO LIVER DISEASE OR REVIEWED ABOVE:  Constitution systems: Negative for fever, chills, weight gain, weight loss. Eyes: Negative for visual changes. ENT: Negative for sore throat, painful swallowing. Respiratory: Negative for cough, hemoptysis, SOB. Cardiology: Negative for chest pain, palpitations. GI:  Negative for constipation or diarrhea. : Negative for urinary frequency, dysuria, hematuria, nocturia. Skin: Negative for rash. Hematology: Negative for easy bruising, blood clots. Musculo-skelatal: Negative for back pain, muscle pain, weakness. Neurologic: Negative for headaches, dizziness, vertigo, memory problems not related to HE. Psychology: Negative for anxiety, depression. FAMILY HISTORY:  The patient is adopted and does not know any of his family history. SOCIAL HISTORY:  The patient is . The patient has 2 children, 5 grandchildren, 3 GGC  The patient has never used tobacco products. The patient has never consumed significant amounts of alcohol. The patient does not work outside the home. PHYSICAL EXAMINATION:  Visit Vitals  BP (!) 127/56 (BP 1 Location: Right arm, BP Patient Position: Sitting, BP Cuff Size: Adult)   Pulse 62   Temp 97.5 °F (36.4 °C) (Temporal)   Ht 5' 2\" (1.575 m)   Wt 203 lb (92.1 kg)   SpO2 98%   BMI 37.13 kg/m²       General: No acute distress. Eyes: Sclera anicteric. ENT: No oral lesions. Thyroid normal.  Nodes: No adenopathy. Skin: No spider angiomata. No jaundice. No palmar erythema. Respiratory: Lungs clear to auscultation. Cardiovascular: Regular heart rate. No murmurs. No JVD. Abdomen: Soft non-tender, liver size normal to percussion/palpation. Spleen not palpable. No obvious ascites. Extremities: No edema. No muscle wasting. No gross arthritic changes. Neurologic: Alert and oriented. Cranial nerves grossly intact. No asterixis.       LABORATORY STUDIES:  Liver [place] of [address] & Units 4/8/2022 1/10/2022   WBC 3.6 - 11.0 K/uL 12.1 (H) 10.6   ANC 1.8 - 8.0 K/UL 9.0 (H) HGB 11.5 - 16.0 g/dL 14.6 14.8    - 400 K/uL 252 275   INR 0.9 - 1.1   1.0    AST 15 - 37 U/L 23 30   ALT 12 - 78 U/L 37 41   Alk Phos 45 - 117 U/L 86 78   Bili, Total 0.2 - 1.0 MG/DL 1.1 (H) 1.0   Bili, Direct 0.0 - 0.2 MG/DL 0.2    Albumin 3.5 - 5.0 g/dL 4.1 4.4   BUN 6 - 20 MG/DL 15 21 (H)   Creat 0.55 - 1.02 MG/DL 1.07 (H) 1.24 (H)   Na 136 - 145 mmol/L 136 138   K 3.5 - 5.1 mmol/L 4.1 4.5   Cl 97 - 108 mmol/L 100 103   CO2 21 - 32 mmol/L 28 28   Glucose 65 - 100 mg/dL 91 112 (H)     SEROLOGIES:  Serologies Latest Ref Rng & Units 4/8/2022   Hep A Ab, Total Negative   Positive (A)   Hep B Surface Ag Index <0.10   Hep B Surface Ag Interp Negative   Negative   Hep B Core Ab, Total Negative   Negative   Hep B Surface Ab mIU/mL <3.10   Hep B Surface Ab Interp NONREACTIVE   NONREACTIVE   Hep C Ab NONREACTIVE   NONREACTIVE   Ferritin 26 - 388 NG/   Iron % Saturation 20 - 50 % 18 (L)   Ceruloplasmin 19.0 - 39.0 mg/dL 27.6   Alpha-1 antitrypsin level 101 - 187 mg/dL 137     LIVER HISTOLOGY:  4/2022. FibroScan performed at [address]. EkPa was 15.6. IQR/med 96%. . The results suggested a fibrosis level of F4. The high IQR% suggests that the measurement may not be reliable. The CAP score suggests there is hepatic steatosis. 5/2022. Slides reviewed by MLS. MOSS. 50-66% macrovesicualr and micovesicular steatosis, mild inflammation, mild ballooning, Stage 2 fibrosis. JUAN (211). ENDOSCOPIC PROCEDURES:  Not available or performed    RADIOLOGY:  1/2022. Ultrasound of liver. Echogenic consistent with fatty liver. No liver mass lesions. No dilated bile ducts. No ascites. 2/2022. CT scan abdomen with IV contrast.  Changes consistent with fatty liver. No liver mass lesions. Normal spleen. No ascites. OTHER TESTING:  Not available or performed    FOLLOW-UP:  All of the issues listed above in the Assessment and Plan were discussed with the patient.   All questions were answered. The patient expressed a clear understanding of the above. Follow-up Gustavo Velasquez 32 in for screening and enrollment into a clinical trial for treatment of MOSS.   The patient will be given a follow-up appointment in 6 months if excluded from entering the clinical trial.      Mayda Kyle MD  [facility] 1, [address] [facility] 22.  [phone]  [address] [place] [address]

## 2022-06-10 NOTE — PROGRESS NOTES
Identified pt with two pt identifiers(name and ). Reviewed record in preparation for visit and have obtained necessary documentation. No chief complaint on file. There were no vitals filed for this visit. Health Maintenance Review: Patient reminded of \"due or due soon\" health maintenance. I have asked the patient to contact his/her primary care provider (PCP) for follow-up on his/her health maintenance. Coordination of Care Questionnaire:  :   1) Have you been to an emergency room, urgent care, or hospitalized since your last visit? If yes, where when, and reason for visit? no       2. Have seen or consulted any other health care provider since your last visit? If yes, where when, and reason for visit? NO      Patient is accompanied by spouse I have received verbal consent from Jh Garcia to discuss any/all medical information while they are present in the room.

## 2022-06-10 NOTE — Clinical Note
6/26/2022    Patient: Yamile Dawkins   YOB: 1950   Date of Visit: 6/10/2022     Amada Ramirez MD  [facility]    Dear Amada Ramirez MD,      Thank you for referring Ms. Sharene Brunner to [address] for evaluation. My notes for this consultation are attached. If you have questions, please do not hesitate to call me. I look forward to following your patient along with you.       Sincerely,    Avelino Joe MD

## 2022-06-16 RX ORDER — FAMOTIDINE 20 MG/1
20 TABLET, FILM COATED ORAL
Qty: 180 TABLET | Refills: 1 | Status: SHIPPED | OUTPATIENT
Start: 2022-06-16 | End: 2022-07-29

## 2022-06-16 NOTE — TELEPHONE ENCOUNTER
Last Visit: 1/10/22 MD Amanda Avila  Next Appointment: None  Previous Refill Encounter(s): 1/20/22 180 + 1    Requested Prescriptions     Pending Prescriptions Disp Refills    famotidine (PEPCID) 20 mg tablet 180 Tablet 1     Sig: Take 1 Tablet by mouth two (2) times daily as needed (Reflux).      For Benja Lau in place:    Recommendation Provided To:    Intervention Detail: New Rx: 1, reason: Patient Preference   Gap Closed?:    Intervention Accepted By:   Arely Time Spent (min): 1

## 2022-06-30 ENCOUNTER — DOCUMENTATION ONLY (OUTPATIENT)
Dept: HEMATOLOGY | Age: 72
End: 2022-06-30

## 2022-07-06 ENCOUNTER — TRANSCRIBE ORDER (OUTPATIENT)
Dept: SCHEDULING | Age: 72
End: 2022-07-06

## 2022-07-06 ENCOUNTER — TELEPHONE (OUTPATIENT)
Dept: HEMATOLOGY | Age: 72
End: 2022-07-06

## 2022-07-06 DIAGNOSIS — R10.84 ABDOMINAL PAIN, GENERALIZED: ICD-10-CM

## 2022-07-06 DIAGNOSIS — K21.9 GERD (GASTROESOPHAGEAL REFLUX DISEASE): ICD-10-CM

## 2022-07-06 DIAGNOSIS — R10.11 RUQ ABDOMINAL PAIN: Primary | ICD-10-CM

## 2022-07-08 ENCOUNTER — VIRTUAL VISIT (OUTPATIENT)
Dept: FAMILY MEDICINE CLINIC | Age: 72
End: 2022-07-08
Payer: MEDICARE

## 2022-07-08 DIAGNOSIS — M79.10 MYALGIA: Primary | ICD-10-CM

## 2022-07-08 PROCEDURE — 1101F PT FALLS ASSESS-DOCD LE1/YR: CPT | Performed by: FAMILY MEDICINE

## 2022-07-08 PROCEDURE — 1123F ACP DISCUSS/DSCN MKR DOCD: CPT | Performed by: FAMILY MEDICINE

## 2022-07-08 PROCEDURE — G9899 SCRN MAM PERF RSLTS DOC: HCPCS | Performed by: FAMILY MEDICINE

## 2022-07-08 PROCEDURE — G8427 DOCREV CUR MEDS BY ELIG CLIN: HCPCS | Performed by: FAMILY MEDICINE

## 2022-07-08 PROCEDURE — G9717 DOC PT DX DEP/BP F/U NT REQ: HCPCS | Performed by: FAMILY MEDICINE

## 2022-07-08 PROCEDURE — G8756 NO BP MEASURE DOC: HCPCS | Performed by: FAMILY MEDICINE

## 2022-07-08 PROCEDURE — 99213 OFFICE O/P EST LOW 20 MIN: CPT | Performed by: FAMILY MEDICINE

## 2022-07-08 PROCEDURE — 3017F COLORECTAL CA SCREEN DOC REV: CPT | Performed by: FAMILY MEDICINE

## 2022-07-08 PROCEDURE — G8399 PT W/DXA RESULTS DOCUMENT: HCPCS | Performed by: FAMILY MEDICINE

## 2022-07-08 PROCEDURE — 1090F PRES/ABSN URINE INCON ASSESS: CPT | Performed by: FAMILY MEDICINE

## 2022-07-08 NOTE — PROGRESS NOTES
Zeeshan Nurse, was evaluated through a synchronous (real-time) audio-video encounter. The patient (or guardian if applicable) is aware that this is a billable service, which includes applicable co-pays. This Virtual Visit was conducted with patient's (and/or legal guardian's) consent. The visit was conducted pursuant to the emergency declaration under the [facility], [facility] and the [employer] and [employer] General Act. Patient identification was verified, and a caregiver was present when appropriate. The patient was located at: Home: [address] [address]  The provider was located at: Facility (Appt Department): [facility]     Kg Hinds MD    [phone]  Subjective:   Zeeshan  is a 67 y.o. F who was seen for:    Reports 3 day hx of cold sweats, charley horses in her legs at night. Has not taken her temperature. Had Palak in February. Currently seeing hepatology for fatty liver and waiting for HIDA scan with GI for ongoing abdominal pain. Current Outpatient Medications:     famotidine (PEPCID) 20 mg tablet, Take 1 Tablet by mouth two (2) times daily as needed (Reflux). , Disp: 180 Tablet, Rfl: 1    hydroCHLOROthiazide (MICROZIDE) 12.5 mg capsule, Take 1 Capsule by mouth daily. , Disp: 90 Capsule, Rfl: 1    buPROPion SR (Wellbutrin SR) 150 mg SR tablet, Take 150 mg by mouth daily. , Disp: , Rfl:     omeprazole (PRILOSEC) 20 mg capsule, , Disp: , Rfl:     simvastatin (ZOCOR) 40 mg tablet, TAKE 1 TABLET EVERY NIGHT, Disp: 90 Tablet, Rfl: 3    haloperidol (HALDOL) 1 mg tablet, Take 1 mg by mouth every other day., Disp: , Rfl:     CALCIUM 600 WITH VITAMIN D3 600 mg(1,500mg) -400 unit chew, , Disp: , Rfl:     DOCOSAHEXANOIC ACID/EPA (FISH OIL PO), Take 1 Cap by mouth two (2) times a day., Disp: , Rfl:     aspirin (ASPIRIN) 325 mg tablet, Take 1 Tab by mouth daily. , Disp: 365 Tab, Rfl: 0    FLUoxetine (PROZAC) 40 mg capsule, Take 40 mg by mouth daily. , Disp: , Rfl:     loratadine 10 mg Cap, Take 10 mg by mouth daily. , Disp: , Rfl:     Allergies   Allergen Reactions    Bee Venom Protein (Honey Bee) Hives    Voltaren [Diclofenac Sodium] Diarrhea       Review of Systems   Constitutional: Positive for chills. Negative for fever, malaise/fatigue and weight loss. Respiratory: Negative for cough, hemoptysis, shortness of breath and wheezing. Cardiovascular: Negative for chest pain, palpitations, leg swelling and PND. Gastrointestinal: Negative for abdominal pain, constipation, diarrhea, nausea and vomiting. Musculoskeletal: Positive for myalgias. Objective:   No flowsheet data found. Constitutional: [x] Appears well-developed and well-nourished [x] No apparent distress      [] Abnormal -     Mental status: [x] Alert and awake  [x] Oriented to person/place/time [x] Able to follow commands    [] Abnormal -     Eyes:   EOM    [x]  Normal    [] Abnormal -   Sclera  [x]  Normal    [] Abnormal -          Discharge []  None visible   [] Abnormal -     HENT: [x] Normocephalic, atraumatic  [] Abnormal -   [] Mouth/Throat: Mucous membranes are moist    Neck: [x] No visualized mass [] Abnormal -     Pulmonary/Chest: [x] Respiratory effort normal   [x] No visualized signs of difficulty breathing or respiratory distress        [] Abnormal -         Skin:        [x] No significant exanthematous lesions or discoloration noted on facial skin         [] Abnormal -            Psychiatric:       [x] Normal Affect [] Abnormal -        [x] No Hallucinations    Other pertinent observable physical exam findings:    Assessment & Plan:   Calista Monreal is a 67 y.o. female who presents today for:    1. Myalgia  Advised pt to take home COVID test and monitor temperatures. No other URI symptoms. Would hydrate with lots of water.  Advised that if symptoms worsen, follow up in clinic or urgent care if after hours. Will also schedule AWV as she is overdue. There are no discontinued medications. Treatment risks/benefits/costs/interactions/alternatives discussed with patient. Advised patient to call back or return to office if symptoms worsen/change/persist. If patient cannot reach us or should anything more severe/urgent arise he/she should proceed directly to the nearest emergency department. Discussed expected course/resolution/complications of diagnosis in detail with patient. Patient expressed understanding with the diagnosis and plan. This dictation may have been completed with Dragon, the [employer] voice recognition software. Unanticipated grammatical, syntax, homophones, and other interpretive errors are sometimes inadvertently transcribed by the computer software. Please disregard any errors that have escaped final proofreading. Yareli Abebe M.D.

## 2022-07-18 ENCOUNTER — HOSPITAL ENCOUNTER (OUTPATIENT)
Dept: NUCLEAR MEDICINE | Age: 72
Discharge: HOME | End: 2022-07-18
Attending: INTERNAL MEDICINE
Payer: MEDICARE

## 2022-07-18 DIAGNOSIS — K21.9 GERD (GASTROESOPHAGEAL REFLUX DISEASE): ICD-10-CM

## 2022-07-18 DIAGNOSIS — R10.84 ABDOMINAL PAIN, GENERALIZED: ICD-10-CM

## 2022-07-18 DIAGNOSIS — R10.11 RUQ ABDOMINAL PAIN: ICD-10-CM

## 2022-07-18 PROCEDURE — 78226 HEPATOBILIARY SYSTEM IMAGING: CPT

## 2022-07-18 RX ORDER — KIT FOR THE PREPARATION OF TECHNETIUM TC 99M MEBROFENIN 45 MG/10ML
4.9 INJECTION, POWDER, LYOPHILIZED, FOR SOLUTION INTRAVENOUS
Status: COMPLETED | OUTPATIENT
Start: 2022-07-18 | End: 2022-07-18

## 2022-07-18 RX ADMIN — KIT FOR THE PREPARATION OF TECHNETIUM TC 99M MEBROFENIN 4.9 MILLICURIE: 45 INJECTION, POWDER, LYOPHILIZED, FOR SOLUTION INTRAVENOUS at 10:50

## 2022-07-22 ENCOUNTER — OFFICE VISIT (OUTPATIENT)
Dept: FAMILY MEDICINE CLINIC | Age: 72
End: 2022-07-22
Payer: MEDICARE

## 2022-07-22 VITALS
HEIGHT: 62 IN | RESPIRATION RATE: 16 BRPM | SYSTOLIC BLOOD PRESSURE: 104 MMHG | BODY MASS INDEX: 35.77 KG/M2 | OXYGEN SATURATION: 97 % | TEMPERATURE: 97.9 F | WEIGHT: 194.4 LBS | HEART RATE: 56 BPM | DIASTOLIC BLOOD PRESSURE: 62 MMHG

## 2022-07-22 DIAGNOSIS — R68.89 COLD SWEAT: ICD-10-CM

## 2022-07-22 DIAGNOSIS — N64.4 BREAST TENDERNESS: ICD-10-CM

## 2022-07-22 DIAGNOSIS — M79.10 MYALGIA: ICD-10-CM

## 2022-07-22 DIAGNOSIS — E78.5 HYPERLIPIDEMIA, UNSPECIFIED HYPERLIPIDEMIA TYPE: ICD-10-CM

## 2022-07-22 DIAGNOSIS — R73.03 PREDIABETES: ICD-10-CM

## 2022-07-22 DIAGNOSIS — R10.11 RIGHT UPPER QUADRANT ABDOMINAL PAIN: ICD-10-CM

## 2022-07-22 DIAGNOSIS — R94.8 ABNORMAL BILIARY HIDA SCAN: ICD-10-CM

## 2022-07-22 DIAGNOSIS — M85.80 OSTEOPENIA, UNSPECIFIED LOCATION: ICD-10-CM

## 2022-07-22 DIAGNOSIS — Z00.00 ENCOUNTER FOR MEDICARE ANNUAL WELLNESS EXAM: Primary | ICD-10-CM

## 2022-07-22 LAB
25(OH)D3 SERPL-MCNC: 40 NG/ML (ref 30–100)
ALBUMIN SERPL-MCNC: 4.1 G/DL (ref 3.5–5)
ALBUMIN/GLOB SERPL: 1.4 {RATIO} (ref 1.1–2.2)
ALP SERPL-CCNC: 69 U/L (ref 45–117)
ALT SERPL-CCNC: 33 U/L (ref 12–78)
ANION GAP SERPL CALC-SCNC: 4 MMOL/L (ref 5–15)
AST SERPL-CCNC: 22 U/L (ref 15–37)
BASOPHILS # BLD: 0.1 K/UL (ref 0–0.1)
BASOPHILS NFR BLD: 1 % (ref 0–1)
BILIRUB SERPL-MCNC: 0.8 MG/DL (ref 0.2–1)
BUN SERPL-MCNC: 20 MG/DL (ref 6–20)
BUN/CREAT SERPL: 18 (ref 12–20)
CALCIUM SERPL-MCNC: 9.6 MG/DL (ref 8.5–10.1)
CHLORIDE SERPL-SCNC: 103 MMOL/L (ref 97–108)
CHOLEST SERPL-MCNC: 111 MG/DL
CK SERPL-CCNC: 100 U/L (ref 26–192)
CO2 SERPL-SCNC: 31 MMOL/L (ref 21–32)
CREAT SERPL-MCNC: 1.12 MG/DL (ref 0.55–1.02)
DIFFERENTIAL METHOD BLD: NORMAL
EOSINOPHIL # BLD: 0.2 K/UL (ref 0–0.4)
EOSINOPHIL NFR BLD: 3 % (ref 0–7)
ERYTHROCYTE [DISTWIDTH] IN BLOOD BY AUTOMATED COUNT: 12.3 % (ref 11.5–14.5)
EST. AVERAGE GLUCOSE BLD GHB EST-MCNC: 103 MG/DL
FERRITIN SERPL-MCNC: 284 NG/ML (ref 26–388)
GLOBULIN SER CALC-MCNC: 3 G/DL (ref 2–4)
GLUCOSE SERPL-MCNC: 97 MG/DL (ref 65–100)
HBA1C MFR BLD: 5.2 % (ref 4–5.6)
HCT VFR BLD AUTO: 43 % (ref 35–47)
HDLC SERPL-MCNC: 43 MG/DL
HDLC SERPL: 2.6 {RATIO} (ref 0–5)
HGB BLD-MCNC: 14.4 G/DL (ref 11.5–16)
IMM GRANULOCYTES # BLD AUTO: 0 K/UL (ref 0–0.04)
IMM GRANULOCYTES NFR BLD AUTO: 0 % (ref 0–0.5)
IRON SATN MFR SERPL: 27 % (ref 20–50)
IRON SERPL-MCNC: 80 UG/DL (ref 35–150)
LDLC SERPL CALC-MCNC: 52.8 MG/DL (ref 0–100)
LIPASE SERPL-CCNC: 90 U/L (ref 73–393)
LYMPHOCYTES # BLD: 1.8 K/UL (ref 0.8–3.5)
LYMPHOCYTES NFR BLD: 23 % (ref 12–49)
MCH RBC QN AUTO: 29.9 PG (ref 26–34)
MCHC RBC AUTO-ENTMCNC: 33.5 G/DL (ref 30–36.5)
MCV RBC AUTO: 89.4 FL (ref 80–99)
MONOCYTES # BLD: 0.7 K/UL (ref 0–1)
MONOCYTES NFR BLD: 9 % (ref 5–13)
NEUTS SEG # BLD: 5 K/UL (ref 1.8–8)
NEUTS SEG NFR BLD: 64 % (ref 32–75)
NRBC # BLD: 0 K/UL (ref 0–0.01)
NRBC BLD-RTO: 0 PER 100 WBC
PLATELET # BLD AUTO: 233 K/UL (ref 150–400)
PMV BLD AUTO: 10.6 FL (ref 8.9–12.9)
POTASSIUM SERPL-SCNC: 3.9 MMOL/L (ref 3.5–5.1)
PROT SERPL-MCNC: 7.1 G/DL (ref 6.4–8.2)
RBC # BLD AUTO: 4.81 M/UL (ref 3.8–5.2)
SODIUM SERPL-SCNC: 138 MMOL/L (ref 136–145)
T4 SERPL-MCNC: 9.2 UG/DL (ref 4.8–13.9)
TIBC SERPL-MCNC: 301 UG/DL (ref 250–450)
TRIGL SERPL-MCNC: 76 MG/DL (ref ?–150)
TSH SERPL DL<=0.05 MIU/L-ACNC: 2.85 UIU/ML (ref 0.36–3.74)
VLDLC SERPL CALC-MCNC: 15.2 MG/DL
WBC # BLD AUTO: 7.8 K/UL (ref 3.6–11)

## 2022-07-22 PROCEDURE — 1123F ACP DISCUSS/DSCN MKR DOCD: CPT | Performed by: FAMILY MEDICINE

## 2022-07-22 PROCEDURE — G8417 CALC BMI ABV UP PARAM F/U: HCPCS | Performed by: FAMILY MEDICINE

## 2022-07-22 PROCEDURE — G9899 SCRN MAM PERF RSLTS DOC: HCPCS | Performed by: FAMILY MEDICINE

## 2022-07-22 PROCEDURE — 3017F COLORECTAL CA SCREEN DOC REV: CPT | Performed by: FAMILY MEDICINE

## 2022-07-22 PROCEDURE — G0439 PPPS, SUBSEQ VISIT: HCPCS | Performed by: FAMILY MEDICINE

## 2022-07-22 PROCEDURE — G8427 DOCREV CUR MEDS BY ELIG CLIN: HCPCS | Performed by: FAMILY MEDICINE

## 2022-07-22 PROCEDURE — G9717 DOC PT DX DEP/BP F/U NT REQ: HCPCS | Performed by: FAMILY MEDICINE

## 2022-07-22 PROCEDURE — G8536 NO DOC ELDER MAL SCRN: HCPCS | Performed by: FAMILY MEDICINE

## 2022-07-22 PROCEDURE — 99214 OFFICE O/P EST MOD 30 MIN: CPT | Performed by: FAMILY MEDICINE

## 2022-07-22 PROCEDURE — G8399 PT W/DXA RESULTS DOCUMENT: HCPCS | Performed by: FAMILY MEDICINE

## 2022-07-22 PROCEDURE — 93000 ELECTROCARDIOGRAM COMPLETE: CPT | Performed by: FAMILY MEDICINE

## 2022-07-22 PROCEDURE — G8752 SYS BP LESS 140: HCPCS | Performed by: FAMILY MEDICINE

## 2022-07-22 PROCEDURE — 1090F PRES/ABSN URINE INCON ASSESS: CPT | Performed by: FAMILY MEDICINE

## 2022-07-22 PROCEDURE — G8754 DIAS BP LESS 90: HCPCS | Performed by: FAMILY MEDICINE

## 2022-07-22 PROCEDURE — 1101F PT FALLS ASSESS-DOCD LE1/YR: CPT | Performed by: FAMILY MEDICINE

## 2022-07-22 NOTE — PROGRESS NOTES
This is the Subsequent Medicare Annual Wellness Exam, performed 12 months or more after the Initial AWV or the last Subsequent AWV    I have reviewed the patient's medical history in detail and updated the computerized patient record. Assessment/Plan   Education and counseling provided:  Are appropriate based on today's review and evaluation    1. Encounter for Medicare annual wellness exam  2. Right upper quadrant abdominal pain  -     REFERRAL TO GENERAL SURGERY  -     LIPASE; Future  3. Abnormal biliary HIDA scan  -     REFERRAL TO GENERAL SURGERY  4. Prediabetes  -     HEMOGLOBIN A1C WITH EAG; Future  5. Breast tenderness  -     AMB POC EKG ROUTINE W/ 12 LEADS, INTER & REP  -     PANCHO 3D VICTOR MANUEL W MAMMO BI DX INCL CAD; Future  6. Osteopenia, unspecified location  -     VITAMIN D, 25 HYDROXY; Future  7. Hyperlipidemia, unspecified hyperlipidemia type  -     METABOLIC PANEL, COMPREHENSIVE; Future  -     LIPID PANEL; Future  8. Cold sweat  -     CBC WITH AUTOMATED DIFF; Future  -     FERRITIN; Future  -     IRON PROFILE; Future  -     TSH 3RD GENERATION; Future  -     T4 (THYROXINE); Future  9. Myalgia  -     CK; Future       Depression Risk Factor Screening     3 most recent PHQ Screens 7/19/2022   Little interest or pleasure in doing things Several days   Feeling down, depressed, irritable, or hopeless Several days   Total Score PHQ 2 2       Alcohol & Drug Abuse Risk Screen   Do you average more than 1 drink per night or more than 7 drinks a week?: (P) No  On any one occasion in the past three months have you had more than 3 drinks containing alcohol?: (P) No          Functional Ability and Level of Safety   Hearing:  Hearing: (P) additional comments below  Hearing comments: (P) My hearing is selective to sub-standard. Activities of Daily Living:   The home contains: (P) no safety equipment  Functional ADLs: (P) Patient needs help with self care  Patient needs help with: (P) phone, transportation Ambulation:  Patient ambulates: (P) with mild difficulty  Walking is difficult due to: (P) pain   Fall Risk:  Fall Risk Assessment, last 12 mths 6/10/2022   Able to walk? Yes   Fall in past 12 months? 0   Do you feel unsteady? 0   Are you worried about falling 0   Number of falls in past 12 months -   Fall with injury? -     Abuse Screen:  Do you ever feel afraid of your partner?: (P) No  Are you in a relationship with someone who physically or mentally threatens you?: (P) No  Is it safe for you to go home?: (P) Yes      Cognitive Screening   Has your family/caregiver stated any concerns about your memory?: (P) Yes       Health Maintenance Due     Health Maintenance Due   Topic Date Due    Shingrix Vaccine Age 49> (1 of 2) Never done    Pneumococcal 65+ years (2 - PCV) 07/22/2017    COVID-19 Vaccine (4 - Booster for [employer] series) 03/19/2022       Patient Care Team   Patient Care Team:  Veronica Walton MD as PCP - General (Family Medicine)  Veronica Walton MD as PCP - [facility]  Dearkita Burrows MD (Psychiatry)  Mariaelena Marx MD (Cardiovascular Disease Physician)  Prabhjot Michelle MD as Physician (Gastroenterology)  Jojo Hood DO (Dermatology Physician)  Thomas Woodall MD as Physician (Ophthalmology)    History     Patient Active Problem List   Diagnosis Code    Insomnia G47.00    Depression F32. A    Esophageal reflux K21.9    Obesity, unspecified E66.9    Allergic rhinitis due to other allergen J30.89    Schizophrenia (HCC) F20.9    Anxiety F41.9    Arthritis M19.90    Asthma J45.909    Hypertension I10    IBS (irritable bowel syndrome) K58.9    Osteopenia M85.80    Prediabetes R73.03    Skin cancer C44.90    History of CVA (cerebrovascular accident) Z86.73    PFO (patent foramen ovale) Q21.1    AVM (arteriovenous malformation) brain Q28.2    Hyperlipidemia E78.5    MOSS (nonalcoholic steatohepatitis) K75.81     Past Medical History:   Diagnosis Date    Allergic rhinitis due to other allergen     Anxiety     Arthritis     Asthma     in younger years - no inhaler use    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    MRI 2013; stable ischemic changes; hx of CVA    Depression 9/23/2009    Esophageal reflux     HTN, goal below 140/90     Hyperlipidemia 9/23/2009    IBS (irritable bowel syndrome)     Insomnia     Obesity, unspecified     Osteopenia     started Fosamax therapy 4/2015    PFO (patent foramen ovale)     reportedly dx'ed with CVA in 2012    Prediabetes     Schizophrenia ([facility] [address]     Skin cancer     squamous cell skin cancer - removed    Sun-damaged skin       Past Surgical History:   Procedure Laterality Date    COLONOSCOPY N/A 3/28/2022    COLONOSCOPY performed by Dany Apley., MD at [facility] ENDOSCOPY    HX ADENOIDECTOMY      HX CATARACT REMOVAL  06/2020    HX HEENT Bilateral     surgery for lazy eye    HX HYSTERECTOMY  1984    endometriosis    HX ORTHOPAEDIC      bone spur removed from right foot    HX TONSILLECTOMY       Current Outpatient Medications   Medication Sig Dispense Refill    famotidine (PEPCID) 20 mg tablet Take 1 Tablet by mouth two (2) times daily as needed (Reflux). (Patient taking differently: Take 20 mg by mouth in the morning.) 180 Tablet 1    hydroCHLOROthiazide (MICROZIDE) 12.5 mg capsule Take 1 Capsule by mouth daily. 90 Capsule 1    buPROPion SR (WELLBUTRIN SR) 150 mg SR tablet Take 150 mg by mouth daily. omeprazole (PRILOSEC) 20 mg capsule       simvastatin (ZOCOR) 40 mg tablet TAKE 1 TABLET EVERY NIGHT 90 Tablet 3    haloperidol (HALDOL) 1 mg tablet Take 1 mg by mouth every other day. CALCIUM 600 WITH VITAMIN D3 600 mg(1,500mg) -400 unit chew       DOCOSAHEXANOIC ACID/EPA (FISH OIL PO) Take 1 Cap by mouth two (2) times a day. aspirin (ASPIRIN) 325 mg tablet Take 1 Tab by mouth daily. 365 Tab 0    FLUoxetine (PROzac) 40 mg capsule Take 40 mg by mouth daily. loratadine 10 mg Cap Take 10 mg by mouth daily. Allergies   Allergen Reactions    Bee Venom Protein (Honey Bee) Hives    Voltaren [Diclofenac Sodium] Diarrhea       Family History   Problem Relation Age of Onset    Stroke Mother     Heart Disease Father     Other Sister         benign brain tumor/arthritis    Thyroid Disease Sister         goiter    Diabetes Sister      Social History     Tobacco Use    Smoking status: Never    Smokeless tobacco: Never   Substance Use Topics    Alcohol use: No         Darinel Fang MD

## 2022-07-22 NOTE — PROGRESS NOTES
Patient Name: Janelle Rollins   MRN: [id number]    Khloe William is a 67 y.o. female who presents with the following:     Continues to have ongoing abdominal pain, mostly located on the right side. Recently had HIDA scan which showed gallbladder ejection fraction of 9%. She was referred by GI to a surgeon but is awaiting appointment. She also reports bilateral breast pain, mostly left-sided. She had a screening mammogram done in January which was normal.  Also has right-sided neck and upper back pain. Often has cold sweats and muscle aches throughout the day for the past few weeks. Review of Systems   Constitutional:  Negative for fever, malaise/fatigue and weight loss. Respiratory:  Negative for cough, hemoptysis, shortness of breath and wheezing. Cardiovascular:  Negative for chest pain, palpitations, leg swelling and PND. Gastrointestinal:  Positive for abdominal pain. Negative for blood in stool, constipation, diarrhea, melena, nausea and vomiting. Musculoskeletal:  Positive for myalgias and neck pain. The patient's medications, allergies, past medical history, surgical history, family history and social history were reviewed and updated where appropriate. Current Outpatient Medications:     famotidine (PEPCID) 20 mg tablet, Take 1 Tablet by mouth two (2) times daily as needed (Reflux). (Patient taking differently: Take 20 mg by mouth in the morning.), Disp: 180 Tablet, Rfl: 1    hydroCHLOROthiazide (MICROZIDE) 12.5 mg capsule, Take 1 Capsule by mouth daily. , Disp: 90 Capsule, Rfl: 1    buPROPion SR (WELLBUTRIN SR) 150 mg SR tablet, Take 150 mg by mouth daily. , Disp: , Rfl:     omeprazole (PRILOSEC) 20 mg capsule, , Disp: , Rfl:     simvastatin (ZOCOR) 40 mg tablet, TAKE 1 TABLET EVERY NIGHT, Disp: 90 Tablet, Rfl: 3    haloperidol (HALDOL) 1 mg tablet, Take 1 mg by mouth every other day., Disp: , Rfl:     CALCIUM 600 WITH VITAMIN D3 600 mg(1,500mg) -400 unit chew, , Disp: , Rfl: DOCOSAHEXANOIC ACID/EPA (FISH OIL PO), Take 1 Cap by mouth two (2) times a day., Disp: , Rfl:     aspirin (ASPIRIN) 325 mg tablet, Take 1 Tab by mouth daily. , Disp: 365 Tab, Rfl: 0    FLUoxetine (PROzac) 40 mg capsule, Take 40 mg by mouth daily. , Disp: , Rfl:     loratadine 10 mg Cap, Take 10 mg by mouth daily. , Disp: , Rfl:     Allergies   Allergen Reactions    Bee Venom Protein (Honey Bee) Hives    Voltaren [Diclofenac Sodium] Diarrhea       OBJECTIVE    Visit Vitals  /62 (BP 1 Location: Left upper arm, BP Patient Position: Sitting, BP Cuff Size: Small adult)   Pulse (!) 56   Temp 97.9 °F (36.6 °C) (Temporal)   Resp 16   Ht 5' 2\" (1.575 m)   Wt 194 lb 6.4 oz (88.2 kg)   SpO2 97%   BMI 35.56 kg/m²       Physical Exam  Vitals and nursing note reviewed. Constitutional:       General: She is not in acute distress. Appearance: Normal appearance. She is not toxic-appearing. HENT:      Head: Normocephalic and atraumatic. Eyes:      Pupils: Pupils are equal, round, and reactive to light. Pulmonary:      Effort: Pulmonary effort is normal.   Abdominal:      Palpations: There is no mass. Tenderness: There is abdominal tenderness (RUQ). There is no right CVA tenderness, left CVA tenderness, guarding or rebound. Musculoskeletal:         General: Normal range of motion. Skin:     General: Skin is warm and dry. Neurological:      Mental Status: She is alert. Mental status is at baseline. Psychiatric:         Mood and Affect: Mood normal.         Behavior: Behavior normal.   Breasts: left breast normal without mass, skin or nipple changes or axillary nodes but pt reports tenderness along left upper outer quadrant; right abnormal mass palpable along outer quadrant. ASSESSMENT AND PLAN  Talya Early is a 67 y.o. female who presents today for:    1. Encounter for Medicare annual wellness exam    2. Right upper quadrant abdominal pain  Recommend general surgery evaluation.   - REFERRAL TO GENERAL SURGERY  - LIPASE; Future  - LIPASE    3. Abnormal biliary HIDA scan  - REFERRAL TO GENERAL SURGERY    4. Prediabetes  - HEMOGLOBIN A1C WITH EAG; Future  - HEMOGLOBIN A1C WITH EAG    5. Breast tenderness  EKG sinus bradycardia, RBBB, unchanged. Will obtain diagnostic mammogram.  - AMB POC EKG ROUTINE W/ 12 LEADS, INTER & REP  - [facility] 3D VICTOR MANUEL W MAMMO BI DX INCL CAD; Future    6. Osteopenia, unspecified location  - VITAMIN D, 25 HYDROXY; Future  - VITAMIN D, 25 HYDROXY    7. Hyperlipidemia, unspecified hyperlipidemia type  - METABOLIC PANEL, COMPREHENSIVE; Future  - LIPID PANEL; Future  - LIPID PANEL  - METABOLIC PANEL, COMPREHENSIVE    8. Cold sweat  - CBC WITH AUTOMATED DIFF; Future  - FERRITIN; Future  - IRON PROFILE; Future  - TSH 3RD GENERATION; Future  - T4 (THYROXINE); Future  - T4 (THYROXINE)  - TSH 3RD GENERATION  - IRON PROFILE  - FERRITIN  - CBC WITH AUTOMATED DIFF    9. Myalgia  - CK; Future  - CK       There are no discontinued medications. Treatment risks/benefits/costs/interactions/alternatives discussed with patient. Advised patient to call back or return to office if symptoms worsen/change/persist. If patient cannot reach us or should anything more severe/urgent arise he/she should proceed directly to the nearest emergency department. Discussed expected course/resolution/complications of diagnosis in detail with patient. Patient expressed understanding with the diagnosis and plan. This dictation may have been completed with Dragon, the computer voice recognition software. Unanticipated grammatical, syntax, homophones, and other interpretive errors are sometimes inadvertently transcribed by the computer software. Please disregard any errors that have escaped final proofreading. Yareli Abebe M.D.

## 2022-07-22 NOTE — PROGRESS NOTES
Chief Complaint   Patient presents with    Annual Wellness Visit         1. Have you been to the ER, urgent care clinic since your last visit? Hospitalized since your last visit? No    2. Have you seen or consulted any other health care providers outside of the [address] [place] [address] Henri since your last visit? Include any pap smears or colon screening. No    3. For patients over 45: Has the patient had a colonoscopy? Yes - no Care Gap present     If the patient is female:    4. For patients over 40: Has the patient had a mammogram? Yes - no Care Gap present    5. For patients over 21: Has the patient had a pap smear? Yes - no Care Gap present    3 most recent PHQ Screens 7/19/2022   Little interest or pleasure in doing things Several days   Feeling down, depressed, irritable, or hopeless Several days   Total Score PHQ 2 2       Fall Risk Assessment, last 12 mths 6/10/2022   Able to walk? Yes   Fall in past 12 months? 0   Do you feel unsteady? 0   Are you worried about falling 0   Number of falls in past 12 months -   Fall with injury? -       ADL Assessment 7/22/2022   Feeding yourself No Help Needed   Getting from bed to chair No Help Needed   Getting dressed No Help Needed   Bathing or showering No Help Needed   Walk across the room (includes cane/walker) No Help Needed   Using the telphone No Help Needed   Taking your medications No Help Needed   Preparing meals No Help Needed   Managing money (expenses/bills) No Help Needed   Moderately strenuous housework (laundry) No Help Needed   Shopping for personal items (toiletries/medicines) No Help Needed   Shopping for groceries No Help Needed   Driving No Help Needed   Climbing a flight of stairs No Help Needed   Getting to places beyond walking distances No Help Needed       Abuse Screening Questionnaire 7/19/2022   Do you ever feel afraid of your partner? N   Are you in a relationship with someone who physically or mentally threatens you?  N   Is it safe for you to go home?  Tomas Necessary

## 2022-07-25 ENCOUNTER — HOSPITAL ENCOUNTER (EMERGENCY)
Age: 72
Discharge: HOME | End: 2022-07-25
Attending: EMERGENCY MEDICINE
Payer: MEDICARE

## 2022-07-25 ENCOUNTER — APPOINTMENT (OUTPATIENT)
Dept: CT IMAGING | Age: 72
End: 2022-07-25
Attending: EMERGENCY MEDICINE
Payer: MEDICARE

## 2022-07-25 VITALS
HEART RATE: 87 BPM | TEMPERATURE: 97.8 F | DIASTOLIC BLOOD PRESSURE: 77 MMHG | SYSTOLIC BLOOD PRESSURE: 187 MMHG | RESPIRATION RATE: 16 BRPM | OXYGEN SATURATION: 97 %

## 2022-07-25 DIAGNOSIS — R51.9 NONINTRACTABLE HEADACHE, UNSPECIFIED CHRONICITY PATTERN, UNSPECIFIED HEADACHE TYPE: Primary | ICD-10-CM

## 2022-07-25 LAB
ANION GAP SERPL CALC-SCNC: 4 MMOL/L (ref 5–15)
BASOPHILS # BLD: 0.1 K/UL (ref 0–0.1)
BASOPHILS NFR BLD: 1 % (ref 0–1)
BUN SERPL-MCNC: 17 MG/DL (ref 6–20)
BUN/CREAT SERPL: 16 (ref 12–20)
CALCIUM SERPL-MCNC: 9.6 MG/DL (ref 8.5–10.1)
CHLORIDE SERPL-SCNC: 103 MMOL/L (ref 97–108)
CO2 SERPL-SCNC: 30 MMOL/L (ref 21–32)
COMMENT, HOLDF: NORMAL
CREAT SERPL-MCNC: 1.09 MG/DL (ref 0.55–1.02)
DIFFERENTIAL METHOD BLD: ABNORMAL
EOSINOPHIL # BLD: 0.2 K/UL (ref 0–0.4)
EOSINOPHIL NFR BLD: 2 % (ref 0–7)
ERYTHROCYTE [DISTWIDTH] IN BLOOD BY AUTOMATED COUNT: 12.2 % (ref 11.5–14.5)
GLUCOSE SERPL-MCNC: 91 MG/DL (ref 65–100)
HCT VFR BLD AUTO: 42.4 % (ref 35–47)
HGB BLD-MCNC: 14.8 G/DL (ref 11.5–16)
IMM GRANULOCYTES # BLD AUTO: 0.1 K/UL (ref 0–0.04)
IMM GRANULOCYTES NFR BLD AUTO: 0 % (ref 0–0.5)
LYMPHOCYTES # BLD: 1.7 K/UL (ref 0.8–3.5)
LYMPHOCYTES NFR BLD: 14 % (ref 12–49)
MAGNESIUM SERPL-MCNC: 2.2 MG/DL (ref 1.6–2.4)
MCH RBC QN AUTO: 30.8 PG (ref 26–34)
MCHC RBC AUTO-ENTMCNC: 34.9 G/DL (ref 30–36.5)
MCV RBC AUTO: 88.3 FL (ref 80–99)
MONOCYTES # BLD: 0.8 K/UL (ref 0–1)
MONOCYTES NFR BLD: 6 % (ref 5–13)
NEUTS SEG # BLD: 9 K/UL (ref 1.8–8)
NEUTS SEG NFR BLD: 77 % (ref 32–75)
NRBC # BLD: 0 K/UL (ref 0–0.01)
NRBC BLD-RTO: 0 PER 100 WBC
PLATELET # BLD AUTO: 236 K/UL (ref 150–400)
PMV BLD AUTO: 9.8 FL (ref 8.9–12.9)
POTASSIUM SERPL-SCNC: 4.3 MMOL/L (ref 3.5–5.1)
RBC # BLD AUTO: 4.8 M/UL (ref 3.8–5.2)
SAMPLES BEING HELD,HOLD: NORMAL
SODIUM SERPL-SCNC: 137 MMOL/L (ref 136–145)
WBC # BLD AUTO: 11.8 K/UL (ref 3.6–11)

## 2022-07-25 PROCEDURE — 70450 CT HEAD/BRAIN W/O DYE: CPT

## 2022-07-25 PROCEDURE — 80048 BASIC METABOLIC PNL TOTAL CA: CPT

## 2022-07-25 PROCEDURE — 83735 ASSAY OF MAGNESIUM: CPT

## 2022-07-25 PROCEDURE — 85025 COMPLETE CBC W/AUTO DIFF WBC: CPT

## 2022-07-25 PROCEDURE — 72125 CT NECK SPINE W/O DYE: CPT

## 2022-07-25 PROCEDURE — 36415 COLL VENOUS BLD VENIPUNCTURE: CPT

## 2022-07-25 PROCEDURE — 99284 EMERGENCY DEPT VISIT MOD MDM: CPT

## 2022-07-25 RX ORDER — BUTALBITAL, ACETAMINOPHEN AND CAFFEINE 300; 40; 50 MG/1; MG/1; MG/1
1 CAPSULE ORAL
Qty: 12 CAPSULE | Refills: 0 | Status: SHIPPED | OUTPATIENT
Start: 2022-07-25

## 2022-07-25 RX ORDER — BUTALBITAL, ACETAMINOPHEN AND CAFFEINE 300; 40; 50 MG/1; MG/1; MG/1
1 CAPSULE ORAL
Qty: 12 CAPSULE | Refills: 0 | OUTPATIENT
Start: 2022-07-25 | End: 2022-07-25

## 2022-07-25 RX ORDER — KETOROLAC TROMETHAMINE 30 MG/ML
15 INJECTION, SOLUTION INTRAMUSCULAR; INTRAVENOUS
Status: DISCONTINUED | OUTPATIENT
Start: 2022-07-25 | End: 2022-07-25

## 2022-07-25 NOTE — PROGRESS NOTES
Dear Ms. Kash Howard,    I wanted to follow up on your recent test results: All of your labs are normal including sugar, thyroid, iron, and blood counts. Please follow up with your surgeon.

## 2022-07-25 NOTE — ED TRIAGE NOTES
She reports a headache off and on for two weeks. She says she fell and hit the backof her head two weeks ago. She says the headache is worse today. She says she also has some neck pain some lower back pain and mid back pain. She says she is to have surgery soon for her gallbladder.

## 2022-07-25 NOTE — ED PROVIDER NOTES
Headache off and on for past couple of weeks. Today, states she feels \"weird in the head. \"  Can't describe it further. No pain medication taken at home. Had a fall 2 weeks ago before the headache started. No vomiting. No fever at home.          Past Medical History:   Diagnosis Date    Allergic rhinitis due to other allergen     Anxiety     Arthritis     Asthma     in younger years - no inhaler use    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    MRI 2013; stable ischemic changes; hx of CVA    Depression 9/23/2009    Esophageal reflux     HTN, goal below 140/90     Hyperlipidemia 9/23/2009    IBS (irritable bowel syndrome)     Insomnia     Obesity, unspecified     Osteopenia     started Fosamax therapy 4/2015    PFO (patent foramen ovale)     reportedly dx'ed with CVA in 2012    Prediabetes     Schizophrenia ([facility] [address]     Skin cancer     squamous cell skin cancer - removed    Sun-damaged skin        Past Surgical History:   Procedure Laterality Date    COLONOSCOPY N/A 3/28/2022    COLONOSCOPY performed by Ana Cristina Aparicio MD at [facility] ENDOSCOPY    HX ADENOIDECTOMY      HX CATARACT REMOVAL  06/2020    HX HEENT Bilateral     surgery for lazy eye    HX HYSTERECTOMY  1984    endometriosis    HX ORTHOPAEDIC      bone spur removed from right foot    HX TONSILLECTOMY           Family History:   Problem Relation Age of Onset    Stroke Mother     Heart Disease Father     Other Sister         benign brain tumor/arthritis    Thyroid Disease Sister         goiter    Diabetes Sister        Social History     Socioeconomic History    Marital status:      Spouse name: Not on file    Number of children: Not on file    Years of education: Not on file    Highest education level: Not on file   Occupational History    Not on file   Tobacco Use    Smoking status: Never    Smokeless tobacco: Never   Vaping Use    Vaping Use: Never used   Substance and Sexual Activity    Alcohol use: No    Drug use: No    Sexual activity: Yes     Partners: Male   Other Topics Concern     Service No    Blood Transfusions Yes     Comment: 1980's    Caffeine Concern No    Occupational Exposure No    Hobby Hazards No    Sleep Concern No    Stress Concern No    Weight Concern No    Special Diet No    Back Care No    Exercise No    Bike Helmet No    Seat Belt Yes    Self-Exams Yes     Comment: not often   Social History Narrative    Not on file     Social Determinants of Health     Financial Resource Strain: Not on file   Food Insecurity: Not on file   Transportation Needs: Not on file   Physical Activity: Not on file   Stress: Not on file   Social Connections: Not on file   Intimate Partner Violence: Not on file   Housing Stability: Not on file         ALLERGIES: Bee venom protein (honey bee) and Voltaren [diclofenac sodium]    Review of Systems   Constitutional:  Negative for fever. HENT:  Negative for facial swelling. Eyes:  Negative for visual disturbance. Respiratory:  Negative for chest tightness. Cardiovascular:  Negative for chest pain. Gastrointestinal:  Negative for abdominal pain. Genitourinary:  Negative for difficulty urinating and dysuria. Musculoskeletal:  Negative for arthralgias. Skin:  Negative for rash. Neurological:  Positive for headaches. Hematological:  Negative for adenopathy. Psychiatric/Behavioral:  Negative for suicidal ideas. Vitals:    07/25/22 1606   BP: (!) 187/77   Pulse: 87   Resp: 16   Temp: 97.8 °F (36.6 °C)   SpO2: 97%            Physical Exam  Vitals and nursing note reviewed. Constitutional:       General: She is not in acute distress. Appearance: She is well-developed. HENT:      Head: Normocephalic and atraumatic. Eyes:      General: No scleral icterus. Conjunctiva/sclera: Conjunctivae normal.      Pupils: Pupils are equal, round, and reactive to light. Cardiovascular:      Rate and Rhythm: Normal rate. Heart sounds: No murmur heard.   Pulmonary:      Effort: Pulmonary effort is normal. No respiratory distress. Abdominal:      General: There is no distension. Musculoskeletal:         General: Normal range of motion. Cervical back: Normal range of motion and neck supple. Skin:     General: Skin is warm and dry. Findings: No rash. Neurological:      Mental Status: She is alert and oriented to person, place, and time. MDM  Number of Diagnoses or Management Options  Nonintractable headache, unspecified chronicity pattern, unspecified headache type  Diagnosis management comments: Assessment: Patient's blood work and head CT were unremarkable. She been resting comfortably in the waiting room. I reassessed the patient focus care she is in no distress. She was unable to receive any IV fluids or pain medications due to the lack of staff available to administer these medicines. I gave the patient the option of waiting longer for staff to be able to medicate her or to be discharged home with a prescription for headache medication that she can  on the way home. She and her  opted to be discharged. I sent a prescription for Fioricet into her pharmacy of choice. She can follow-up with her primary care doctor later this week as scheduled. She should also return to the ER with any worsening symptoms.        Amount and/or Complexity of Data Reviewed  Clinical lab tests: reviewed  Tests in the radiology section of CPT®: reviewed           Procedures

## 2022-07-27 ENCOUNTER — OFFICE VISIT (OUTPATIENT)
Dept: SURGERY | Age: 72
End: 2022-07-27
Payer: MEDICARE

## 2022-07-27 VITALS
HEIGHT: 62 IN | BODY MASS INDEX: 36.07 KG/M2 | SYSTOLIC BLOOD PRESSURE: 128 MMHG | WEIGHT: 196 LBS | OXYGEN SATURATION: 95 % | DIASTOLIC BLOOD PRESSURE: 73 MMHG | HEART RATE: 63 BPM | RESPIRATION RATE: 20 BRPM | TEMPERATURE: 98.2 F

## 2022-07-27 DIAGNOSIS — K82.8 BILIARY DYSKINESIA: Primary | ICD-10-CM

## 2022-07-27 PROBLEM — N18.30 CHRONIC RENAL DISEASE, STAGE III (HCC): Status: ACTIVE | Noted: 2022-07-27

## 2022-07-27 PROCEDURE — G9899 SCRN MAM PERF RSLTS DOC: HCPCS | Performed by: SURGERY

## 2022-07-27 PROCEDURE — G8754 DIAS BP LESS 90: HCPCS | Performed by: SURGERY

## 2022-07-27 PROCEDURE — 99203 OFFICE O/P NEW LOW 30 MIN: CPT | Performed by: SURGERY

## 2022-07-27 PROCEDURE — G8399 PT W/DXA RESULTS DOCUMENT: HCPCS | Performed by: SURGERY

## 2022-07-27 PROCEDURE — G8417 CALC BMI ABV UP PARAM F/U: HCPCS | Performed by: SURGERY

## 2022-07-27 PROCEDURE — G8752 SYS BP LESS 140: HCPCS | Performed by: SURGERY

## 2022-07-27 PROCEDURE — 1123F ACP DISCUSS/DSCN MKR DOCD: CPT | Performed by: SURGERY

## 2022-07-27 PROCEDURE — 1090F PRES/ABSN URINE INCON ASSESS: CPT | Performed by: SURGERY

## 2022-07-27 PROCEDURE — G9717 DOC PT DX DEP/BP F/U NT REQ: HCPCS | Performed by: SURGERY

## 2022-07-27 PROCEDURE — G8427 DOCREV CUR MEDS BY ELIG CLIN: HCPCS | Performed by: SURGERY

## 2022-07-27 PROCEDURE — G8536 NO DOC ELDER MAL SCRN: HCPCS | Performed by: SURGERY

## 2022-07-27 PROCEDURE — 3017F COLORECTAL CA SCREEN DOC REV: CPT | Performed by: SURGERY

## 2022-07-27 PROCEDURE — 1101F PT FALLS ASSESS-DOCD LE1/YR: CPT | Performed by: SURGERY

## 2022-07-27 NOTE — PROGRESS NOTES
1. Have you been to the ER, urgent care clinic since your last visit? Hospitalized since your last visit? new patient    2. Have you seen or consulted any other health care providers outside of the [address] [place] [address] since your last visit? Include any pap smears or colon screening.  New patient

## 2022-07-28 NOTE — PROGRESS NOTES
Surgery Consult    Subjective:      Josefina Ortiz is a 67 y.o. female who is being seen for evaluation of abdominal pain. The pain is located in the RUQ without radiation. Pain is described as dull and aching and measures 6/10 in intensity. Onset of pain was 7 months ago. Aggravating factors include fatty foods. Alleviating factors include bland diet. Associated symptoms include nausea and indigestion. She has been evaluated by gastroenterology with multiple studies. She has also been diagnosed with nonalcoholic steatohepatitis by the hepatology service. She denies fever, chills, jaundice, or change in bowel habits.     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    Depression 09/23/2009    Hyperlipidemia 09/23/2009    Insomnia     Esophageal reflux     Obesity, unspecified     Allergic rhinitis due to other allergen      Past Medical History:   Diagnosis Date    Allergic rhinitis due to other allergen     Anxiety     Arthritis     Asthma     in younger years - no inhaler use    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    MRI 2013; stable ischemic changes; hx of CVA    Depression 9/23/2009    Esophageal reflux     HTN, goal below 140/90     Hyperlipidemia 9/23/2009    IBS (irritable bowel syndrome)     Insomnia     Obesity, unspecified     Osteopenia     started Fosamax therapy 4/2015    PFO (patent foramen ovale)     reportedly dx'ed with CVA in 2012    Prediabetes     Schizophrenia ([address]     Skin cancer     squamous cell skin cancer - removed    Sun-damaged skin       Past Surgical History:   Procedure Laterality Date    COLONOSCOPY N/A 3/28/2022    COLONOSCOPY performed by Rudolph Gonzalez MD at [facility] ENDOSCOPY    HX ADENOIDECTOMY      HX CATARACT REMOVAL  06/2020    HX HEENT Bilateral     surgery for lazy eye    HX HYSTERECTOMY  1984    endometriosis    HX ORTHOPAEDIC      bone spur removed from right foot    HX TONSILLECTOMY        Social History     Tobacco Use    Smoking status: Never    Smokeless tobacco: Never   Substance Use Topics    Alcohol use: No      Family History   Problem Relation Age of Onset    Stroke Mother     Heart Disease Father     Other Sister         benign brain tumor/arthritis    Thyroid Disease Sister         goiter    Diabetes Sister       Current Outpatient Medications   Medication Sig    butalbital-acetaminophen-caff (Fioricet) -40 mg per capsule Take 1 Capsule by mouth every four (4) hours as needed for Headache.    famotidine (PEPCID) 20 mg tablet Take 1 Tablet by mouth two (2) times daily as needed (Reflux). (Patient taking differently: Take 20 mg by mouth in the morning.)    hydroCHLOROthiazide (MICROZIDE) 12.5 mg capsule Take 1 Capsule by mouth daily. buPROPion SR (WELLBUTRIN SR) 150 mg SR tablet Take 150 mg by mouth daily. omeprazole (PRILOSEC) 20 mg capsule     simvastatin (ZOCOR) 40 mg tablet TAKE 1 TABLET EVERY NIGHT    haloperidol (HALDOL) 1 mg tablet Take 1 mg by mouth every other day. CALCIUM 600 WITH VITAMIN D3 600 mg(1,500mg) -400 unit chew     DOCOSAHEXANOIC ACID/EPA (FISH OIL PO) Take 1 Cap by mouth two (2) times a day. aspirin (ASPIRIN) 325 mg tablet Take 1 Tab by mouth daily. FLUoxetine (PROzac) 40 mg capsule Take 40 mg by mouth daily. loratadine 10 mg Cap Take 10 mg by mouth daily. No current facility-administered medications for this visit.       Allergies   Allergen Reactions    Bee Venom Protein (Honey Bee) Hives    Voltaren [Diclofenac Sodium] Diarrhea       Review of Systems:    A complete review of systems was negative except as noted in the HPI. Objective:      Visit Vitals  /73   Pulse 63   Temp 98.2 °F (36.8 °C)   Resp 20   Ht 5' 2\" (1.575 m)   Wt 196 lb (88.9 kg)   SpO2 95%   BMI 35.85 kg/m²       Physical Exam:  GENERAL: alert, cooperative, no distress, appears stated age, morbidly obese, EYE: negative, LYMPH NODES: No cervical or supraclavicular adenopathy. THROAT & NECK: normal, LUNG: clear to auscultation bilaterally, HEART: regular rate and rhythm, S1, S2 normal, no murmur. ABDOMEN: Distended, soft. Upper abdominal pain with palpation. No mass, guarding, or hernia. EXTREMITIES:  extremities normal, atraumatic, no cyanosis or edema, SKIN: Normal., NEUROLOGIC: negative    Imaging:  reviewed  CT and upper endoscopy findings ; reviewed HIDA scan results. Assessment:     Abdominal pain, suspect biliary dyskinesia. Gallbladder ejection fraction= 9%. Plan:     1. I recommend proceeding with laparoscopic cholecystectomy. 2. Discussed aspects of surgical intervention, methods, risks (including by not limited to infection, bleeding, hematoma, bile duct injury, conversion open procedure, recurrent/persistent symptoms, and perforation of the intestines or solid organs), and the risks of general anesthetic. The patient understands the risks; all questions were answered to the patient's satisfaction. 3. Patient wishes to proceed with surgery.

## 2022-07-29 RX ORDER — FAMOTIDINE 20 MG/1
20 TABLET, FILM COATED ORAL
COMMUNITY

## 2022-07-29 NOTE — PERIOP NOTES
PAT PREOP PHONE INTERVIEW COMPLETED WITH: PATIENT AND     PATIENT ADVISED TO FOLLOW CLEAR LIQUID DIET INSTRUCTION FROM SURGEON`S OFFICE. LEAVE ALL VALUABLES AT HOME; DO BRING PICTURE ID, INSURANCE CARD AND ANY COPAY; WEAR COMFORTABLE CLOTHING;  NO PERFUMES, POWDERS, LOTIONS; NO ALCOHOL 24 HOURS BEFORE OR AFTER SURGERY;  WILL NEED TO BE DRIVEN HOME BY FAMILY OR FRIEND;  AVOID TAKING NSAIDS, ASPIRIN, FISH OIL, VITAMIN E OR GLUCOSAMINE/CHONDROITIN DURING THIS TIME PRIOR TO SURGERY;  MAY TAKE TYLENOL. INSTRUCTED TO REPORT  [address] BY SURGEON'S OFFICE. INSTRUCTION PROVIDED FOR CHG SOAP.        INSTRUCTED TO BRING COVID CARD ON DAY OF SURGERY

## 2022-08-01 ENCOUNTER — HOSPITAL ENCOUNTER (OUTPATIENT)
Dept: MAMMOGRAPHY | Age: 72
Discharge: HOME | End: 2022-08-01
Attending: FAMILY MEDICINE
Payer: MEDICARE

## 2022-08-01 DIAGNOSIS — R22.31 LUMP OF AXILLA, RIGHT: ICD-10-CM

## 2022-08-01 DIAGNOSIS — N64.4 BREAST TENDERNESS: ICD-10-CM

## 2022-08-01 PROCEDURE — 76882 US LMTD JT/FCL EVL NVASC XTR: CPT

## 2022-08-01 PROCEDURE — 77062 BREAST TOMOSYNTHESIS BI: CPT

## 2022-08-02 NOTE — PROGRESS NOTES
Dear Ms. Odilon Renner,    I wanted to follow up on your recent test results: There was no abnormality in your breast tissue. The area that is palpable along your right side is likely a benign collection of fat called lipoma. If it gets bigger or more painful, please let me know and I can refer you to surgeon to have it removed. Otherwise, we can just watch it for now.

## 2022-08-03 ENCOUNTER — ANESTHESIA EVENT (OUTPATIENT)
Dept: SURGERY | Age: 72
End: 2022-08-03
Payer: MEDICARE

## 2022-08-04 ENCOUNTER — HOSPITAL ENCOUNTER (OUTPATIENT)
Age: 72
Setting detail: OUTPATIENT SURGERY
Discharge: HOME | End: 2022-08-04
Attending: SURGERY | Admitting: SURGERY
Payer: MEDICARE

## 2022-08-04 ENCOUNTER — ANESTHESIA (OUTPATIENT)
Dept: SURGERY | Age: 72
End: 2022-08-04
Payer: MEDICARE

## 2022-08-04 VITALS
HEIGHT: 62 IN | RESPIRATION RATE: 18 BRPM | OXYGEN SATURATION: 93 % | BODY MASS INDEX: 35.7 KG/M2 | TEMPERATURE: 97.6 F | HEART RATE: 71 BPM | SYSTOLIC BLOOD PRESSURE: 134 MMHG | DIASTOLIC BLOOD PRESSURE: 71 MMHG | WEIGHT: 194 LBS

## 2022-08-04 DIAGNOSIS — K82.8 BILIARY DYSKINESIA: ICD-10-CM

## 2022-08-04 DIAGNOSIS — Z90.49 S/P LAPAROSCOPIC CHOLECYSTECTOMY: Primary | ICD-10-CM

## 2022-08-04 LAB
GLUCOSE BLD STRIP.AUTO-MCNC: 108 MG/DL (ref 65–117)
SERVICE CMNT-IMP: NORMAL

## 2022-08-04 PROCEDURE — 77030008608 HC TRCR ENDOSC SMTH AMR -B: Performed by: SURGERY

## 2022-08-04 PROCEDURE — 77030002933 HC SUT MCRYL J&J -A: Performed by: SURGERY

## 2022-08-04 PROCEDURE — 74011000250 HC RX REV CODE- 250: Performed by: NURSE ANESTHETIST, CERTIFIED REGISTERED

## 2022-08-04 PROCEDURE — 77030010513 HC APPL CLP LIG J&J -C: Performed by: SURGERY

## 2022-08-04 PROCEDURE — 77030040361 HC SLV COMPR DVT MDII -B: Performed by: SURGERY

## 2022-08-04 PROCEDURE — 77030009403 HC ELECTRD ENDO MEGA -B: Performed by: SURGERY

## 2022-08-04 PROCEDURE — 77030039895 HC SYST SMK EVAC LAP COVD -B: Performed by: SURGERY

## 2022-08-04 PROCEDURE — 74011000250 HC RX REV CODE- 250: Performed by: SURGERY

## 2022-08-04 PROCEDURE — 77030037892: Performed by: SURGERY

## 2022-08-04 PROCEDURE — 82962 GLUCOSE BLOOD TEST: CPT

## 2022-08-04 PROCEDURE — 76010000149 HC OR TIME 1 TO 1.5 HR: Performed by: SURGERY

## 2022-08-04 PROCEDURE — 77030037032 HC INSRT SCIS CLICKLLINE DISP STOR -B: Performed by: SURGERY

## 2022-08-04 PROCEDURE — 77030031139 HC SUT VCRL2 J&J -A: Performed by: SURGERY

## 2022-08-04 PROCEDURE — 74011250636 HC RX REV CODE- 250/636: Performed by: NURSE ANESTHETIST, CERTIFIED REGISTERED

## 2022-08-04 PROCEDURE — 77030010507 HC ADH SKN DERMBND J&J -B: Performed by: SURGERY

## 2022-08-04 PROCEDURE — 74011250636 HC RX REV CODE- 250/636: Performed by: ANESTHESIOLOGY

## 2022-08-04 PROCEDURE — 74011250637 HC RX REV CODE- 250/637: Performed by: ANESTHESIOLOGY

## 2022-08-04 PROCEDURE — 47562 LAPAROSCOPIC CHOLECYSTECTOMY: CPT | Performed by: PHYSICIAN ASSISTANT

## 2022-08-04 PROCEDURE — 76210000017 HC OR PH I REC 1.5 TO 2 HR: Performed by: SURGERY

## 2022-08-04 PROCEDURE — 77030008756 HC TU IRR SUC STRY -B: Performed by: SURGERY

## 2022-08-04 PROCEDURE — 88304 TISSUE EXAM BY PATHOLOGIST: CPT

## 2022-08-04 PROCEDURE — 77030012770 HC TRCR OPT FX AMR -B: Performed by: SURGERY

## 2022-08-04 PROCEDURE — 74011250636 HC RX REV CODE- 250/636: Performed by: SURGERY

## 2022-08-04 PROCEDURE — 74011250636 HC RX REV CODE- 250/636

## 2022-08-04 PROCEDURE — 2709999900 HC NON-CHARGEABLE SUPPLY: Performed by: SURGERY

## 2022-08-04 PROCEDURE — 77030002895 HC DEV VASC CLOSR COVD -B: Performed by: SURGERY

## 2022-08-04 PROCEDURE — 77030035029 HC NDL INSUF VERES DISP COVD -B: Performed by: SURGERY

## 2022-08-04 PROCEDURE — 77030020829: Performed by: SURGERY

## 2022-08-04 PROCEDURE — 47562 LAPAROSCOPIC CHOLECYSTECTOMY: CPT | Performed by: SURGERY

## 2022-08-04 PROCEDURE — 74011000250 HC RX REV CODE- 250: Performed by: ANESTHESIOLOGY

## 2022-08-04 PROCEDURE — 76060000033 HC ANESTHESIA 1 TO 1.5 HR: Performed by: SURGERY

## 2022-08-04 PROCEDURE — 76210000021 HC REC RM PH II 0.5 TO 1 HR: Performed by: SURGERY

## 2022-08-04 RX ORDER — ONDANSETRON 2 MG/ML
INJECTION INTRAMUSCULAR; INTRAVENOUS AS NEEDED
Status: DISCONTINUED | OUTPATIENT
Start: 2022-08-04 | End: 2022-08-04 | Stop reason: HOSPADM

## 2022-08-04 RX ORDER — FENTANYL CITRATE 50 UG/ML
50 INJECTION, SOLUTION INTRAMUSCULAR; INTRAVENOUS AS NEEDED
Status: DISCONTINUED | OUTPATIENT
Start: 2022-08-04 | End: 2022-08-04 | Stop reason: HOSPADM

## 2022-08-04 RX ORDER — BUPIVACAINE HYDROCHLORIDE 5 MG/ML
INJECTION, SOLUTION EPIDURAL; INTRACAUDAL AS NEEDED
Status: DISCONTINUED | OUTPATIENT
Start: 2022-08-04 | End: 2022-08-04 | Stop reason: HOSPADM

## 2022-08-04 RX ORDER — MIDAZOLAM HYDROCHLORIDE 1 MG/ML
1 INJECTION, SOLUTION INTRAMUSCULAR; INTRAVENOUS AS NEEDED
Status: DISCONTINUED | OUTPATIENT
Start: 2022-08-04 | End: 2022-08-04 | Stop reason: HOSPADM

## 2022-08-04 RX ORDER — HYDRALAZINE HYDROCHLORIDE 20 MG/ML
INJECTION INTRAMUSCULAR; INTRAVENOUS
Status: COMPLETED
Start: 2022-08-04 | End: 2022-08-04

## 2022-08-04 RX ORDER — HYDROCODONE BITARTRATE AND ACETAMINOPHEN 5; 325 MG/1; MG/1
1 TABLET ORAL
Qty: 15 TABLET | Refills: 0 | Status: SHIPPED | OUTPATIENT
Start: 2022-08-04 | End: 2022-08-09

## 2022-08-04 RX ORDER — HYDROMORPHONE HYDROCHLORIDE 1 MG/ML
0.2 INJECTION, SOLUTION INTRAMUSCULAR; INTRAVENOUS; SUBCUTANEOUS
Status: DISCONTINUED | OUTPATIENT
Start: 2022-08-04 | End: 2022-08-04 | Stop reason: HOSPADM

## 2022-08-04 RX ORDER — ROPIVACAINE HYDROCHLORIDE 5 MG/ML
30 INJECTION, SOLUTION EPIDURAL; INFILTRATION; PERINEURAL AS NEEDED
Status: DISCONTINUED | OUTPATIENT
Start: 2022-08-04 | End: 2022-08-04 | Stop reason: HOSPADM

## 2022-08-04 RX ORDER — SUCCINYLCHOLINE CHLORIDE 20 MG/ML
INJECTION INTRAMUSCULAR; INTRAVENOUS AS NEEDED
Status: DISCONTINUED | OUTPATIENT
Start: 2022-08-04 | End: 2022-08-04 | Stop reason: HOSPADM

## 2022-08-04 RX ORDER — GLYCOPYRROLATE 0.2 MG/ML
0.2 INJECTION INTRAMUSCULAR; INTRAVENOUS
Status: COMPLETED | OUTPATIENT
Start: 2022-08-04 | End: 2022-08-04

## 2022-08-04 RX ORDER — ONDANSETRON 2 MG/ML
4 INJECTION INTRAMUSCULAR; INTRAVENOUS AS NEEDED
Status: DISCONTINUED | OUTPATIENT
Start: 2022-08-04 | End: 2022-08-04 | Stop reason: HOSPADM

## 2022-08-04 RX ORDER — LIDOCAINE HYDROCHLORIDE 10 MG/ML
0.1 INJECTION, SOLUTION EPIDURAL; INFILTRATION; INTRACAUDAL; PERINEURAL AS NEEDED
Status: DISCONTINUED | OUTPATIENT
Start: 2022-08-04 | End: 2022-08-04 | Stop reason: HOSPADM

## 2022-08-04 RX ORDER — ALBUTEROL SULFATE 0.83 MG/ML
2.5 SOLUTION RESPIRATORY (INHALATION) AS NEEDED
Status: DISCONTINUED | OUTPATIENT
Start: 2022-08-04 | End: 2022-08-04 | Stop reason: HOSPADM

## 2022-08-04 RX ORDER — LABETALOL HYDROCHLORIDE 5 MG/ML
INJECTION, SOLUTION INTRAVENOUS AS NEEDED
Status: DISCONTINUED | OUTPATIENT
Start: 2022-08-04 | End: 2022-08-04 | Stop reason: HOSPADM

## 2022-08-04 RX ORDER — DEXAMETHASONE SODIUM PHOSPHATE 4 MG/ML
INJECTION, SOLUTION INTRA-ARTICULAR; INTRALESIONAL; INTRAMUSCULAR; INTRAVENOUS; SOFT TISSUE AS NEEDED
Status: DISCONTINUED | OUTPATIENT
Start: 2022-08-04 | End: 2022-08-04 | Stop reason: HOSPADM

## 2022-08-04 RX ORDER — DIPHENHYDRAMINE HYDROCHLORIDE 50 MG/ML
12.5 INJECTION, SOLUTION INTRAMUSCULAR; INTRAVENOUS AS NEEDED
Status: DISCONTINUED | OUTPATIENT
Start: 2022-08-04 | End: 2022-08-04 | Stop reason: HOSPADM

## 2022-08-04 RX ORDER — LIDOCAINE HYDROCHLORIDE 20 MG/ML
INJECTION, SOLUTION EPIDURAL; INFILTRATION; INTRACAUDAL; PERINEURAL AS NEEDED
Status: DISCONTINUED | OUTPATIENT
Start: 2022-08-04 | End: 2022-08-04 | Stop reason: HOSPADM

## 2022-08-04 RX ORDER — SODIUM CHLORIDE, SODIUM LACTATE, POTASSIUM CHLORIDE, CALCIUM CHLORIDE 600; 310; 30; 20 MG/100ML; MG/100ML; MG/100ML; MG/100ML
1000 INJECTION, SOLUTION INTRAVENOUS CONTINUOUS
Status: DISCONTINUED | OUTPATIENT
Start: 2022-08-04 | End: 2022-08-04 | Stop reason: HOSPADM

## 2022-08-04 RX ORDER — ROCURONIUM BROMIDE 10 MG/ML
INJECTION, SOLUTION INTRAVENOUS AS NEEDED
Status: DISCONTINUED | OUTPATIENT
Start: 2022-08-04 | End: 2022-08-04 | Stop reason: HOSPADM

## 2022-08-04 RX ORDER — HYDROCODONE BITARTRATE AND ACETAMINOPHEN 5; 325 MG/1; MG/1
1 TABLET ORAL AS NEEDED
Status: DISCONTINUED | OUTPATIENT
Start: 2022-08-04 | End: 2022-08-04 | Stop reason: HOSPADM

## 2022-08-04 RX ORDER — PHENYLEPHRINE HCL IN 0.9% NACL 0.4MG/10ML
SYRINGE (ML) INTRAVENOUS AS NEEDED
Status: DISCONTINUED | OUTPATIENT
Start: 2022-08-04 | End: 2022-08-04 | Stop reason: HOSPADM

## 2022-08-04 RX ORDER — DEXMEDETOMIDINE HYDROCHLORIDE 100 UG/ML
INJECTION, SOLUTION INTRAVENOUS AS NEEDED
Status: DISCONTINUED | OUTPATIENT
Start: 2022-08-04 | End: 2022-08-04 | Stop reason: HOSPADM

## 2022-08-04 RX ORDER — FENTANYL CITRATE 50 UG/ML
25 INJECTION, SOLUTION INTRAMUSCULAR; INTRAVENOUS
Status: DISCONTINUED | OUTPATIENT
Start: 2022-08-04 | End: 2022-08-04 | Stop reason: HOSPADM

## 2022-08-04 RX ORDER — SODIUM CHLORIDE 9 MG/ML
25 INJECTION, SOLUTION INTRAVENOUS CONTINUOUS
Status: DISCONTINUED | OUTPATIENT
Start: 2022-08-04 | End: 2022-08-04 | Stop reason: HOSPADM

## 2022-08-04 RX ORDER — PROPOFOL 10 MG/ML
INJECTION, EMULSION INTRAVENOUS AS NEEDED
Status: DISCONTINUED | OUTPATIENT
Start: 2022-08-04 | End: 2022-08-04 | Stop reason: HOSPADM

## 2022-08-04 RX ORDER — MORPHINE SULFATE 2 MG/ML
2 INJECTION, SOLUTION INTRAMUSCULAR; INTRAVENOUS
Status: DISCONTINUED | OUTPATIENT
Start: 2022-08-04 | End: 2022-08-04 | Stop reason: HOSPADM

## 2022-08-04 RX ORDER — FENTANYL CITRATE 50 UG/ML
INJECTION, SOLUTION INTRAMUSCULAR; INTRAVENOUS AS NEEDED
Status: DISCONTINUED | OUTPATIENT
Start: 2022-08-04 | End: 2022-08-04 | Stop reason: HOSPADM

## 2022-08-04 RX ORDER — BUPIVACAINE HYDROCHLORIDE 5 MG/ML
50 INJECTION, SOLUTION EPIDURAL; INTRACAUDAL ONCE
Status: DISCONTINUED | OUTPATIENT
Start: 2022-08-04 | End: 2022-08-04 | Stop reason: HOSPADM

## 2022-08-04 RX ORDER — MIDAZOLAM HYDROCHLORIDE 1 MG/ML
0.5 INJECTION, SOLUTION INTRAMUSCULAR; INTRAVENOUS
Status: DISCONTINUED | OUTPATIENT
Start: 2022-08-04 | End: 2022-08-04 | Stop reason: HOSPADM

## 2022-08-04 RX ORDER — ACETAMINOPHEN 325 MG/1
650 TABLET ORAL ONCE
Status: COMPLETED | OUTPATIENT
Start: 2022-08-04 | End: 2022-08-04

## 2022-08-04 RX ORDER — HYDRALAZINE HYDROCHLORIDE 20 MG/ML
10 INJECTION INTRAMUSCULAR; INTRAVENOUS ONCE
Status: COMPLETED | OUTPATIENT
Start: 2022-08-04 | End: 2022-08-04

## 2022-08-04 RX ORDER — ONDANSETRON 4 MG/1
4 TABLET, ORALLY DISINTEGRATING ORAL
Qty: 10 TABLET | Refills: 0 | Status: SHIPPED | OUTPATIENT
Start: 2022-08-04

## 2022-08-04 RX ADMIN — DEXMEDETOMIDINE HYDROCHLORIDE 8 MCG: 100 INJECTION, SOLUTION, CONCENTRATE INTRAVENOUS at 12:56

## 2022-08-04 RX ADMIN — FENTANYL CITRATE 50 MCG: 50 INJECTION, SOLUTION INTRAMUSCULAR; INTRAVENOUS at 12:58

## 2022-08-04 RX ADMIN — PROPOFOL 100 MG: 10 INJECTION, EMULSION INTRAVENOUS at 11:54

## 2022-08-04 RX ADMIN — FENTANYL CITRATE 25 MCG: 0.05 INJECTION, SOLUTION INTRAMUSCULAR; INTRAVENOUS at 13:56

## 2022-08-04 RX ADMIN — FENTANYL CITRATE 50 MCG: 50 INJECTION, SOLUTION INTRAMUSCULAR; INTRAVENOUS at 12:23

## 2022-08-04 RX ADMIN — HYDRALAZINE HYDROCHLORIDE 10 MG: 20 INJECTION INTRAMUSCULAR; INTRAVENOUS at 14:48

## 2022-08-04 RX ADMIN — ACETAMINOPHEN 650 MG: 325 TABLET ORAL at 10:37

## 2022-08-04 RX ADMIN — SODIUM CHLORIDE, POTASSIUM CHLORIDE, SODIUM LACTATE AND CALCIUM CHLORIDE: 600; 310; 30; 20 INJECTION, SOLUTION INTRAVENOUS at 11:48

## 2022-08-04 RX ADMIN — FENTANYL CITRATE 50 MCG: 50 INJECTION, SOLUTION INTRAMUSCULAR; INTRAVENOUS at 11:52

## 2022-08-04 RX ADMIN — HYDROCODONE BITARTRATE AND ACETAMINOPHEN 1 TABLET: 5; 325 TABLET ORAL at 14:37

## 2022-08-04 RX ADMIN — FENTANYL CITRATE 25 MCG: 0.05 INJECTION, SOLUTION INTRAMUSCULAR; INTRAVENOUS at 14:30

## 2022-08-04 RX ADMIN — LABETALOL HYDROCHLORIDE 5 MG: 5 INJECTION INTRAVENOUS at 12:58

## 2022-08-04 RX ADMIN — ROCURONIUM BROMIDE 45 MG: 10 SOLUTION INTRAVENOUS at 12:04

## 2022-08-04 RX ADMIN — Medication 80 MCG: at 12:09

## 2022-08-04 RX ADMIN — ONDANSETRON HYDROCHLORIDE 4 MG: 2 INJECTION, SOLUTION INTRAMUSCULAR; INTRAVENOUS at 12:10

## 2022-08-04 RX ADMIN — LABETALOL HYDROCHLORIDE 5 MG: 5 INJECTION INTRAVENOUS at 13:01

## 2022-08-04 RX ADMIN — DEXAMETHASONE SODIUM PHOSPHATE 4 MG: 4 INJECTION, SOLUTION INTRAMUSCULAR; INTRAVENOUS at 12:10

## 2022-08-04 RX ADMIN — SUGAMMADEX 200 MG: 100 INJECTION, SOLUTION INTRAVENOUS at 12:53

## 2022-08-04 RX ADMIN — SODIUM CHLORIDE, POTASSIUM CHLORIDE, SODIUM LACTATE AND CALCIUM CHLORIDE 1000 ML: 600; 310; 30; 20 INJECTION, SOLUTION INTRAVENOUS at 10:14

## 2022-08-04 RX ADMIN — CEFOXITIN SODIUM 2 G: 2 POWDER, FOR SOLUTION INTRAVENOUS at 12:04

## 2022-08-04 RX ADMIN — LABETALOL HYDROCHLORIDE 5 MG: 5 INJECTION INTRAVENOUS at 12:54

## 2022-08-04 RX ADMIN — SUCCINYLCHOLINE CHLORIDE 100 MG: 20 INJECTION, SOLUTION INTRAMUSCULAR; INTRAVENOUS at 11:54

## 2022-08-04 RX ADMIN — SODIUM CHLORIDE, POTASSIUM CHLORIDE, SODIUM LACTATE AND CALCIUM CHLORIDE 1000 ML: 600; 310; 30; 20 INJECTION, SOLUTION INTRAVENOUS at 13:30

## 2022-08-04 RX ADMIN — DEXMEDETOMIDINE HYDROCHLORIDE 12 MCG: 100 INJECTION, SOLUTION, CONCENTRATE INTRAVENOUS at 12:45

## 2022-08-04 RX ADMIN — ROCURONIUM BROMIDE 5 MG: 10 SOLUTION INTRAVENOUS at 11:54

## 2022-08-04 RX ADMIN — LIDOCAINE HYDROCHLORIDE 60 MG: 20 INJECTION, SOLUTION EPIDURAL; INFILTRATION; INTRACAUDAL; PERINEURAL at 11:54

## 2022-08-04 RX ADMIN — GLYCOPYRROLATE 0.2 MG: 0.2 INJECTION, SOLUTION INTRAMUSCULAR; INTRAVENOUS at 12:07

## 2022-08-04 RX ADMIN — FENTANYL CITRATE 50 MCG: 50 INJECTION, SOLUTION INTRAMUSCULAR; INTRAVENOUS at 12:50

## 2022-08-04 RX ADMIN — ONDANSETRON HYDROCHLORIDE 4 MG: 2 SOLUTION INTRAMUSCULAR; INTRAVENOUS at 13:59

## 2022-08-04 RX ADMIN — LABETALOL HYDROCHLORIDE 5 MG: 5 INJECTION INTRAVENOUS at 13:05

## 2022-08-04 NOTE — H&P
Subjective:      Parul Diaz is a 67 y.o. female who is being seen for evaluation of abdominal pain. The pain is located in the RUQ without radiation. Pain is described as dull and aching and measures 6/10 in intensity. Onset of pain was 7 months ago. Aggravating factors include fatty foods. Alleviating factors include bland diet. Associated symptoms include nausea and indigestion. She has been evaluated by gastroenterology with multiple studies. She has also been diagnosed with nonalcoholic steatohepatitis by the hepatology service. She denies fever, chills, jaundice, or change in bowel habits.           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    Depression 09/23/2009    Hyperlipidemia 09/23/2009    Insomnia      Esophageal reflux      Obesity, unspecified      Allergic rhinitis due to other allergen             Past Medical History:   Diagnosis Date    Allergic rhinitis due to other allergen      Anxiety      Arthritis      Asthma       in younger years - no inhaler use    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     MRI 2013; stable ischemic changes; hx of CVA    Depression 9/23/2009    Esophageal reflux      HTN, goal below 140/90      Hyperlipidemia 9/23/2009    IBS (irritable bowel syndrome)      Insomnia      Obesity, unspecified      Osteopenia       started Fosamax therapy 4/2015    PFO (patent foramen ovale)       reportedly dx'ed with CVA in 2012    Prediabetes      Schizophrenia ([address]      Skin cancer       squamous cell skin cancer - removed Sun-damaged skin              Past Surgical History:   Procedure Laterality Date    COLONOSCOPY N/A 3/28/2022     COLONOSCOPY performed by Vlad Borges MD at [facility] ENDOSCOPY    HX ADENOIDECTOMY        HX CATARACT REMOVAL   06/2020    HX HEENT Bilateral       surgery for lazy eye    HX HYSTERECTOMY   1984     endometriosis    HX ORTHOPAEDIC         bone spur removed from right foot    HX TONSILLECTOMY          Social History           Tobacco Use    Smoking status: Never    Smokeless tobacco: Never   Substance Use Topics    Alcohol use: No            Family History   Problem Relation Age of Onset    Stroke Mother      Heart Disease Father      Other Sister           benign brain tumor/arthritis    Thyroid Disease Sister           goiter    Diabetes Sister             Current Outpatient Medications   Medication Sig    butalbital-acetaminophen-caff (Fioricet) -40 mg per capsule Take 1 Capsule by mouth every four (4) hours as needed for Headache.    famotidine (PEPCID) 20 mg tablet Take 1 Tablet by mouth two (2) times daily as needed (Reflux). (Patient taking differently: Take 20 mg by mouth in the morning.)    hydroCHLOROthiazide (MICROZIDE) 12.5 mg capsule Take 1 Capsule by mouth daily. buPROPion SR (WELLBUTRIN SR) 150 mg SR tablet Take 150 mg by mouth daily. omeprazole (PRILOSEC) 20 mg capsule      simvastatin (ZOCOR) 40 mg tablet TAKE 1 TABLET EVERY NIGHT    haloperidol (HALDOL) 1 mg tablet Take 1 mg by mouth every other day. CALCIUM 600 WITH VITAMIN D3 600 mg(1,500mg) -400 unit chew      DOCOSAHEXANOIC ACID/EPA (FISH OIL PO) Take 1 Cap by mouth two (2) times a day. aspirin (ASPIRIN) 325 mg tablet Take 1 Tab by mouth daily. FLUoxetine (PROzac) 40 mg capsule Take 40 mg by mouth daily. loratadine 10 mg Cap Take 10 mg by mouth daily. No current facility-administered medications for this visit.            Allergies   Allergen Reactions    Bee Venom Protein (Honey Bee) Hives Voltaren [Diclofenac Sodium] Diarrhea         Review of Systems:    A complete review of systems was negative except as noted in the HPI. Objective:      Visit Vitals  BP (!) 150/66 (BP 1 Location: Right upper arm, BP Patient Position: At rest)   Pulse 61   Temp 99.4 °F (37.4 °C)   Resp 14   Ht 5' 2\" (1.575 m)   Wt 194 lb (88 kg)   SpO2 97%   BMI 35.48 kg/m²        Physical Exam:  GENERAL: alert, cooperative, no distress, appears stated age, morbidly obese, EYE: negative, LYMPH NODES: No cervical or supraclavicular adenopathy. THROAT & NECK: normal, LUNG: clear to auscultation bilaterally, HEART: regular rate and rhythm, S1, S2 normal, no murmur. ABDOMEN: Distended, soft. Upper abdominal pain with palpation. No mass, guarding, or hernia. EXTREMITIES:  extremities normal, atraumatic, no cyanosis or edema, SKIN: Normal., NEUROLOGIC: negative     Imaging:  reviewed  CT and upper endoscopy findings ; reviewed HIDA scan results. Assessment:      Abdominal pain, suspect biliary dyskinesia. Gallbladder ejection fraction= 9%. Plan:      1. I recommend proceeding with laparoscopic cholecystectomy. 2. Discussed aspects of surgical intervention, methods, risks (including by not limited to infection, bleeding, hematoma, bile duct injury, conversion open procedure, recurrent/persistent symptoms, and perforation of the intestines or solid organs), and the risks of general anesthetic. The patient understands the risks; all questions were answered to the patient's satisfaction. 3. Patient wishes to proceed with surgery.

## 2022-08-04 NOTE — ANESTHESIA POSTPROCEDURE EVALUATION
Procedure(s):  LAPAROSCOPIC CHOLECYSTECTOMY. general    Anesthesia Post Evaluation      Multimodal analgesia: multimodal analgesia used between 6 hours prior to anesthesia start to PACU discharge  Patient location during evaluation: PACU  Level of consciousness: awake  Pain management: satisfactory to patient  Airway patency: patent  Anesthetic complications: no  Cardiovascular status: acceptable and hemodynamically stable  Respiratory status: acceptable  Hydration status: acceptable  Post anesthesia nausea and vomiting:  none  Final Post Anesthesia Temperature Assessment:  Normothermia (36.0-37.5 degrees C)      INITIAL Post-op Vital signs:   Vitals Value Taken Time   /76 08/04/22 1400   Temp 36.4 °C (97.6 °F) 08/04/22 1400   Pulse 65 08/04/22 1410   Resp 16 08/04/22 1410   SpO2 94 % 08/04/22 1410   Vitals shown include unvalidated device data.

## 2022-08-04 NOTE — ANESTHESIA PREPROCEDURE EVALUATION
Relevant Problems   RESPIRATORY SYSTEM   (+) Asthma      NEUROLOGY   (+) Depression   (+) Schizophrenia (HCC)      CARDIOVASCULAR   (+) Hypertension      GASTROINTESTINAL   (+) Esophageal reflux   (+) MOSS (nonalcoholic steatohepatitis)      RENAL FAILURE   (+) Chronic renal disease, stage III      ENDOCRINE   (+) Arthritis   (+) Obesity, unspecified      PERSONAL HX & FAMILY HX OF CANCER   (+) Skin cancer       Anesthetic History   No history of anesthetic complications            Review of Systems / Medical History  Patient summary reviewed, nursing notes reviewed and pertinent labs reviewed    Pulmonary            Asthma : well controlled       Neuro/Psych       CVA  TIA and psychiatric history     Cardiovascular    Hypertension              Exercise tolerance: >4 METS     GI/Hepatic/Renal     GERD      Liver disease (fatty liver, cirhosis)     Endo/Other        Morbid obesity and arthritis     Other Findings   Comments: Brain AVM-MRI 2013; stable ischemic changes           Physical Exam    Airway  Mallampati: II  TM Distance: 4 - 6 cm  Neck ROM: normal range of motion   Mouth opening: Normal     Cardiovascular  Regular rate and rhythm,  S1 and S2 normal,  no murmur, click, rub, or gallop  Rhythm: regular  Rate: normal         Dental  No notable dental hx       Pulmonary  Breath sounds clear to auscultation               Abdominal  GI exam deferred       Other Findings            Anesthetic Plan    ASA: 3  Anesthesia type: general          Induction: Intravenous  Anesthetic plan and risks discussed with: Patient

## 2022-08-04 NOTE — BRIEF OP NOTE
Brief Postoperative Note    Patient: Dk Vega  YOB: 1950  MRN: [id number]    Date of Procedure: 8/4/2022     Pre-Op Diagnosis: BILIARY DYSKINESIA    Post-Op Diagnosis: Same as preoperative diagnosis.       Procedure(s):  LAPAROSCOPIC CHOLECYSTECTOMY    Surgeon(s):  Chato Wayne MD    Surgical Assistant: Physician Assistant: SEBLE Reeder    Anesthesia: General     Estimated Blood Loss (mL): Minimal    Complications: None    Specimens:   ID Type Source Tests Collected by Time Destination   1 : gallbladder Fresh Gallbladder  Chato Wayne MD 8/4/2022 1236 Pathology        Implants: * No implants in log *    Drains: * No LDAs found *    Findings: Chronic cholecystitis    Electronically Signed by Miesha Akbar MD on 8/4/2022 at 12:44 PM

## 2022-08-05 NOTE — OP NOTES
[address]   OPERATIVE REPORT    Name:  Onelia Lester  MR#:  [id number]  :  1950  ACCOUNT #:  [de-identified]  DATE OF SERVICE:  2022      PREOPERATIVE DIAGNOSIS:  Biliary dyskinesia. POSTOPERATIVE DIAGNOSIS:  Biliary dyskinesia. PROCEDURE PERFORMED:  Laparoscopic cholecystectomy (CPT [id number]). SURGEON:  James Mays MD    ASSISTANT:  SEBLE Mcdermott    ANESTHESIA:  General endotracheal.    COMPLICATIONS:  None. SPECIMENS REMOVED:  Gallbladder with contents. IMPLANTS:  None. ESTIMATED BLOOD LOSS:  30 mL. DRAINS:  None. COUNTS:  Sponge count correct. Needle count correct. INDICATION:  The patient is a 60-year-old morbidly obese white female with longstanding right upper quadrant pain, with symptom exacerbation with intake of fatty foods. She has undergone extensive workup to include Gastroenterology and Hepatology. She was recently diagnosed with nonalcoholic steatohepatitis. Recent HIDA scan reveals a gallbladder ejection fraction of 9%, consistent with biliary dyskinesia. She was taken to the operating room today for laparoscopic cholecystectomy. I have asked SEBLE Mcdermott, to assist with the procedure given the patient's morbid obesity and multiple prior abdominal surgeries. She will run the laparoscope and assist in exposure of the portal structures. FINDINGS:  Distended gallbladder with signs of chronic cholecystitis. PROCEDURE:  The patient was identified as the correct patient in the preoperative holding area, and informed consent was confirmed. After answering the patient's remaining questions, she was taken to the operating room and placed on the operating room table in supine position. Sequential compression devices were placed on both lower extremities. Following the uneventful initiation of general anesthesia, she was carefully secured to the operating room table with footboard and safety strap in place.   All potential pressure points were padded with eggcrate. Her abdomen was prepped and draped in the usual sterile fashion. Final time-out was performed, and it was confirmed she had received intravenous antibiotics. A 5-mm trocar was inserted through a small right-sided skin incision using an Optiview technique. After confirming intraperitoneal location of the trocar tip, insufflation with carbon dioxide gas was initiated. Once adequate working sites had been developed, a 5-mm, 30-degree laparoscope was inserted. No signs of trocar injury were present. The liver was noted to be enlarged. The gallbladder was noted to be distended, consistent with biliary dyskinesia. A 12-mm trocar was inserted through a small epigastric incision using visual guidance with the laparoscope. The patient was placed in a steep reverse Trendelenburg position. Two additional 5-mm trocars were inserted through small upper abdominal incisions using visual guidance with the laparoscope. The gallbladder was grasped at the fundus and infundibulum with retraction oriented cephalad and laterally, thus opening Calot's triangle. Thickened peritoneal tissue was stripped away from the infundibulum in the direction of the portal structures. This allowed identification of the cystic artery and cystic duct. Each structure was circumferentially dissected free from surrounding tissue. Once the critical view of safety had been achieved, the cystic duct was triply ligated between titanium hemoclips and then divided. The cystic artery was doubly ligated between titanium clips and then divided. The gallbladder was dissected free from the gallbladder fossa and liver using electrocautery and blunt dissection. Once freed, it was placed within a retrieval bag and removed from the patient's body. Once pneumoperitoneum had been re-established, the right upper quadrant was irrigated with sterile saline.   After confirming satisfactory hemostasis, the 12-mm fascial defect was closed with a 0 Vicryl suture using a laparoscopic suture passer. Pneumoperitoneum was released, all trocars were removed. All wounds were infiltrated with 0.5% Marcaine without epinephrine. All skin edges were re-approximated with a combination of subcuticular 4-0 Monocryl suture and Dermabond. The patient tolerated the procedure well. She was extubated in the operating room and transported to the recovery area in stable condition. The attending surgeon, Dr. Vidal Tiwari, was scrubbed and present for the entire procedure.         Brenda Saucedo MD      [id number]  D:  08/04/2022 20:39  T:  08/05/2022 4:06  JOB #:  [id number]

## 2022-08-15 ENCOUNTER — VIRTUAL VISIT (OUTPATIENT)
Dept: SURGERY | Age: 72
End: 2022-08-15
Payer: MEDICARE

## 2022-08-15 DIAGNOSIS — Z09 POSTOPERATIVE EXAMINATION: Primary | ICD-10-CM

## 2022-08-15 DIAGNOSIS — Z90.49 S/P LAPAROSCOPIC CHOLECYSTECTOMY: ICD-10-CM

## 2022-08-15 PROCEDURE — 99024 POSTOP FOLLOW-UP VISIT: CPT

## 2022-08-15 NOTE — PROGRESS NOTES
I was in the office while conducting this encounter. Consent:  She and/or her healthcare decision maker is aware that this patient-initiated Telehealth encounter is a billable service, with coverage as determined by her insurance carrier. She is aware that she may receive a bill and has provided verbal consent to proceed: Yes    This virtual visit was conducted via [employer]. me. Pursuant to the emergency declaration under the [address] [place] [address], [address]5 waiver authority and the [employer] Act, this Virtual  Visit was conducted to reduce the patient's risk of exposure to COVID-19 and provide continuity of care for an established patient. Services were provided through a video synchronous discussion virtually to substitute for in-person clinic visit. Due to this being a TeleHealth evaluation, many elements of the physical examination are unable to be assessed. Total Time: minutes: 5-10 minutes. Parul Diaz, was evaluated through a synchronous (real-time) audio-video encounter. The patient (or guardian if applicable) is aware that this is a billable service, which includes applicable co-pays. This Virtual Visit was conducted with patient's (and/or legal guardian's) consent. The visit was conducted pursuant to the emergency declaration under the [address] [place] [address], [address] [place] [address] authority and the [employer] General Act. Patient identification was verified, and a caregiver was present when appropriate. The patient was located at: Home: [address] [address]  The provider was located at: Facility [facility] [address]  [facility] [address]  [address] M AGNES Pereira on 8/15/2022 at 2:00 PM    An electronic signature was used to authenticate this note.      Chief Complaint   Patient presents with    Surgical Follow-up 11 day s/p Laparoscopic cholecystectomy [phone]     Subjective:     Horace Sawyer is a 67 y.o. female presents for postop care 11 days s/p Laparoscopic cholecystectomy for Biliary dyskinesia. Patient doing well postoperatively. Appetite is good. No nausea/vomiting. Tolerating a regular diet. Bowel movements are regular. The patient is voiding without difficulty. The patient is not having any pain. .  Patient denies fever, chest pain, or shortness of breath. Review of Systems:  A comprehensive review of systems was negative except for that written above      Ms. Deejay Guerrero has a reminder for a \"due or due soon\" health maintenance. I have asked that she contact her primary care provider for follow-up on this health maintenance. Objective: There were no vitals taken for this visit. Pt appears well  Ambulating independently  Abdomen appears soft  Lap sites C/D/I without erythema or induration  Speech is clear, breathing unlabored     Assessment:       ICD-10-CM ICD-9-CM    1. Postoperative examination  Z09 V67.00       2. S/P laparoscopic cholecystectomy  Z90.49 V45.89           Plan:   11 days s/p Laparoscopic cholecystectomy for Biliary dyskinesia. Reviewed pathology with patient   Gallbladder, cholecystectomy:        Chronic cholecystitis and autolytic changes     Wound care discussed. May get incisions wet. Moisturize as needed. We discussed the importance of proper diet post op- Caution with fatty / fried foods as can cause some stomach upset and diarrhea. No lifting greater than 20 lbs x1 week then 30 lbs x1 week. Advance activity as tolerated. Recommended use of heating pad and/or supportive wear for any abdominal soreness. May take 1000 mg of Tylenol every 8 hours as needed for pain. Horace Sawyer verbalized understanding and questions were answered to the best of my knowledge and ability. Instructed patient to call with any questions or concerns.   Discharged from surgical care with prn follow up         > 10 minutes were spent with patient with greater than 50% of that time spent face to face counseling    Tee Ryan NP  [facility]   8/15/2022

## 2022-08-15 NOTE — PATIENT INSTRUCTIONS
-  No lifting greater than 20 lbs x1 week then 30 lbs x1 week then may advance activity as tolerated.     -  Caution with fatty / fried foods as can cause some stomach upset and diarrhea     -  Ok to bath as normal and you may get in a swimming pool     -  May take at 1000 mg of Tylenol every 8 hours as needed for pain    -  May use heating pad and/or supportive wear for any abdominal soreness    -  ok to moisturize the incisions as desired     -  Call with any questions or concerns    -  Follow up only as needed

## 2022-08-15 NOTE — PROGRESS NOTES
1. Have you been to the ER, urgent care clinic since your last visit? Hospitalized since your last visit? No    2. Have you seen or consulted any other health care providers outside of the [address] [place] [address] since your last visit? Include any pap smears or colon screening.  No

## 2022-10-06 RX ORDER — HYDROCHLOROTHIAZIDE 12.5 MG/1
12.5 CAPSULE ORAL DAILY
Qty: 90 CAPSULE | Refills: 1 | Status: SHIPPED | OUTPATIENT
Start: 2022-10-06

## 2022-10-06 RX ORDER — SIMVASTATIN 40 MG/1
40 TABLET, FILM COATED ORAL
Qty: 90 TABLET | Refills: 3 | Status: SHIPPED | OUTPATIENT
Start: 2022-10-06

## 2022-10-06 NOTE — TELEPHONE ENCOUNTER
Last Visit: 7/22/22 MD James Zhang  Next Appointment: None  Previous Refill Encounter(s): 5/13/22 90 + 1    Requested Prescriptions     Pending Prescriptions Disp Refills    hydroCHLOROthiazide (MICROZIDE) 12.5 mg capsule 90 Capsule 1     Sig: Take 1 Capsule by mouth daily. For [facility] in place:   Recommendation Provided To:    Intervention Detail: New Rx: 1, reason: Patient Preference  Gap Closed?:   Intervention Accepted By:   Time Spent (min): 5

## 2022-10-06 NOTE — TELEPHONE ENCOUNTER
Von sending request stating out of refills. Jose, Ch[place]    Last Visit: 7/22/22 MD Anisa Peña, lipid 7/2022  Next Appointment: None  Previous Refill Encounter(s): 12/15/21 90 + 3    Requested Prescriptions     Pending Prescriptions Disp Refills    simvastatin (ZOCOR) 40 mg tablet 90 Tablet 3     Sig: Take 1 Tablet by mouth nightly. For [facility] in place:   Recommendation Provided To:    Intervention Detail: New Rx: 1, reason: Patient Preference  Gap Closed?:   Intervention Accepted By:   Time Spent (min): 5

## 2022-12-28 ENCOUNTER — OFFICE VISIT (OUTPATIENT)
Dept: HEMATOLOGY | Age: 72
End: 2022-12-28
Payer: MEDICARE

## 2022-12-28 VITALS
WEIGHT: 178 LBS | BODY MASS INDEX: 32.76 KG/M2 | HEART RATE: 69 BPM | DIASTOLIC BLOOD PRESSURE: 55 MMHG | OXYGEN SATURATION: 98 % | HEIGHT: 62 IN | SYSTOLIC BLOOD PRESSURE: 135 MMHG | TEMPERATURE: 97.2 F

## 2022-12-28 DIAGNOSIS — K82.8 BILIARY DYSKINESIA: ICD-10-CM

## 2022-12-28 DIAGNOSIS — K75.81 NASH (NONALCOHOLIC STEATOHEPATITIS): Primary | ICD-10-CM

## 2022-12-28 PROBLEM — Z90.49 HISTORY OF CHOLECYSTECTOMY: Status: ACTIVE | Noted: 2022-12-28

## 2022-12-28 LAB
ALBUMIN SERPL-MCNC: 4.4 G/DL (ref 3.5–5)
ALBUMIN/GLOB SERPL: 1.5 {RATIO} (ref 1.1–2.2)
ALP SERPL-CCNC: 74 U/L (ref 45–117)
ALT SERPL-CCNC: 34 U/L (ref 12–78)
ANION GAP SERPL CALC-SCNC: 4 MMOL/L (ref 5–15)
AST SERPL-CCNC: 24 U/L (ref 15–37)
BASOPHILS # BLD: 0.1 K/UL (ref 0–0.1)
BASOPHILS NFR BLD: 1 % (ref 0–1)
BILIRUB DIRECT SERPL-MCNC: 0.2 MG/DL (ref 0–0.2)
BILIRUB SERPL-MCNC: 0.8 MG/DL (ref 0.2–1)
BUN SERPL-MCNC: 19 MG/DL (ref 6–20)
BUN/CREAT SERPL: 17 (ref 12–20)
CALCIUM SERPL-MCNC: 10.2 MG/DL (ref 8.5–10.1)
CHLORIDE SERPL-SCNC: 102 MMOL/L (ref 97–108)
CO2 SERPL-SCNC: 32 MMOL/L (ref 21–32)
CREAT SERPL-MCNC: 1.14 MG/DL (ref 0.55–1.02)
DIFFERENTIAL METHOD BLD: NORMAL
EOSINOPHIL # BLD: 0.1 K/UL (ref 0–0.4)
EOSINOPHIL NFR BLD: 1 % (ref 0–7)
ERYTHROCYTE [DISTWIDTH] IN BLOOD BY AUTOMATED COUNT: 12.2 % (ref 11.5–14.5)
GLOBULIN SER CALC-MCNC: 3 G/DL (ref 2–4)
GLUCOSE SERPL-MCNC: 103 MG/DL (ref 65–100)
HCT VFR BLD AUTO: 44.6 % (ref 35–47)
HGB BLD-MCNC: 15 G/DL (ref 11.5–16)
IMM GRANULOCYTES # BLD AUTO: 0 K/UL (ref 0–0.04)
IMM GRANULOCYTES NFR BLD AUTO: 0 % (ref 0–0.5)
LYMPHOCYTES # BLD: 1.8 K/UL (ref 0.8–3.5)
LYMPHOCYTES NFR BLD: 19 % (ref 12–49)
MCH RBC QN AUTO: 29.4 PG (ref 26–34)
MCHC RBC AUTO-ENTMCNC: 33.6 G/DL (ref 30–36.5)
MCV RBC AUTO: 87.3 FL (ref 80–99)
MONOCYTES # BLD: 0.7 K/UL (ref 0–1)
MONOCYTES NFR BLD: 8 % (ref 5–13)
NEUTS SEG # BLD: 6.6 K/UL (ref 1.8–8)
NEUTS SEG NFR BLD: 71 % (ref 32–75)
NRBC # BLD: 0 K/UL (ref 0–0.01)
NRBC BLD-RTO: 0 PER 100 WBC
PLATELET # BLD AUTO: 251 K/UL (ref 150–400)
PMV BLD AUTO: 10.5 FL (ref 8.9–12.9)
POTASSIUM SERPL-SCNC: 4.4 MMOL/L (ref 3.5–5.1)
PROT SERPL-MCNC: 7.4 G/DL (ref 6.4–8.2)
RBC # BLD AUTO: 5.11 M/UL (ref 3.8–5.2)
SODIUM SERPL-SCNC: 138 MMOL/L (ref 136–145)
WBC # BLD AUTO: 9.3 K/UL (ref 3.6–11)

## 2022-12-28 PROCEDURE — 3078F DIAST BP <80 MM HG: CPT | Performed by: NURSE PRACTITIONER

## 2022-12-28 PROCEDURE — 1090F PRES/ABSN URINE INCON ASSESS: CPT | Performed by: NURSE PRACTITIONER

## 2022-12-28 PROCEDURE — G9899 SCRN MAM PERF RSLTS DOC: HCPCS | Performed by: NURSE PRACTITIONER

## 2022-12-28 PROCEDURE — G8399 PT W/DXA RESULTS DOCUMENT: HCPCS | Performed by: NURSE PRACTITIONER

## 2022-12-28 PROCEDURE — G9717 DOC PT DX DEP/BP F/U NT REQ: HCPCS | Performed by: NURSE PRACTITIONER

## 2022-12-28 PROCEDURE — G8427 DOCREV CUR MEDS BY ELIG CLIN: HCPCS | Performed by: NURSE PRACTITIONER

## 2022-12-28 PROCEDURE — G8536 NO DOC ELDER MAL SCRN: HCPCS | Performed by: NURSE PRACTITIONER

## 2022-12-28 PROCEDURE — 3017F COLORECTAL CA SCREEN DOC REV: CPT | Performed by: NURSE PRACTITIONER

## 2022-12-28 PROCEDURE — 1101F PT FALLS ASSESS-DOCD LE1/YR: CPT | Performed by: NURSE PRACTITIONER

## 2022-12-28 PROCEDURE — 1123F ACP DISCUSS/DSCN MKR DOCD: CPT | Performed by: NURSE PRACTITIONER

## 2022-12-28 PROCEDURE — 91200 LIVER ELASTOGRAPHY: CPT | Performed by: NURSE PRACTITIONER

## 2022-12-28 PROCEDURE — 3074F SYST BP LT 130 MM HG: CPT | Performed by: NURSE PRACTITIONER

## 2022-12-28 PROCEDURE — 99214 OFFICE O/P EST MOD 30 MIN: CPT | Performed by: NURSE PRACTITIONER

## 2022-12-28 PROCEDURE — G8417 CALC BMI ABV UP PARAM F/U: HCPCS | Performed by: NURSE PRACTITIONER

## 2022-12-28 NOTE — PROGRESS NOTES
Identified pt with two pt identifiers(name and ). Reviewed record in preparation for visit and have obtained necessary documentation. No chief complaint on file. Vitals:    22 1031 22 1041   BP: (!) 136/48 (!) 135/55   Pulse: 69 69   Temp: 97.2 °F (36.2 °C)    TempSrc: Temporal    SpO2: 98%    Weight: 178 lb (80.7 kg)    Height: 5' 2\" (1.575 m)    PainSc:   0 - No pain        Health Maintenance Review: Patient reminded of \"due or due soon\" health maintenance. I have asked the patient to contact his/her primary care provider (PCP) for follow-up on his/her health maintenance. Coordination of Care Questionnaire:  :   1) Have you been to an emergency room, urgent care, or hospitalized since your last visit? If yes, where when, and reason for visit? no       2. Have seen or consulted any other health care provider since your last visit? If yes, where when, and reason for visit? YES      Patient is accompanied by spouse I have received verbal consent from Glenroy Parson to discuss any/all medical information while they are present in the room.

## 2022-12-28 NOTE — PROGRESS NOTES
[facility], MD, [address], [place], [place]      Claudean Rouleau, PA-C Harold Lang, [facility]     Jacqui Camejo, [facility]   YOSSI Cardona-ARAMIS Jackson, [facility]       Ilir Roberts [address]    at [address], [address] [place] [address], [facility] 22.    [phone]    FAX: [address] [place] [address] Avenue    [facility]    [address], [address]    [place], [address] [place] [address] Street - [address]    [phone]    FAX: [phone]       Patient Care Team:  Iban Lopez MD as PCP - General (Family Medicine)  Iban Lopez MD as PCP - [facility]  Sylwia Page MD (Psychiatry)  Vivi Carter MD (Cardiovascular Disease Physician)  Jenna Valdez MD as Physician (Gastroenterology)  Ghislaine Edwards DO (Dermatology Physician)  Ana Bowman MD as Physician (Ophthalmology)      Problem List  Date Reviewed: 8/15/2022            Codes Class Noted    History of cholecystectomy ICD-10-CM: Z90.49  ICD-9-CM: V45.79  12/28/2022        Biliary dyskinesia ICD-10-CM: K82.8  ICD-9-CM: 575.8  7/27/2022        Chronic renal disease, stage III ICD-10-CM: N18.30  ICD-9-CM: 585.3  7/27/2022        MOSS (nonalcoholic steatohepatitis) ICD-10-CM: K75.81  ICD-9-CM: 571.8  Unknown        Anxiety ICD-10-CM: F41.9  ICD-9-CM: 300.00  Unknown        Arthritis ICD-10-CM: M19.90  ICD-9-CM: 716.90  Unknown        Asthma ICD-10-CM: J45.909  ICD-9-CM: 493.90  Unknown    Overview Signed 12/5/2016  5:08 PM by Vic Jones MD     in younger years             Hypertension ICD-10-CM: I10  ICD-9-CM: 401.9  Unknown        IBS (irritable bowel syndrome) ICD-10-CM: K58.9  ICD-9-CM: 564.1  Unknown        Osteopenia ICD-10-CM: M85.80  ICD-9-CM: 733.90  Unknown    Overview Signed 12/5/2016  5:08 PM by Vic Jones, MD     started Fosamax therapy 4/2015             Prediabetes ICD-10-CM: R73.03  ICD-9-CM: 790.29  Unknown        Skin cancer ICD-10-CM: C44.90  ICD-9-CM: 173.90  Unknown        History of CVA (cerebrovascular accident) ICD-10-CM: Z86.73  ICD-9-CM: V12.54  Unknown    Overview Signed 12/5/2016  5:08 PM by Guicho Miguel MD     no deficits             PFO (patent foramen ovale) ICD-10-CM: Q21.12  ICD-9-CM: 745.5  Unknown    Overview Signed 12/5/2016  5:13 PM by Guicho Miguel MD     reportedly dx'ed with CVA in 2012             Schizophrenia [facility]) ICD-10-CM: F20.9  ICD-9-CM: 295.90  8/8/2012        AVM (arteriovenous malformation) brain ICD-10-CM: Q28.2  ICD-9-CM: 747.81  8/8/2012    Overview Signed 12/5/2016  5:15 PM by Guicho Miguel MD     MRI 2013; stable ischemic changes; hx of CVA             Insomnia ICD-10-CM: G47.00  ICD-9-CM: 780.52  Unknown        Depression ICD-10-CM: F32. A  ICD-9-CM: 077  9/23/2009        Esophageal reflux ICD-10-CM: K21.9  ICD-9-CM: 530.81  Unknown        Obesity, unspecified ICD-10-CM: E66.9  ICD-9-CM: 278.00  Unknown        Allergic rhinitis due to other allergen ICD-10-CM: J30.89  ICD-9-CM: 477.8  Unknown        Hyperlipidemia ICD-10-CM: E78.5  ICD-9-CM: 272.4  9/23/2009           Jae Pet[facility] for management of non-alcoholic steatohepatitis (MOSS). The active problem list, all pertinent past medical history, medications, liver histology, radiologic findings and laboratory findings related to the liver disorder were reviewed and discussed with the patient. The patient is a 67 y.o. female who is suspected to have fatty liver disease based upon ultrasound and CT    The patient underwent a liver biopsy in 5/2022 by Dr. Nelia Salazar. The results demonstrate mild MOSS with stage 2 fibrosis. She returns for Fibroscan today.     Since the last office visit the patient has continued to make dietary changes and has had  a 33 lb weight loss since 4/2022. She continued to have abdominal pain and HIDA demonstrated gallbladder dysfunction. A cholecystectomy was performed by Dr. Aurora Skiff 8/2022. The pain has improved. The patient has the following symptoms which could be attributed to the liver disorder:    Fatigue and pain in the right side over the liver. Both have improved. The patient is not experiencing the following symptoms which are commonly seen in this liver disorder:   swelling of the lower extremities, or itching. The patient has Moderate limitations in functional activities which can be attributed to other medical problems that are not related to the liver disease. ASSESSMENT AND PLAN:  MOSS  The diagnosis is based upon liver biopsy, features of metabolic syndrome, serologic studies that are negative for other causes of chronic liver disease,     A liver biopsy performed in 5/2022 demonstrates mioderate steatosis, mild inflammation, mild ballooning, stage 2 fibrosis. Fibroscan in 4/2022 demonstrated  15.6 kPa and  suggesting fatty liver and cirrhosis. Assessment of liver fibrosis was performed with Fibroscan in the office today. The result was 5.3 kPa which correlates with no fibrosis. The CAP score of 276 suggests hepatic steatosis. Have performed laboratory testing to monitor liver function and degree of liver injury. This included BMP, hepatic panel, and CBC with platelet count. Laboratory testing from 7/22/2022 reviewed in detail. Follow-up testing ordered today. The liver transaminases, ALP, liver function and platelet count are normal.     If the patient looses 20% of current body weight, which is 40 pounds, down to a weight of of 160 pounds, steatosis should resolve. Once all steatosis has resolved inflammation will resolve. Fibrosis will gradually resolve and the liver could eventually be normal.     There is currently no FDA approved medical treatment for fatty liver, NALFD or MOSS.   The only medical treatments for MOSS are though clinical trials. She does not qualify due to a low JUAN score and successful weight loss. Counseling for diet and weight loss in patients with confirmed or suspected NAFLD  The patient was counseled regarding diet and exercise to achieve weight loss. The best diet for patients with fatty liver is one very low in carbohydrates and enriched with protein. The patient was told not to consume any food products and drinks containing fructose as this enhances hepatic fat synthesis. Congratulated her on successful weight loss. Encouraged her to continue with changes. The BMI has improved to 32 with a goal of 24-25. Screening for Hepatocellular Carcinoma  HCC screening is not necessary if the patient does not have cirrhosis. Treatment of other medical problems in patients with chronic liver disease  There are no contraindications for the patient to take most medications that are necessary for treatment of other medical issues. Counseling for alcohol in patients with chronic liver disease  The patient does not consume any significant amount of alcohol. Vaccinations   accination for viral hepatitis A is not needed. The patient has serologic evidence of prior exposure or vaccination with immunity. Routine vaccinations against other bacterial and viral agents can be performed as indicated. Annual flu vaccination should be administered if indicated. ALLERGIES  Allergies   Allergen Reactions    Bee Venom Protein (Honey Bee) Hives    Voltaren [Diclofenac Sodium] Diarrhea       MEDICATIONS  Current Outpatient Medications   Medication Sig    famotidine (PEPCID) 20 mg tablet TAKE 1 TABLET TWICE DAILY (REFLUX) AS NEEDED    simvastatin (ZOCOR) 40 mg tablet Take 1 Tablet by mouth nightly. hydroCHLOROthiazide (MICROZIDE) 12.5 mg capsule Take 1 Capsule by mouth daily. buPROPion SR (WELLBUTRIN SR) 150 mg SR tablet Take 150 mg by mouth daily.     omeprazole (PRILOSEC) 20 mg capsule Daily (before breakfast).    haloperidol (HALDOL) 1 mg tablet Take 1 mg by mouth every other day. EVERY OTHER DAY AT NIGHT    CALCIUM 600 WITH VITAMIN D3 600 mg(1,500mg) -400 unit chew     DOCOSAHEXANOIC ACID/EPA (FISH OIL PO) Take 1 Cap by mouth two (2) times a day. aspirin (ASPIRIN) 325 mg tablet Take 1 Tab by mouth daily. FLUoxetine (PROzac) 40 mg capsule Take 40 mg by mouth nightly. loratadine 10 mg Cap Take 10 mg by mouth daily. No current facility-administered medications for this visit. SYSTEM REVIEW NOT RELATED TO LIVER DISEASE OR REVIEWED ABOVE:  Constitution systems: Negative for fever, chills, weight gain, weight loss. Eyes: Negative for visual changes. ENT: Negative for sore throat, painful swallowing. Respiratory: Negative for cough, hemoptysis, SOB. Cardiology: Negative for chest pain, palpitations. GI:  Negative for constipation or diarrhea. : Negative for urinary frequency, dysuria, hematuria, nocturia. Skin: Negative for rash. Hematology: Negative for easy bruising, blood clots. Musculo-skeletal: Negative for back pain, muscle pain, weakness. Neurologic: Negative for headaches, dizziness, vertigo, memory problems not related to HE. Psychology: Negative for anxiety, depression. FAMILY HISTORY:  The patient is adopted and does not know any of his family history. SOCIAL HISTORY:  The patient is . The patient has 2 children, 5 grandchildren, 3 GGC  The patient has never used tobacco products. The patient has never consumed significant amounts of alcohol. The patient does not work outside the home. PHYSICAL EXAMINATION:  Visit Vitals  BP (!) 135/55 (BP 1 Location: Right upper arm, BP Patient Position: Sitting, BP Cuff Size: Adult)   Pulse 69   Temp 97.2 °F (36.2 °C) (Temporal)   Ht 5' 2\" (1.575 m)   Wt 178 lb (80.7 kg)   SpO2 98%   BMI 32.56 kg/m²       General: No acute distress. Eyes: Sclera anicteric. ENT: No oral lesions. Thyroid normal.  Nodes: No adenopathy. Skin: No spider angiomata. No jaundice. No palmar erythema. Respiratory: Lungs clear to auscultation. Cardiovascular: Regular heart rate. No murmurs. No JVD. Abdomen: Soft non-tender, liver size normal to percussion/palpation. Spleen not palpable. No obvious ascites. Extremities: No edema. No muscle wasting. No gross arthritic changes. Neurologic: Alert and oriented. Cranial nerves grossly intact. No asterixis. LABORATORY STUDIES:  [facility] [address] & Units 7/22/2022 4/8/2022   WBC 3.6 - 11.0 K/uL 7.8 12.1 (H)   ANC 1.8 - 8.0 K/UL 5.0 9.0 (H)   HGB 11.5 - 16.0 g/dL 14.4 14.6    - 400 K/uL 233 252   INR 0.9 - 1.1    1.0   AST 15 - 37 U/L 22 23   ALT 12 - 78 U/L 33 37   Alk Phos 45 - 117 U/L 69 86   Bili, Total 0.2 - 1.0 MG/DL 0.8 1.1 (H)   Bili, Direct 0.0 - 0.2 MG/DL  0.2   Albumin 3.5 - 5.0 g/dL 4.1 4.1   BUN 6 - 20 MG/DL 20 15   Creat 0.55 - 1.02 MG/DL 1.12 (H) 1.07 (H)   Na 136 - 145 mmol/L 138 136   K 3.5 - 5.1 mmol/L 3.9 4.1   Cl 97 - 108 mmol/L 103 100   CO2 21 - 32 mmol/L 31 28   Glucose 65 - 100 mg/dL 97 91     Laboratory testing from 7/22/2022 reviewed in detail. Additional testing included to evaluate progression or regression of disease. Laboratory testing results from today will be communicated by My Chart. SEROLOGIES:  Serologies Latest Ref Rng & Units 4/8/2022   Hep A Ab, Total Negative   Positive (A)   Hep B Surface Ag Index <0.10   Hep B Surface Ag Interp Negative   Negative   Hep B Core Ab, Total Negative   Negative   Hep B Surface Ab mIU/mL <3.10   Hep B Surface Ab Interp NONREACTIVE   NONREACTIVE   Hep C Ab NONREACTIVE   NONREACTIVE   Ferritin 26 - 388 NG/   Iron % Saturation 20 - 50 % 18 (L)   Ceruloplasmin 19.0 - 39.0 mg/dL 27.6   Alpha-1 antitrypsin level 101 - 187 mg/dL 137     LIVER HISTOLOGY:  4/2022. FibroScan performed at [address]. EkPa was 15.6. IQR/med 96%. . The results suggested a fibrosis level of F4. The high IQR% suggests that the measurement may not be reliable. The CAP score suggests there is hepatic steatosis. 5/2022. Slides reviewed by KM. MOSS. 50-66% macrovesicualr and micovesicular steatosis, mild inflammation, mild ballooning, Stage 2 fibrosis. JUAN (211). 12/2022. [facility]. EkPa was 5.3. IQR/med 13%. . The results suggested a fibrosis level of F0. The CAP score suggests there is hepatic steatosis. ENDOSCOPIC PROCEDURES:  Not available or performed    RADIOLOGY:  1/2022. Ultrasound of liver. Echogenic consistent with fatty liver. No liver mass lesions. No dilated bile ducts. No ascites. 2/2022. CT scan abdomen with IV contrast.  Changes consistent with fatty liver. No liver mass lesions. Normal spleen. No ascites. OTHER TESTING:  Not available or performed    FOLLOW-UP:  All of the issues listed above in the Assessment and Plan were discussed with the patient. All questions were answered. The patient expressed a clear understanding of the above. [address] [address] in 6 months for ongoing monitoring and treatment. April S.  Bashir, [facility] [address] [facility] [address] [address] Gigi Hurd, [address] [facility] 22.  [address] [facility]

## 2023-01-03 NOTE — PROGRESS NOTES
Letter sent regarding the blood work results. The liver enzymes and function are normal. The sr. Creatinine was increased slightly. Advised she stay hydrated.

## 2023-01-06 ENCOUNTER — OFFICE VISIT (OUTPATIENT)
Dept: FAMILY MEDICINE CLINIC | Age: 73
End: 2023-01-06
Payer: MEDICARE

## 2023-01-06 VITALS
OXYGEN SATURATION: 97 % | HEART RATE: 61 BPM | BODY MASS INDEX: 32.61 KG/M2 | TEMPERATURE: 97.7 F | RESPIRATION RATE: 16 BRPM | SYSTOLIC BLOOD PRESSURE: 127 MMHG | WEIGHT: 177.2 LBS | HEIGHT: 62 IN | DIASTOLIC BLOOD PRESSURE: 74 MMHG

## 2023-01-06 DIAGNOSIS — I10 HTN, GOAL BELOW 140/90: Primary | ICD-10-CM

## 2023-01-06 DIAGNOSIS — F20.9 SCHIZOPHRENIA, UNSPECIFIED TYPE (HCC): ICD-10-CM

## 2023-01-06 DIAGNOSIS — R20.0 NUMBNESS AND TINGLING IN LEFT HAND: ICD-10-CM

## 2023-01-06 DIAGNOSIS — N18.30 STAGE 3 CHRONIC KIDNEY DISEASE, UNSPECIFIED WHETHER STAGE 3A OR 3B CKD (HCC): ICD-10-CM

## 2023-01-06 DIAGNOSIS — R20.2 NUMBNESS AND TINGLING IN LEFT HAND: ICD-10-CM

## 2023-01-06 NOTE — PROGRESS NOTES
Chief Complaint   Patient presents with    Hypertension     Follow up    Numbness     On left hand especially when pt is holding something         1. \"Have you been to the ER, urgent care clinic since your last visit? Hospitalized since your last visit? \" No    2. \"Have you seen or consulted any other health care providers outside of the [address] [place] [address] since your last visit? \" No     3. For patients aged 39-70: Has the patient had a colonoscopy / FIT/ Cologuard? NA - based on age      If the patient is female:    4. For patients aged 41-77: Has the patient had a mammogram within the past 2 years? NA - based on age or sex      11. For patients aged 21-65: Has the patient had a pap smear?  NA - based on age or sex         3 most recent PHQ Screens 1/6/2023   Little interest or pleasure in doing things Not at all   Feeling down, depressed, irritable, or hopeless Not at all   Total Score PHQ 2 0   Trouble falling or staying asleep, or sleeping too much Not at all   Feeling tired or having little energy Not at all   Poor appetite, weight loss, or overeating Not at all   Feeling bad about yourself - or that you are a failure or have let yourself or your family down Not at all   Trouble concentrating on things such as school, work, reading, or watching TV Not at all   Moving or speaking so slowly that other people could have noticed; or the opposite being so fidgety that others notice Not at all   Thoughts of being better off dead, or hurting yourself in some way Not at all   PHQ 9 Score 0   How difficult have these problems made it for you to do your work, take care of your home and get along with others Not difficult at all       Health Maintenance Due   Topic Date Due    Shingles Vaccine (1 of 2) Never done    Pneumococcal 65+ years (2 - PCV) 07/22/2017    Flu Vaccine (1) 08/01/2022    COVID-19 Vaccine (5 - Booster for Recio Peter series) 12/23/2022

## 2023-01-06 NOTE — PROGRESS NOTES
Patient Name: Rosalinda Villanueva   MRN: [id number]    Laureen Tiwari is a 67 y.o. female who presents with the following:     Here with . Patient states over the past 3 weeks, she has had blurry vision which has affected her ability to see. Has been seen by her eye doctor who is treating this. She has been holding her smart phone in her left hand differently than usual.  Now reports numbness and tingling in the last 3 fingers of her left hand. Will wake up occasionally at night needing to shake her hand for relief. Is right-handed. Blood pressure readings have been elevated at home. She admits she has been stressed due to her vision issues which are slowly improving. BP is normal today. BP Readings from Last 3 Encounters:   01/06/23 127/74   12/28/22 (!) 135/55   08/04/22 134/71     Review of Systems   Constitutional:  Negative for fever, malaise/fatigue and weight loss. Respiratory:  Negative for cough, hemoptysis, shortness of breath and wheezing. Cardiovascular:  Negative for chest pain, palpitations, leg swelling and PND. Gastrointestinal:  Negative for abdominal pain, constipation, diarrhea, nausea and vomiting. Neurological:  Positive for tingling. The patient's medications, allergies, past medical history, surgical history, family history and social history were reviewed and updated where appropriate. Current Outpatient Medications:     famotidine (PEPCID) 20 mg tablet, TAKE 1 TABLET TWICE DAILY (REFLUX) AS NEEDED, Disp: 180 Tablet, Rfl: 2    simvastatin (ZOCOR) 40 mg tablet, Take 1 Tablet by mouth nightly., Disp: 90 Tablet, Rfl: 3    hydroCHLOROthiazide (MICROZIDE) 12.5 mg capsule, Take 1 Capsule by mouth daily. , Disp: 90 Capsule, Rfl: 1    buPROPion SR (WELLBUTRIN SR) 150 mg SR tablet, Take 150 mg by mouth daily. , Disp: , Rfl:     omeprazole (PRILOSEC) 20 mg capsule, Daily (before breakfast). , Disp: , Rfl:     haloperidol (HALDOL) 1 mg tablet, Take 1 mg by mouth every other day. EVERY OTHER DAY AT NIGHT, Disp: , Rfl:     CALCIUM 600 WITH VITAMIN D3 600 mg(1,500mg) -400 unit chew, , Disp: , Rfl:     DOCOSAHEXANOIC ACID/EPA (FISH OIL PO), Take 1 Cap by mouth two (2) times a day., Disp: , Rfl:     aspirin (ASPIRIN) 325 mg tablet, Take 1 Tab by mouth daily. , Disp: 365 Tab, Rfl: 0    FLUoxetine (PROzac) 40 mg capsule, Take 40 mg by mouth nightly.  , Disp: , Rfl:     loratadine 10 mg Cap, Take 10 mg by mouth daily. , Disp: , Rfl:     Allergies   Allergen Reactions    Bee Venom Protein (Honey Bee) Hives    Voltaren [Diclofenac Sodium] Diarrhea       OBJECTIVE    Visit Vitals  /74 (BP Patient Position: Sitting)   Pulse 61   Temp 97.7 °F (36.5 °C) (Temporal)   Resp 16   Ht 5' 2\" (1.575 m)   Wt 177 lb 3.2 oz (80.4 kg)   SpO2 97%   BMI 32.41 kg/m²       Physical Exam  Vitals and nursing note reviewed. Constitutional:       General: She is not in acute distress. Appearance: She is not diaphoretic. Eyes:      Conjunctiva/sclera: Conjunctivae normal.      Pupils: Pupils are equal, round, and reactive to light. Cardiovascular:      Rate and Rhythm: Normal rate and regular rhythm. Heart sounds: Normal heart sounds. No murmur heard. No friction rub. No gallop. Pulmonary:      Effort: Pulmonary effort is normal. No respiratory distress. Breath sounds: Normal breath sounds. No wheezing. Musculoskeletal:      Left wrist: No swelling, deformity or tenderness. Normal range of motion. Comments: Positive Tinel's sign at left wrist; negative Phalen's. Skin:     General: Skin is warm and dry. Neurological:      Mental Status: She is alert. ASSESSMENT AND PLAN  Zeeshan Green is a 67 y.o. female who presents today for:    1. HTN, goal below 140/90  Pt to monitor BP at home; possible due to stress. Normal in office today. 2. Schizophrenia, unspecified type ([facility] [address]  Stable, continue current treatment.     3. Numbness and tingling in left hand  Likely carpal tunnel syndrome due to holding her smartphone. Recommend adjusting her position, exercises, and wrist brace at night. 4. Stage 3 chronic kidney disease, unspecified whether stage 3a or 3b CKD (HCC)  Stable, continue current treatment. There are no discontinued medications. Follow-up and Dispositions    Return for Medicare Wellness Visit (30 min). Treatment risks/benefits/costs/interactions/alternatives discussed with patient. Advised patient to call back or return to office if symptoms worsen/change/persist. If patient cannot reach us or should anything more severe/urgent arise he/she should proceed directly to the nearest emergency department. Discussed expected course/resolution/complications of diagnosis in detail with patient. Patient expressed understanding with the diagnosis and plan. This dictation may have been completed with Dragon, the computer voice recognition software. Unanticipated grammatical, syntax, homophones, and other interpretive errors are sometimes inadvertently transcribed by the computer software. Please disregard any errors that have escaped final proofreading. Yareli Piper M.D.

## 2023-03-02 RX ORDER — HYDROCHLOROTHIAZIDE 12.5 MG/1
CAPSULE ORAL
Qty: 90 CAPSULE | Refills: 1 | Status: SHIPPED | OUTPATIENT
Start: 2023-03-02

## 2023-03-16 ENCOUNTER — TELEPHONE (OUTPATIENT)
Dept: FAMILY MEDICINE CLINIC | Age: 73
End: 2023-03-16

## 2023-03-16 NOTE — TELEPHONE ENCOUNTER
Nurses/MA's, could you please call the pt directly and see if she would be willing to share what questions she has with you? [place] is the best way to communicate with me if she wants to send me a direct message.

## 2023-03-16 NOTE — TELEPHONE ENCOUNTER
Patient called in regards to wanting to speak to Dr. Nikolai Willett (did not want to explain why). She is requesting a call back from Dr. Nikolai Willett.     Best call back number: [phone]

## 2023-03-20 ENCOUNTER — OFFICE VISIT (OUTPATIENT)
Dept: FAMILY MEDICINE CLINIC | Age: 73
End: 2023-03-20
Payer: MEDICARE

## 2023-03-20 VITALS
OXYGEN SATURATION: 99 % | RESPIRATION RATE: 16 BRPM | DIASTOLIC BLOOD PRESSURE: 62 MMHG | WEIGHT: 176.2 LBS | HEIGHT: 62 IN | TEMPERATURE: 97.8 F | HEART RATE: 67 BPM | SYSTOLIC BLOOD PRESSURE: 148 MMHG | BODY MASS INDEX: 32.42 KG/M2

## 2023-03-20 DIAGNOSIS — R41.3 MEMORY LOSS: ICD-10-CM

## 2023-03-20 DIAGNOSIS — N63.20 PAINFUL LUMPY LEFT BREAST: ICD-10-CM

## 2023-03-20 DIAGNOSIS — J01.90 ACUTE SINUSITIS, RECURRENCE NOT SPECIFIED, UNSPECIFIED LOCATION: Primary | ICD-10-CM

## 2023-03-20 DIAGNOSIS — N64.4 PAINFUL LUMPY LEFT BREAST: ICD-10-CM

## 2023-03-20 DIAGNOSIS — I10 HTN, GOAL BELOW 140/90: ICD-10-CM

## 2023-03-20 DIAGNOSIS — N64.2: ICD-10-CM

## 2023-03-20 PROCEDURE — G8427 DOCREV CUR MEDS BY ELIG CLIN: HCPCS | Performed by: FAMILY MEDICINE

## 2023-03-20 PROCEDURE — G9899 SCRN MAM PERF RSLTS DOC: HCPCS | Performed by: FAMILY MEDICINE

## 2023-03-20 PROCEDURE — 1101F PT FALLS ASSESS-DOCD LE1/YR: CPT | Performed by: FAMILY MEDICINE

## 2023-03-20 PROCEDURE — 3017F COLORECTAL CA SCREEN DOC REV: CPT | Performed by: FAMILY MEDICINE

## 2023-03-20 PROCEDURE — 1090F PRES/ABSN URINE INCON ASSESS: CPT | Performed by: FAMILY MEDICINE

## 2023-03-20 PROCEDURE — G8536 NO DOC ELDER MAL SCRN: HCPCS | Performed by: FAMILY MEDICINE

## 2023-03-20 PROCEDURE — G8399 PT W/DXA RESULTS DOCUMENT: HCPCS | Performed by: FAMILY MEDICINE

## 2023-03-20 PROCEDURE — 1123F ACP DISCUSS/DSCN MKR DOCD: CPT | Performed by: FAMILY MEDICINE

## 2023-03-20 PROCEDURE — G9717 DOC PT DX DEP/BP F/U NT REQ: HCPCS | Performed by: FAMILY MEDICINE

## 2023-03-20 PROCEDURE — G8417 CALC BMI ABV UP PARAM F/U: HCPCS | Performed by: FAMILY MEDICINE

## 2023-03-20 PROCEDURE — 99214 OFFICE O/P EST MOD 30 MIN: CPT | Performed by: FAMILY MEDICINE

## 2023-03-20 PROCEDURE — 3077F SYST BP >= 140 MM HG: CPT | Performed by: FAMILY MEDICINE

## 2023-03-20 PROCEDURE — 3078F DIAST BP <80 MM HG: CPT | Performed by: FAMILY MEDICINE

## 2023-03-20 RX ORDER — ACETAMINOPHEN 500 MG
TABLET ORAL
Qty: 1 KIT | Refills: 0 | Status: SHIPPED | OUTPATIENT
Start: 2023-03-20

## 2023-03-20 RX ORDER — FLUTICASONE PROPIONATE 50 MCG
2 SPRAY, SUSPENSION (ML) NASAL DAILY
Qty: 1 EACH | Refills: 0 | Status: SHIPPED | OUTPATIENT
Start: 2023-03-20

## 2023-03-20 RX ORDER — DOXYCYCLINE 100 MG/1
100 TABLET ORAL 2 TIMES DAILY
Qty: 14 TABLET | Refills: 0 | Status: SHIPPED | OUTPATIENT
Start: 2023-03-20 | End: 2023-03-27

## 2023-03-20 NOTE — PROGRESS NOTES
Erick Romero is a 68 y.o. female    Chief Complaint   Patient presents with    Follow-up       Visit Vitals  BP (!) 148/62 (BP 1 Location: Left upper arm, BP Patient Position: Sitting, BP Cuff Size: Adult) Comment: Manual BP   Pulse 67   Temp 97.8 °F (36.6 °C) (Oral)   Resp 16   Ht 5' 2\" (1.575 m)   Wt 176 lb 3.2 oz (79.9 kg)   SpO2 99%   BMI 32.23 kg/m²           1. Have you been to the ER, urgent care clinic since your last visit? Hospitalized since your last visit? NO    2. Have you seen or consulted any other health care providers outside of the [address] [place] [address] since your last visit? Include any pap smears or colon screening.  NO

## 2023-03-20 NOTE — PROGRESS NOTES
Patient Name: Rene Eid   MRN: [id number]    Christi Paula is a 68 y.o. female who presents with the following:     Reports one week hx of sinus pressure, right sided headache, and pain along neck when bending forward. Taking OTC products plus decongestants. BPs have been high at home. Reports increased lumps and pain in left breast. Thinks her right breast shrunk. Had a mammogram done in 8/22. Reports worsening memory over the past year. Difficulty with word recall. Does see psychiatrist. Normal CT head done in 7/22. Wt Readings from Last 3 Encounters:   03/20/23 176 lb 3.2 oz (79.9 kg)   01/06/23 177 lb 3.2 oz (80.4 kg)   12/28/22 178 lb (80.7 kg)     BP Readings from Last 3 Encounters:   03/20/23 (!) 153/72   01/06/23 127/74   12/28/22 (!) 135/55     Review of Systems   Constitutional:  Negative for fever, malaise/fatigue and weight loss. HENT:  Positive for congestion and sinus pain. Respiratory:  Negative for cough, hemoptysis, shortness of breath and wheezing. Cardiovascular:  Negative for chest pain, palpitations, leg swelling and PND. Gastrointestinal:  Negative for abdominal pain, constipation, diarrhea, nausea and vomiting. Musculoskeletal:  Positive for neck pain. Neurological:  Positive for headaches. The patient's medications, allergies, past medical history, surgical history, family history and social history were reviewed and updated where appropriate.       Current Outpatient Medications:     multivit,iron,minerals/lutein (CENTRUM SILVER ULTRA WOMEN'S PO), Take  by mouth., Disp: , Rfl:     hydroCHLOROthiazide (MICROZIDE) 12.5 mg capsule, TAKE 1 CAPSULE EVERY DAY, Disp: 90 Capsule, Rfl: 1    famotidine (PEPCID) 20 mg tablet, TAKE 1 TABLET TWICE DAILY (REFLUX) AS NEEDED, Disp: 180 Tablet, Rfl: 2    simvastatin (ZOCOR) 40 mg tablet, Take 1 Tablet by mouth nightly., Disp: 90 Tablet, Rfl: 3    buPROPion SR (WELLBUTRIN SR) 150 mg SR tablet, Take 150 mg by mouth daily., Disp: , Rfl:     haloperidol (HALDOL) 1 mg tablet, Take 1 mg by mouth every other day. EVERY OTHER DAY AT NIGHT, Disp: , Rfl:     CALCIUM 600 WITH VITAMIN D3 600 mg(1,500mg) -400 unit chew, , Disp: , Rfl:     DOCOSAHEXANOIC ACID/EPA (FISH OIL PO), Take 1 Cap by mouth two (2) times a day., Disp: , Rfl:     aspirin (ASPIRIN) 325 mg tablet, Take 1 Tab by mouth daily. , Disp: 365 Tab, Rfl: 0    FLUoxetine (PROzac) 40 mg capsule, Take 40 mg by mouth nightly.  , Disp: , Rfl:     loratadine 10 mg Cap, Take 10 mg by mouth daily. , Disp: , Rfl:     Allergies   Allergen Reactions    Bee Venom Protein (Honey Bee) Hives    Voltaren [Diclofenac Sodium] Diarrhea     OBJECTIVE    Visit Vitals  BP (!) 153/72 (BP 1 Location: Left upper arm, BP Patient Position: Sitting, BP Cuff Size: Adult)   Pulse 67   Temp 97.8 °F (36.6 °C) (Oral)   Resp 16   Ht 5' 2\" (1.575 m)   Wt 176 lb 3.2 oz (79.9 kg)   SpO2 99%   BMI 32.23 kg/m²       Physical Exam  Vitals and nursing note reviewed. Constitutional:       General: She is not in acute distress. Appearance: She is not diaphoretic. HENT:      Head: Normocephalic and atraumatic. Right Ear: No decreased hearing noted. No middle ear effusion. Tympanic membrane is not perforated or erythematous. Left Ear: No decreased hearing noted. No middle ear effusion. Tympanic membrane is not perforated or erythematous. Nose:      Right Sinus: Maxillary sinus tenderness and frontal sinus tenderness present. Left Sinus: No maxillary sinus tenderness or frontal sinus tenderness. Mouth/Throat:      Mouth: Mucous membranes are moist.      Pharynx: Oropharynx is clear. Uvula midline. No pharyngeal swelling, oropharyngeal exudate, posterior oropharyngeal erythema or uvula swelling. Eyes:      Conjunctiva/sclera: Conjunctivae normal.      Pupils: Pupils are equal, round, and reactive to light. Cardiovascular:      Rate and Rhythm: Normal rate and regular rhythm.       Heart sounds: Normal heart sounds. No murmur heard. No friction rub. No gallop. Pulmonary:      Effort: Pulmonary effort is normal. No respiratory distress. Breath sounds: Normal breath sounds. No wheezing. Musculoskeletal:      Cervical back: Normal range of motion and neck supple. Lymphadenopathy:      Cervical: No cervical adenopathy. Skin:     General: Skin is warm and dry. Neurological:      Mental Status: She is alert. ASSESSMENT AND PLAN  Dinesh Delacruz is a 68 y.o. female who presents today for:    1. Acute sinusitis, recurrence not specified, unspecified location  discussed diagnosis & treatment options, likely bacterial at this time. Will start meds below. Expected time course for resolution reviewed with patient. Reviewed signs and symptoms that would indicate a worsening medical condition which would require immediate evaluation and treatment; patient expressed understanding of plan. - doxycycline (ADOXA) 100 mg tablet; Take 1 Tablet by mouth two (2) times a day for 7 days. Dispense: 14 Tablet; Refill: 0  - fluticasone propionate (FLONASE) 50 mcg/actuation nasal spray; 2 Sprays by Both Nostrils route daily. Dispense: 1 Each; Refill: 0    2. Painful lumpy left breast  - PANCHO 3D VICTOR MANUEL W MAMMO BI DX INCL CAD; Future    3. Decrease in breast size  - PANCHO 3D VICTOR MANUEL W MAMMO BI DX INCL CAD; Future    4. HTN, goal below 140/90  Possibly due to decongestant; would stop this and monitor BP at home. - Blood Pressure Monitor kit; Blood pressure kit and adult normal cuff  Dispense: 1 Kit; Refill: 0    5. Memory loss  Recommend pt to see psychiatrist to see if they do neuropsychological assessments. Will check labs as below.  - TSH 3RD GENERATION; Future  - T4 (THYROXINE); Future  - VITAMIN B12 & FOLATE;  Future  - VITAMIN B12 & FOLATE  - T4 (THYROXINE)  - TSH 3RD GENERATION       Medications Discontinued During This Encounter   Medication Reason    omeprazole (PRILOSEC) 20 mg capsule LIST CLEANUP Treatment risks/benefits/costs/interactions/alternatives discussed with patient. Advised patient to call back or return to office if symptoms worsen/change/persist. If patient cannot reach us or should anything more severe/urgent arise he/she should proceed directly to the nearest emergency department. Discussed expected course/resolution/complications of diagnosis in detail with patient. Patient expressed understanding with the diagnosis and plan. This dictation may have been completed with Dragon, the computer voice recognition software. Unanticipated grammatical, syntax, homophones, and other interpretive errors are sometimes inadvertently transcribed by the computer software. Please disregard any errors that have escaped final proofreading. Yareli Reyes M.D.

## 2023-03-21 LAB
FOLATE SERPL-MCNC: 12.9 NG/ML (ref 5–21)
T4 SERPL-MCNC: 9.6 UG/DL (ref 4.8–13.9)
TSH SERPL DL<=0.05 MIU/L-ACNC: 1.91 UIU/ML (ref 0.36–3.74)
VIT B12 SERPL-MCNC: 426 PG/ML (ref 193–986)

## 2023-04-04 ENCOUNTER — HOSPITAL ENCOUNTER (OUTPATIENT)
Dept: MAMMOGRAPHY | Age: 73
End: 2023-04-04
Attending: FAMILY MEDICINE
Payer: MEDICARE

## 2023-04-04 PROCEDURE — 76642 ULTRASOUND BREAST LIMITED: CPT

## 2023-04-04 PROCEDURE — 77061 BREAST TOMOSYNTHESIS UNI: CPT

## 2023-05-01 ENCOUNTER — TELEPHONE (OUTPATIENT)
Dept: NEUROLOGY | Age: 73
End: 2023-05-01

## 2023-05-15 ENCOUNTER — TELEPHONE (OUTPATIENT)
Age: 73
End: 2023-05-15

## 2023-05-24 ENCOUNTER — TELEPHONE (OUTPATIENT)
Age: 73
End: 2023-05-24

## 2023-05-24 NOTE — TELEPHONE ENCOUNTER
Outbound call to patient  Zeb Reynaga who is on [employer]. Name and  verified. Address concerns and let patient know that they would need a OV for dementia referral. Zeb Reynaga stated wife has a OV in July and will address referral at next OV.

## 2023-05-24 NOTE — TELEPHONE ENCOUNTER
Patient's  called to get a referral for wife to be evaluated for dementia.     Requesting a call back    Best call back #[phone]

## 2023-06-01 ENCOUNTER — TELEPHONE (OUTPATIENT)
Age: 73
End: 2023-06-01

## 2023-06-02 ENCOUNTER — TELEPHONE (OUTPATIENT)
Age: 73
End: 2023-06-02

## 2023-06-02 DIAGNOSIS — R41.3 MEMORY LOSS: Primary | ICD-10-CM

## 2023-06-02 NOTE — TELEPHONE ENCOUNTER
Outbound call to patient  Jeevan Weston who is on [facility]. Name and  verified. Madelon Night referral information. Jeevan Weston understood.

## 2023-06-02 NOTE — TELEPHONE ENCOUNTER
Patient's  called state they had talk to provider about psychatrist  need referral to se Dr. Roxana Watts at [facility]  appointment 06/26/23.     Requesting a call back    Best call back #[phone]

## 2023-06-26 ENCOUNTER — OFFICE VISIT (OUTPATIENT)
Age: 73
End: 2023-06-26
Payer: MEDICARE

## 2023-06-26 ENCOUNTER — TELEPHONE (OUTPATIENT)
Age: 73
End: 2023-06-26

## 2023-06-26 DIAGNOSIS — R47.89 WORD FINDING PROBLEM: ICD-10-CM

## 2023-06-26 DIAGNOSIS — F33.41 RECURRENT MAJOR DEPRESSIVE DISORDER, IN PARTIAL REMISSION (HCC): Primary | ICD-10-CM

## 2023-06-26 DIAGNOSIS — R41.840 ATTENTION DEFICIT: ICD-10-CM

## 2023-06-26 PROCEDURE — 4004F PT TOBACCO SCREEN RCVD TLK: CPT | Performed by: CLINICAL NEUROPSYCHOLOGIST

## 2023-06-26 PROCEDURE — 90791 PSYCH DIAGNOSTIC EVALUATION: CPT | Performed by: CLINICAL NEUROPSYCHOLOGIST

## 2023-06-26 PROCEDURE — 1123F ACP DISCUSS/DSCN MKR DOCD: CPT | Performed by: CLINICAL NEUROPSYCHOLOGIST

## 2023-06-26 PROCEDURE — 90785 PSYTX COMPLEX INTERACTIVE: CPT | Performed by: CLINICAL NEUROPSYCHOLOGIST

## 2023-06-27 ENCOUNTER — PROCEDURE VISIT (OUTPATIENT)
Age: 73
End: 2023-06-27
Payer: MEDICARE

## 2023-06-27 DIAGNOSIS — F33.1 MAJOR DEPRESSIVE DISORDER, RECURRENT EPISODE, MODERATE WITH ANXIOUS DISTRESS (HCC): ICD-10-CM

## 2023-06-27 DIAGNOSIS — G31.84 MILD COGNITIVE IMPAIRMENT: Primary | ICD-10-CM

## 2023-06-27 PROCEDURE — 96138 PSYCL/NRPSYC TECH 1ST: CPT | Performed by: CLINICAL NEUROPSYCHOLOGIST

## 2023-06-27 PROCEDURE — 96136 PSYCL/NRPSYC TST PHY/QHP 1ST: CPT | Performed by: CLINICAL NEUROPSYCHOLOGIST

## 2023-06-27 PROCEDURE — 96139 PSYCL/NRPSYC TST TECH EA: CPT | Performed by: CLINICAL NEUROPSYCHOLOGIST

## 2023-06-29 ENCOUNTER — OFFICE VISIT (OUTPATIENT)
Age: 73
End: 2023-06-29
Payer: MEDICARE

## 2023-06-29 VITALS
BODY MASS INDEX: 32.02 KG/M2 | WEIGHT: 174 LBS | HEART RATE: 54 BPM | DIASTOLIC BLOOD PRESSURE: 53 MMHG | SYSTOLIC BLOOD PRESSURE: 133 MMHG | TEMPERATURE: 97.3 F | HEIGHT: 62 IN | OXYGEN SATURATION: 98 %

## 2023-06-29 DIAGNOSIS — K75.81 NASH (NONALCOHOLIC STEATOHEPATITIS): Primary | ICD-10-CM

## 2023-06-29 PROCEDURE — G8417 CALC BMI ABV UP PARAM F/U: HCPCS | Performed by: NURSE PRACTITIONER

## 2023-06-29 PROCEDURE — 99213 OFFICE O/P EST LOW 20 MIN: CPT | Performed by: NURSE PRACTITIONER

## 2023-06-29 PROCEDURE — 3017F COLORECTAL CA SCREEN DOC REV: CPT | Performed by: NURSE PRACTITIONER

## 2023-06-29 PROCEDURE — 1123F ACP DISCUSS/DSCN MKR DOCD: CPT | Performed by: NURSE PRACTITIONER

## 2023-06-29 PROCEDURE — G8427 DOCREV CUR MEDS BY ELIG CLIN: HCPCS | Performed by: NURSE PRACTITIONER

## 2023-06-29 PROCEDURE — 3074F SYST BP LT 130 MM HG: CPT | Performed by: NURSE PRACTITIONER

## 2023-06-29 PROCEDURE — 3078F DIAST BP <80 MM HG: CPT | Performed by: NURSE PRACTITIONER

## 2023-06-29 PROCEDURE — 1090F PRES/ABSN URINE INCON ASSESS: CPT | Performed by: NURSE PRACTITIONER

## 2023-06-29 PROCEDURE — 1036F TOBACCO NON-USER: CPT | Performed by: NURSE PRACTITIONER

## 2023-06-29 PROCEDURE — G8399 PT W/DXA RESULTS DOCUMENT: HCPCS | Performed by: NURSE PRACTITIONER

## 2023-06-29 RX ORDER — HALOPERIDOL 0.5 MG/1
TABLET ORAL
COMMUNITY
Start: 2023-04-20

## 2023-06-30 LAB
ALBUMIN SERPL-MCNC: 3.9 G/DL (ref 3.5–5)
ALBUMIN/GLOB SERPL: 1.2 (ref 1.1–2.2)
ALP SERPL-CCNC: 60 U/L (ref 45–117)
ALT SERPL-CCNC: 41 U/L (ref 12–78)
ANION GAP SERPL CALC-SCNC: 5 MMOL/L (ref 5–15)
AST SERPL-CCNC: 27 U/L (ref 15–37)
BASOPHILS # BLD: 0.1 K/UL (ref 0–0.1)
BASOPHILS NFR BLD: 1 % (ref 0–1)
BILIRUB DIRECT SERPL-MCNC: 0.2 MG/DL (ref 0–0.2)
BILIRUB SERPL-MCNC: 0.7 MG/DL (ref 0.2–1)
BUN SERPL-MCNC: 19 MG/DL (ref 6–20)
BUN/CREAT SERPL: 21 (ref 12–20)
CALCIUM SERPL-MCNC: 9.8 MG/DL (ref 8.5–10.1)
CHLORIDE SERPL-SCNC: 104 MMOL/L (ref 97–108)
CO2 SERPL-SCNC: 31 MMOL/L (ref 21–32)
CREAT SERPL-MCNC: 0.91 MG/DL (ref 0.55–1.02)
DIFFERENTIAL METHOD BLD: NORMAL
EOSINOPHIL # BLD: 0.2 K/UL (ref 0–0.4)
EOSINOPHIL NFR BLD: 2 % (ref 0–7)
ERYTHROCYTE [DISTWIDTH] IN BLOOD BY AUTOMATED COUNT: 12 % (ref 11.5–14.5)
GLOBULIN SER CALC-MCNC: 3.3 G/DL (ref 2–4)
GLUCOSE SERPL-MCNC: 89 MG/DL (ref 65–100)
HCT VFR BLD AUTO: 45.5 % (ref 35–47)
HGB BLD-MCNC: 14.8 G/DL (ref 11.5–16)
IMM GRANULOCYTES # BLD AUTO: 0 K/UL (ref 0–0.04)
IMM GRANULOCYTES NFR BLD AUTO: 0 % (ref 0–0.5)
LYMPHOCYTES # BLD: 2.2 K/UL (ref 0.8–3.5)
LYMPHOCYTES NFR BLD: 25 % (ref 12–49)
MCH RBC QN AUTO: 30 PG (ref 26–34)
MCHC RBC AUTO-ENTMCNC: 32.5 G/DL (ref 30–36.5)
MCV RBC AUTO: 92.3 FL (ref 80–99)
MONOCYTES # BLD: 0.7 K/UL (ref 0–1)
MONOCYTES NFR BLD: 8 % (ref 5–13)
NEUTS SEG # BLD: 5.8 K/UL (ref 1.8–8)
NEUTS SEG NFR BLD: 64 % (ref 32–75)
NRBC # BLD: 0 K/UL (ref 0–0.01)
NRBC BLD-RTO: 0 PER 100 WBC
PLATELET # BLD AUTO: 233 K/UL (ref 150–400)
PMV BLD AUTO: 10.4 FL (ref 8.9–12.9)
POTASSIUM SERPL-SCNC: 4.3 MMOL/L (ref 3.5–5.1)
PROT SERPL-MCNC: 7.2 G/DL (ref 6.4–8.2)
RBC # BLD AUTO: 4.93 M/UL (ref 3.8–5.2)
SODIUM SERPL-SCNC: 140 MMOL/L (ref 136–145)
WBC # BLD AUTO: 9.1 K/UL (ref 3.6–11)

## 2023-07-03 SDOH — ECONOMIC STABILITY: FOOD INSECURITY: WITHIN THE PAST 12 MONTHS, YOU WORRIED THAT YOUR FOOD WOULD RUN OUT BEFORE YOU GOT MONEY TO BUY MORE.: NEVER TRUE

## 2023-07-03 SDOH — ECONOMIC STABILITY: HOUSING INSECURITY
IN THE LAST 12 MONTHS, WAS THERE A TIME WHEN YOU DID NOT HAVE A STEADY PLACE TO SLEEP OR SLEPT IN A SHELTER (INCLUDING NOW)?: NO

## 2023-07-03 SDOH — HEALTH STABILITY: PHYSICAL HEALTH: ON AVERAGE, HOW MANY MINUTES DO YOU ENGAGE IN EXERCISE AT THIS LEVEL?: 0 MIN

## 2023-07-03 SDOH — ECONOMIC STABILITY: TRANSPORTATION INSECURITY
IN THE PAST 12 MONTHS, HAS LACK OF TRANSPORTATION KEPT YOU FROM MEETINGS, WORK, OR FROM GETTING THINGS NEEDED FOR DAILY LIVING?: NO

## 2023-07-03 SDOH — ECONOMIC STABILITY: FOOD INSECURITY: WITHIN THE PAST 12 MONTHS, THE FOOD YOU BOUGHT JUST DIDN'T LAST AND YOU DIDN'T HAVE MONEY TO GET MORE.: NEVER TRUE

## 2023-07-03 SDOH — ECONOMIC STABILITY: INCOME INSECURITY: HOW HARD IS IT FOR YOU TO PAY FOR THE VERY BASICS LIKE FOOD, HOUSING, MEDICAL CARE, AND HEATING?: NOT HARD AT ALL

## 2023-07-03 SDOH — HEALTH STABILITY: PHYSICAL HEALTH: ON AVERAGE, HOW MANY DAYS PER WEEK DO YOU ENGAGE IN MODERATE TO STRENUOUS EXERCISE (LIKE A BRISK WALK)?: 0 DAYS

## 2023-07-03 ASSESSMENT — PATIENT HEALTH QUESTIONNAIRE - PHQ9
8. MOVING OR SPEAKING SO SLOWLY THAT OTHER PEOPLE COULD HAVE NOTICED. OR THE OPPOSITE, BEING SO FIGETY OR RESTLESS THAT YOU HAVE BEEN MOVING AROUND A LOT MORE THAN USUAL: 0
SUM OF ALL RESPONSES TO PHQ QUESTIONS 1-9: 1
7. TROUBLE CONCENTRATING ON THINGS, SUCH AS READING THE NEWSPAPER OR WATCHING TELEVISION: 0
6. FEELING BAD ABOUT YOURSELF - OR THAT YOU ARE A FAILURE OR HAVE LET YOURSELF OR YOUR FAMILY DOWN: 0
SUM OF ALL RESPONSES TO PHQ QUESTIONS 1-9: 1
SUM OF ALL RESPONSES TO PHQ QUESTIONS 1-9: 1
9. THOUGHTS THAT YOU WOULD BE BETTER OFF DEAD, OR OF HURTING YOURSELF: 0
SUM OF ALL RESPONSES TO PHQ QUESTIONS 1-9: 1
4. FEELING TIRED OR HAVING LITTLE ENERGY: 0
5. POOR APPETITE OR OVEREATING: 0
1. LITTLE INTEREST OR PLEASURE IN DOING THINGS: 1
10. IF YOU CHECKED OFF ANY PROBLEMS, HOW DIFFICULT HAVE THESE PROBLEMS MADE IT FOR YOU TO DO YOUR WORK, TAKE CARE OF THINGS AT HOME, OR GET ALONG WITH OTHER PEOPLE: 0
3. TROUBLE FALLING OR STAYING ASLEEP: 0
2. FEELING DOWN, DEPRESSED OR HOPELESS: 0
SUM OF ALL RESPONSES TO PHQ9 QUESTIONS 1 & 2: 1

## 2023-07-03 ASSESSMENT — LIFESTYLE VARIABLES
HOW MANY STANDARD DRINKS CONTAINING ALCOHOL DO YOU HAVE ON A TYPICAL DAY: PATIENT DOES NOT DRINK
HOW MANY STANDARD DRINKS CONTAINING ALCOHOL DO YOU HAVE ON A TYPICAL DAY: 0
HOW OFTEN DO YOU HAVE A DRINK CONTAINING ALCOHOL: 1
HOW OFTEN DO YOU HAVE SIX OR MORE DRINKS ON ONE OCCASION: 1
HOW OFTEN DO YOU HAVE A DRINK CONTAINING ALCOHOL: NEVER

## 2023-07-06 ENCOUNTER — OFFICE VISIT (OUTPATIENT)
Age: 73
End: 2023-07-06
Payer: MEDICARE

## 2023-07-06 VITALS
HEIGHT: 62 IN | OXYGEN SATURATION: 98 % | TEMPERATURE: 96.9 F | HEART RATE: 58 BPM | BODY MASS INDEX: 32.24 KG/M2 | WEIGHT: 175.2 LBS | SYSTOLIC BLOOD PRESSURE: 126 MMHG | RESPIRATION RATE: 14 BRPM | DIASTOLIC BLOOD PRESSURE: 62 MMHG

## 2023-07-06 DIAGNOSIS — R73.03 PREDIABETES: ICD-10-CM

## 2023-07-06 DIAGNOSIS — Z00.00 ENCOUNTER FOR MEDICARE ANNUAL WELLNESS EXAM: Primary | ICD-10-CM

## 2023-07-06 DIAGNOSIS — E78.5 HYPERLIPIDEMIA, UNSPECIFIED HYPERLIPIDEMIA TYPE: ICD-10-CM

## 2023-07-06 DIAGNOSIS — Z71.89 ADVANCED CARE PLANNING/COUNSELING DISCUSSION: ICD-10-CM

## 2023-07-06 DIAGNOSIS — J30.2 SEASONAL ALLERGIES: ICD-10-CM

## 2023-07-06 DIAGNOSIS — I10 ESSENTIAL (PRIMARY) HYPERTENSION: ICD-10-CM

## 2023-07-06 LAB
ALBUMIN SERPL-MCNC: 3.9 G/DL (ref 3.5–5)
ALBUMIN/GLOB SERPL: 1.2 (ref 1.1–2.2)
ALP SERPL-CCNC: 64 U/L (ref 45–117)
ALT SERPL-CCNC: 41 U/L (ref 12–78)
ANION GAP SERPL CALC-SCNC: 5 MMOL/L (ref 5–15)
AST SERPL-CCNC: 30 U/L (ref 15–37)
BILIRUB SERPL-MCNC: 0.6 MG/DL (ref 0.2–1)
BUN SERPL-MCNC: 18 MG/DL (ref 6–20)
BUN/CREAT SERPL: 18 (ref 12–20)
CALCIUM SERPL-MCNC: 9.7 MG/DL (ref 8.5–10.1)
CHLORIDE SERPL-SCNC: 104 MMOL/L (ref 97–108)
CHOLEST SERPL-MCNC: 112 MG/DL
CO2 SERPL-SCNC: 30 MMOL/L (ref 21–32)
CREAT SERPL-MCNC: 1.02 MG/DL (ref 0.55–1.02)
EST. AVERAGE GLUCOSE BLD GHB EST-MCNC: 88 MG/DL
GLOBULIN SER CALC-MCNC: 3.3 G/DL (ref 2–4)
GLUCOSE SERPL-MCNC: 92 MG/DL (ref 65–100)
HBA1C MFR BLD: 4.7 % (ref 4–5.6)
HDLC SERPL-MCNC: 49 MG/DL
HDLC SERPL: 2.3 (ref 0–5)
LDLC SERPL CALC-MCNC: 49.6 MG/DL (ref 0–100)
POTASSIUM SERPL-SCNC: 4 MMOL/L (ref 3.5–5.1)
PROT SERPL-MCNC: 7.2 G/DL (ref 6.4–8.2)
SODIUM SERPL-SCNC: 139 MMOL/L (ref 136–145)
TRIGL SERPL-MCNC: 67 MG/DL
VLDLC SERPL CALC-MCNC: 13.4 MG/DL

## 2023-07-06 PROCEDURE — 3074F SYST BP LT 130 MM HG: CPT | Performed by: FAMILY MEDICINE

## 2023-07-06 PROCEDURE — G8399 PT W/DXA RESULTS DOCUMENT: HCPCS | Performed by: FAMILY MEDICINE

## 2023-07-06 PROCEDURE — 1090F PRES/ABSN URINE INCON ASSESS: CPT | Performed by: FAMILY MEDICINE

## 2023-07-06 PROCEDURE — 1036F TOBACCO NON-USER: CPT | Performed by: FAMILY MEDICINE

## 2023-07-06 PROCEDURE — G8427 DOCREV CUR MEDS BY ELIG CLIN: HCPCS | Performed by: FAMILY MEDICINE

## 2023-07-06 PROCEDURE — G0439 PPPS, SUBSEQ VISIT: HCPCS | Performed by: FAMILY MEDICINE

## 2023-07-06 PROCEDURE — 3078F DIAST BP <80 MM HG: CPT | Performed by: FAMILY MEDICINE

## 2023-07-06 PROCEDURE — 99213 OFFICE O/P EST LOW 20 MIN: CPT | Performed by: FAMILY MEDICINE

## 2023-07-06 PROCEDURE — G8417 CALC BMI ABV UP PARAM F/U: HCPCS | Performed by: FAMILY MEDICINE

## 2023-07-06 PROCEDURE — 1123F ACP DISCUSS/DSCN MKR DOCD: CPT | Performed by: FAMILY MEDICINE

## 2023-07-06 PROCEDURE — 3017F COLORECTAL CA SCREEN DOC REV: CPT | Performed by: FAMILY MEDICINE

## 2023-07-06 RX ORDER — TRIAMCINOLONE ACETONIDE 55 UG/1
2 SPRAY, METERED NASAL DAILY
Qty: 1 EACH | Refills: 0 | Status: SHIPPED | OUTPATIENT
Start: 2023-07-06

## 2023-07-06 ASSESSMENT — ENCOUNTER SYMPTOMS
DIARRHEA: 0
NAUSEA: 0
VOMITING: 0
ABDOMINAL PAIN: 0
SHORTNESS OF BREATH: 0
SINUS PRESSURE: 1
COUGH: 0
WHEEZING: 0
CONSTIPATION: 0
CHEST TIGHTNESS: 0

## 2023-07-06 ASSESSMENT — VISUAL ACUITY: OU: 1

## 2023-07-06 NOTE — PROGRESS NOTES
Patient Name: José Vargas   MRN: [id number]    [facility] Olamide is a 68 y.o. female who presents with the following:     Recently had neuropsychological testing due to memory decline; has an upcoming appointment to go over the results. She reports 3-week history of increased sinus pressure and ear pressure. Thinks it might be due to her seasonal allergies. Using Claritin and daily sinus rinses. Denies any other URI symptoms or fevers. Wt Readings from Last 3 Encounters:   07/06/23 175 lb 3.2 oz (79.5 kg)   06/29/23 174 lb (78.9 kg)   03/20/23 176 lb 3.2 oz (79.9 kg)     BP Readings from Last 3 Encounters:   07/06/23 126/62   06/29/23 (!) 133/53   03/20/23 (!) 148/62     Review of Systems   Constitutional:  Negative for activity change, appetite change, fatigue, fever and unexpected weight change. HENT:  Positive for ear pain and sinus pressure. Respiratory:  Negative for cough, chest tightness, shortness of breath and wheezing. Cardiovascular:  Negative for chest pain, palpitations and leg swelling. Gastrointestinal:  Negative for abdominal pain, constipation, diarrhea, nausea and vomiting. Genitourinary:  Negative for dysuria, frequency and urgency. Skin:  Negative for rash. Neurological:  Negative for dizziness, weakness and headaches. Psychiatric/Behavioral:  Negative for dysphoric mood and suicidal ideas. The patient is not nervous/anxious. All other systems reviewed and are negative. The patient's medications, allergies, past medical history, surgical history, family history and social history were reviewed and updated where appropriate.       Current Outpatient Medications:     haloperidol (HALDOL) 0.5 MG tablet, , Disp: , Rfl:     aspirin 325 MG tablet, Take by mouth daily, Disp: , Rfl:     buPROPion (WELLBUTRIN SR) 150 MG extended release tablet, Take by mouth daily, Disp: , Rfl:     Calcium Carb-Cholecalciferol 600-20 MG-MCG CHEW, ceived the following from [facility]

## 2023-07-06 NOTE — PROGRESS NOTES
Chief Complaint   Patient presents with    Medicare AWV         1. \"Have you been to the ER, urgent care clinic since your last visit? Hospitalized since your last visit? \" no    2. \"Have you seen or consulted any other health care providers outside of the [address] [place] [address] since your last visit? \" no    3. For patients aged 43-73: Has the patient had a colonoscopy / FIT/ Cologuard? Yes      If the patient is female:    4. For patients aged 43-66: Has the patient had a mammogram within the past 2 years? Yes.      5. For patients aged 21-65: Has the patient had a pap smear? N/A    PHQ-9  7/3/2023   Little interest or pleasure in doing things 1   Feeling down, depressed, or hopeless 0   Trouble falling or staying asleep, or sleeping too much 0   Feeling tired or having little energy 0   Poor appetite or overeating 0   Feeling bad about yourself - or that you are a failure or have let yourself or your family down 0   Trouble concentrating on things, such as reading the newspaper or watching television 0   Moving or speaking so slowly that other people could have noticed. Or the opposite - being so fidgety or restless that you have been moving around a lot more than usual 0   Thoughts that you would be better off dead, or of hurting yourself in some way 0   PHQ-2 Score 1   PHQ-9 Total Score 1   If you checked off any problems, how difficult have these problems made it for you to do your work, take care of things at home, or get along with other people?  0   How difficult have these problems made it for you to do your work, take care of your home and get along with others -           Financial Resource Strain: Low Risk     Difficulty of Paying Living Expenses: Not hard at all      Food Insecurity: No Food Insecurity    Worried About [place] in the Last Year: Never true    [address] in the Last Year: Never true          Health Maintenance Due   Topic Date Due    Shingles vaccine (2 of 3) 09/16/2016

## 2023-07-07 ENCOUNTER — CLINICAL DOCUMENTATION (OUTPATIENT)
Dept: SPIRITUAL SERVICES | Age: 73
End: 2023-07-07

## 2023-07-07 LAB
ERYTHROCYTE [DISTWIDTH] IN BLOOD BY AUTOMATED COUNT: 12.2 % (ref 11.5–14.5)
HCT VFR BLD AUTO: 44.3 % (ref 35–47)
HGB BLD-MCNC: 14.3 G/DL (ref 11.5–16)
MCH RBC QN AUTO: 29.6 PG (ref 26–34)
MCHC RBC AUTO-ENTMCNC: 32.3 G/DL (ref 30–36.5)
MCV RBC AUTO: 91.7 FL (ref 80–99)
NRBC # BLD: 0 K/UL (ref 0–0.01)
NRBC BLD-RTO: 0 PER 100 WBC
PLATELET # BLD AUTO: 232 K/UL (ref 150–400)
PMV BLD AUTO: 10.9 FL (ref 8.9–12.9)
RBC # BLD AUTO: 4.83 M/UL (ref 3.8–5.2)
WBC # BLD AUTO: 8.4 K/UL (ref 3.6–11)

## 2023-07-07 NOTE — ACP (ADVANCE CARE PLANNING)
Advance Care Planning   Ambulatory ACP Specialist Patient Outreach    Date:  7/7/2023    ACP Specialist:  Corrine Gandhi    Outreach call to patient in follow-up to ACP Specialist referral from:Tani Conrad MD    [x] PCP  [] Provider   [] Ambulatory Care Management [] Other     For:                  [x] Advance Directive Assistance              [] Complete Portable DNR order              [] Complete POST/POLST/MOST              [] Code Status Discussion             [] Discuss Goals of Care             [] Early ACP Decision-Making              [] Other (Specify)    Date Referral Received:  7/6/2023    Next Step:   [] ACP scheduled conversation  [x] Outreach again in one week               [] Email / Mail [address]  [] Email / Mail Advance Directive   [] Closing referral.  Routing closure to referring provider/staff and to ACP Specialist . [] Closure letter mailed to patient with invitation to contact ACP Specialist if / when ready. [] At this time, Healthcare Decision Maker Is:      Primary Decision Maker: Jordan Gomez - [facility] - [phone]    [] Agent named in scanned advance directive: Name:                         [x] Legal Next of Kin: Jordan Gomez    [] Unable to determine legal decision maker at this time. [] Other (Specify here): Outreaches:       [x] 1st -  Date:  7/7/2023               Intervention:  [] Spoke with Patient   [x] Left Voice mail [] Email / Mail    [] [employer]  [] Other [id number] : Outcomes: Attempted ACP outreach - no answer. Left VM requesting return call regarding referral received by ACP team from physician's office. Will attempt outreach again in one week if return call not received. [] 2nd -  Date:                 Intervention:  [] Spoke with Patient  [] Left Voice mail [] Email / Mail    [] [employer]  [] Other [id number] :               Outcomes:                [] 3rd -  Date:                Intervention:  [] Spoke with Patient   [] Left

## 2023-07-12 PROBLEM — F33.1 MAJOR DEPRESSIVE DISORDER, RECURRENT EPISODE, MODERATE WITH ANXIOUS DISTRESS (HCC): Status: ACTIVE | Noted: 2023-07-12

## 2023-07-12 PROBLEM — G31.84 MILD COGNITIVE IMPAIRMENT: Status: ACTIVE | Noted: 2023-07-12

## 2023-07-12 NOTE — PROGRESS NOTES
Neuropsychological Evaluation Report      Patient Name: Deborah Jack  YOB: 1950    Age: 68 y.o. Date of Intake: 2023   Education: 15 Date of Testin2023   Gender: Female Ethnicity: White     Referring Provider: Dr. Radha Grant:  Deborah Jack  was referred for neuropsychological evaluation by her Primary Care Physician to obtain a quantitative assessment of her current level of neurocognitive functioning, assist in differential diagnosis, and aid in individualized treatment planning. The patient understood the rationale and procedures for evaluation, as well as the limits to confidentiality, and they agreed to participate. The patient consented to have this report made available to her  treating providers through her  electronic medical records. This evaluation was completed with the patient by Dimitris Pisano PsyD with the exception of testing by technician, which was completed by SARAHY Vital under the supervision of Dr. Georgia Tai. History Sources: Patient, Spouse, Medical Record, and Test Data    SUMMARY AND IMPRESSION:  The following section is a summary of the patient's pertinent test results and impressions. A more thorough review of the patient's background and test scores can be found below. Results from the patient's neuropsychological evaluation were most notable for variable but generally moderate impairment (~2 standard deviations below the mean) on measures of executive functioning and variable but generally mild impairment (~1.5 standard deviations below the mean) on measures of mental processing speed. Aspects of expressive language (i.e., verbal fluency, sentence repetition, and spelling were also notable for moderate impairment while receptive and expressive language overall was inefficient (~1 standard deviation below the mean).   While one measure of visuospatial construction was deficient, this was primarily due

## 2023-07-17 ENCOUNTER — OFFICE VISIT (OUTPATIENT)
Age: 73
End: 2023-07-17
Payer: MEDICARE

## 2023-07-17 DIAGNOSIS — F33.1 MAJOR DEPRESSIVE DISORDER, RECURRENT EPISODE, MODERATE WITH ANXIOUS DISTRESS (HCC): ICD-10-CM

## 2023-07-17 DIAGNOSIS — G31.84 MILD COGNITIVE IMPAIRMENT: Primary | ICD-10-CM

## 2023-07-17 PROCEDURE — 96132 NRPSYC TST EVAL PHYS/QHP 1ST: CPT | Performed by: CLINICAL NEUROPSYCHOLOGIST

## 2023-07-17 PROCEDURE — 96133 NRPSYC TST EVAL PHYS/QHP EA: CPT | Performed by: CLINICAL NEUROPSYCHOLOGIST

## 2023-07-17 NOTE — PROGRESS NOTES
Prior to seeing the patient I reviewed pertinent records, including the previously completed report, the records in [address] [place] [address], and any updated visits from other providers since the patient's last visit. Today, I engaged in a psychoeducational and supportive feedback session with the patient. I provided psychotherapy in the form of psychoeducation and support with respect to the results of the recent Neuropsychological Evaluation, including discussing individual tests as well as patient's areas of neurocognitive strength versus weakness. We discussed, in detail, the following:  Cognitive testing revealed variable but generally moderate impairment on measures of executive functioning and variable but generally mild impairment on measures of mental processing speed. Aspects of expressive language were also moderately impaired while receptive language was inefficient. Attention/working memory and learning were also inefficient while recall and recognition were within normal limits. Brief assessment of psychopathology revealed moderate symptoms of depression and anxiety  At this time, patient meets criteria for the diagnosis of mild cognitive impairment  Head injury is not believed to be contributory and testing does not appear consistent with neurodegenerative syndromes. Cognitive difficulties are believed to be related to depression/anxiety and cerebrovascular disease  Reviewed recommendations outlined in report  Answered questions to the best of my ability    Education was provided regarding my diagnostic impressions, and we discussed treatment plan/options. I also answered numerous questions related to the clinical findings, including discussing various methods to improve cognition and mood. Supportive/Cognitive Behavioral/Solution Focused psychotherapy provided. The patient needs to:    Follow-up with referring provider for ongoing management  Find a therapist for psychotherapy to improve depression and

## 2023-07-18 ENCOUNTER — CLINICAL DOCUMENTATION (OUTPATIENT)
Dept: SPIRITUAL SERVICES | Age: 73
End: 2023-07-18

## 2023-07-18 NOTE — ACP (ADVANCE CARE PLANNING)
Advance Care Planning   Ambulatory ACP Specialist Patient Outreach    Date:  7/18/2023    ACP Specialist:  Beatrice Jeans, LCSW    Outreach call to patient in follow-up to ACP Specialist referral from:Tani Schaefer MD    [x] PCP  [] Provider   [] Ambulatory Care Management [] Other     For:                  [x] Advance Directive Assistance              [] Complete Portable DNR order              [] Complete POST/POLST/MOST              [] Code Status Discussion             [] Discuss Goals of Care             [] Early ACP Decision-Making              [] Other (Specify)    Date Referral Received:7/6/2023    Next Step:   [x] ACP scheduled conversation  [] Outreach again in one week               [] Email / Mail [address]  [] Email / Mail Advance Directive   [] Closing referral.  Routing closure to referring provider/staff and to ACP Specialist . [] Closure letter mailed to patient with invitation to contact ACP Specialist if / when ready. [] Other (Specify here):         [x] At this time, Healthcare Decision Maker Is:        [] Primary agent named in scanned advance directive. [x] Legal Next of Kin. [] Unable to determine legal decision maker at this time. Outcomes:           [x]  Additional Outreach -  Date:  7/18/2023   (Specify Dates & special circumstances): Outcomes: Left message on pt's voice mail reminding her of upcoming ACP appt scheduled for 7/27/2023 @ 1 pm.  Reviewed chart noting recent Neuropsy evaluation.   Beatrice Jeans, LCSW          Thank you for this referral.

## 2023-07-26 NOTE — TELEPHONE ENCOUNTER
PCP: Edis Evangelista MD    Last appt: 7/6/2023       Future Appointments   Date Time Provider [address]   1/8/2024  9:15 AM Damir Kang MD [facility] AMB   1/15/2024  8:30 AM April EILEEN Mcconnell - NP [facility] AMB   6/17/2024 10:00 AM JOSH ParsonsSPAUREA BS AMB       Requested Prescriptions     Pending Prescriptions Disp Refills    simvastatin (ZOCOR) 40 MG tablet [Pharmacy Med Name: SIMVASTATIN 40 MG Tablet] 90 tablet 1     Sig: TAKE 1 TABLET EVERY NIGHT       Prior labs and Blood pressures:  BP Readings from Last 3 Encounters:   07/06/23 126/62   06/29/23 (!) 133/53   03/20/23 (!) 148/62     Lab Results   Component Value Date/Time     07/06/2023 08:51 AM    K 4.0 07/06/2023 08:51 AM     07/06/2023 08:51 AM    CO2 30 07/06/2023 08:51 AM    BUN 18 07/06/2023 08:51 AM    GFRAA 60 07/25/2022 06:25 PM     No results found for: HBA1C, MOS6SPJK  Lab Results   Component Value Date/Time    CHOL 112 07/06/2023 08:51 AM    HDL 49 07/06/2023 08:51 AM     No results found for: VITD3, VD3RIA    Lab Results   Component Value Date/Time    TSH 1.91 03/20/2023 04:02 PM

## 2023-07-27 ENCOUNTER — CLINICAL DOCUMENTATION (OUTPATIENT)
Dept: SPIRITUAL SERVICES | Age: 73
End: 2023-07-27

## 2023-07-27 RX ORDER — SIMVASTATIN 40 MG
TABLET ORAL
Qty: 90 TABLET | Refills: 1 | Status: SHIPPED | OUTPATIENT
Start: 2023-07-27

## 2023-07-27 NOTE — ACP (ADVANCE CARE PLANNING)
Advance Care Planning     Advance Care Planning Clinical Specialist  Conversation Note      Date of ACP Conversation: 7/27/2023    Conversation Conducted with: Patient with Decision Making Capacity    ACP Clinical Specialist: Darron Solis, [facility] Decision Maker:     Current Designated Healthcare Decision Maker:     Primary Decision Maker: Arron Verduzco - Spouse - [phone]    Secondary Decision Maker: Zeeshan Ferrara - Child - [phone]    Care Preferences    Hospitalization: \"If your health worsens and it becomes clear that your chance of recovery is unlikely, what would your preference be regarding hospitalization? \"    Choice:  [] The patient wants hospitalization. [x] The patient prefers comfort-focused treatment without hospitalization. Ventilation: \"If you were in your present state of health and suddenly became very ill and were unable to breathe on your own, what would your preference be about the use of a ventilator (breathing machine) if it were available to you? \"      If the patient would desire the use of ventilator (breathing machine), answer \"yes\". If not, \"no\": no    \"If your health worsens and it becomes clear that your chance of recovery is unlikely, what would your preference be about the use of a ventilator (breathing machine) if it were available to you? \"     Would the patient desire the use of ventilator (breathing machine)?: No      Resuscitation  \"CPR works best to restart the heart when there is a sudden event, like a heart attack, in someone who is otherwise healthy. Unfortunately, CPR does not typically restart the heart for people who have serious health conditions or who are very sick. \"    \"In the event your heart stopped as a result of an underlying serious health condition, would you want attempts to be made to restart your heart (answer \"yes\" for attempt to resuscitate) or would you prefer a natural death (answer \"no\" for do not attempt to resuscitate)? \"

## 2023-09-21 ENCOUNTER — OFFICE VISIT (OUTPATIENT)
Age: 73
End: 2023-09-21
Payer: MEDICARE

## 2023-09-21 VITALS
WEIGHT: 172.8 LBS | OXYGEN SATURATION: 97 % | DIASTOLIC BLOOD PRESSURE: 68 MMHG | TEMPERATURE: 98.1 F | SYSTOLIC BLOOD PRESSURE: 122 MMHG | RESPIRATION RATE: 14 BRPM | BODY MASS INDEX: 31.8 KG/M2 | HEIGHT: 62 IN | HEART RATE: 57 BPM

## 2023-09-21 DIAGNOSIS — J02.9 SORE THROAT: Primary | ICD-10-CM

## 2023-09-21 LAB
GROUP A STREP ANTIGEN, POC: NEGATIVE
VALID INTERNAL CONTROL, POC: YES

## 2023-09-21 PROCEDURE — 1036F TOBACCO NON-USER: CPT | Performed by: FAMILY MEDICINE

## 2023-09-21 PROCEDURE — 99213 OFFICE O/P EST LOW 20 MIN: CPT | Performed by: FAMILY MEDICINE

## 2023-09-21 PROCEDURE — G8399 PT W/DXA RESULTS DOCUMENT: HCPCS | Performed by: FAMILY MEDICINE

## 2023-09-21 PROCEDURE — 1123F ACP DISCUSS/DSCN MKR DOCD: CPT | Performed by: FAMILY MEDICINE

## 2023-09-21 PROCEDURE — 3074F SYST BP LT 130 MM HG: CPT | Performed by: FAMILY MEDICINE

## 2023-09-21 PROCEDURE — 3017F COLORECTAL CA SCREEN DOC REV: CPT | Performed by: FAMILY MEDICINE

## 2023-09-21 PROCEDURE — 87880 STREP A ASSAY W/OPTIC: CPT | Performed by: FAMILY MEDICINE

## 2023-09-21 PROCEDURE — 3078F DIAST BP <80 MM HG: CPT | Performed by: FAMILY MEDICINE

## 2023-09-21 PROCEDURE — PBSHW AMB POC RAPID STREP A: Performed by: FAMILY MEDICINE

## 2023-09-21 PROCEDURE — G8427 DOCREV CUR MEDS BY ELIG CLIN: HCPCS | Performed by: FAMILY MEDICINE

## 2023-09-21 PROCEDURE — G8417 CALC BMI ABV UP PARAM F/U: HCPCS | Performed by: FAMILY MEDICINE

## 2023-09-21 PROCEDURE — 1090F PRES/ABSN URINE INCON ASSESS: CPT | Performed by: FAMILY MEDICINE

## 2023-09-21 RX ORDER — AMOXICILLIN 500 MG/1
500 CAPSULE ORAL 2 TIMES DAILY
Qty: 10 CAPSULE | Refills: 0 | Status: SHIPPED | OUTPATIENT
Start: 2023-09-21 | End: 2023-09-26

## 2023-09-21 ASSESSMENT — ENCOUNTER SYMPTOMS
NAUSEA: 0
VOMITING: 0
SHORTNESS OF BREATH: 0
WHEEZING: 0
COUGH: 0
ABDOMINAL PAIN: 0
SORE THROAT: 1
CONSTIPATION: 0
DIARRHEA: 0
CHEST TIGHTNESS: 0

## 2023-09-21 ASSESSMENT — VISUAL ACUITY: OU: 1

## 2023-09-21 NOTE — PROGRESS NOTES
Patient Name: Jad Taveras   MRN: [id number]    Jhonny Grey is a 68 y.o. female who presents with the following:     Reports 3-week history of sore throat and right ear pain. Has a mild cough and voice seems sore. Denies any fever or shortness of breath. COVID test was negative. Review of Systems   Constitutional:  Negative for activity change, appetite change, fatigue, fever and unexpected weight change. HENT:  Positive for ear pain and sore throat. Respiratory:  Negative for cough, chest tightness, shortness of breath and wheezing. Cardiovascular:  Negative for chest pain, palpitations and leg swelling. Gastrointestinal:  Negative for abdominal pain, constipation, diarrhea, nausea and vomiting. Genitourinary:  Negative for dysuria, frequency and urgency. Skin:  Negative for rash. Neurological:  Negative for dizziness, weakness and headaches. Psychiatric/Behavioral:  Negative for dysphoric mood and suicidal ideas. The patient is not nervous/anxious. All other systems reviewed and are negative. The patient's medications, allergies, past medical history, surgical history, family history and social history were reviewed and updated where appropriate.       Current Outpatient Medications:     simvastatin (ZOCOR) 40 MG tablet, TAKE 1 TABLET EVERY NIGHT, Disp: 90 tablet, Rfl: 1    triamcinolone (NASACORT) 55 MCG/ACT nasal inhaler, 2 sprays by Each Nostril route daily, Disp: 1 each, Rfl: 0    haloperidol (HALDOL) 0.5 MG tablet, , Disp: , Rfl:     aspirin 325 MG tablet, Take by mouth daily, Disp: , Rfl:     buPROPion (WELLBUTRIN SR) 150 MG extended release tablet, Take by mouth daily, Disp: , Rfl:     Calcium Carb-Cholecalciferol 600-20 MG-MCG CHEW, ceived the following from [facility]: Outside name: CALCIUM 600 WITH VITAMIN D3 600 mg(1,500mg) -400 unit chew, Disp: , Rfl:     FLUoxetine (PROZAC) 40 MG capsule, Take by mouth, Disp: , Rfl:

## 2023-09-24 LAB
BACTERIA SPEC CULT: NORMAL
SERVICE CMNT-IMP: NORMAL

## 2023-10-09 ENCOUNTER — OFFICE VISIT (OUTPATIENT)
Age: 73
End: 2023-10-09
Payer: MEDICARE

## 2023-10-09 VITALS
DIASTOLIC BLOOD PRESSURE: 74 MMHG | HEART RATE: 62 BPM | BODY MASS INDEX: 32.28 KG/M2 | SYSTOLIC BLOOD PRESSURE: 130 MMHG | RESPIRATION RATE: 14 BRPM | TEMPERATURE: 97.3 F | WEIGHT: 175.4 LBS | OXYGEN SATURATION: 98 % | HEIGHT: 62 IN

## 2023-10-09 DIAGNOSIS — J02.9 SORE THROAT: ICD-10-CM

## 2023-10-09 DIAGNOSIS — R30.0 DYSURIA: Primary | ICD-10-CM

## 2023-10-09 DIAGNOSIS — H92.03 EAR PAIN, BILATERAL: ICD-10-CM

## 2023-10-09 LAB
BILIRUBIN, URINE, POC: NEGATIVE
BLOOD URINE, POC: NORMAL
GLUCOSE URINE, POC: NEGATIVE
KETONES, URINE, POC: NEGATIVE
LEUKOCYTE ESTERASE, URINE, POC: NORMAL
NITRITE, URINE, POC: NEGATIVE
PH, URINE, POC: 6 (ref 4.6–8)
PROTEIN,URINE, POC: NEGATIVE
SPECIFIC GRAVITY, URINE, POC: 1.01 (ref 1–1.03)
URINALYSIS CLARITY, POC: NORMAL
URINALYSIS COLOR, POC: COLORLESS
UROBILINOGEN, POC: NORMAL

## 2023-10-09 PROCEDURE — G8417 CALC BMI ABV UP PARAM F/U: HCPCS | Performed by: FAMILY MEDICINE

## 2023-10-09 PROCEDURE — 81001 URINALYSIS AUTO W/SCOPE: CPT | Performed by: FAMILY MEDICINE

## 2023-10-09 PROCEDURE — G8427 DOCREV CUR MEDS BY ELIG CLIN: HCPCS | Performed by: FAMILY MEDICINE

## 2023-10-09 PROCEDURE — PBSHW AMB POC URINALYSIS DIP STICK AUTO W/ MICRO: Performed by: FAMILY MEDICINE

## 2023-10-09 PROCEDURE — 1036F TOBACCO NON-USER: CPT | Performed by: FAMILY MEDICINE

## 2023-10-09 PROCEDURE — 3017F COLORECTAL CA SCREEN DOC REV: CPT | Performed by: FAMILY MEDICINE

## 2023-10-09 PROCEDURE — G8399 PT W/DXA RESULTS DOCUMENT: HCPCS | Performed by: FAMILY MEDICINE

## 2023-10-09 PROCEDURE — 3078F DIAST BP <80 MM HG: CPT | Performed by: FAMILY MEDICINE

## 2023-10-09 PROCEDURE — 1090F PRES/ABSN URINE INCON ASSESS: CPT | Performed by: FAMILY MEDICINE

## 2023-10-09 PROCEDURE — 1123F ACP DISCUSS/DSCN MKR DOCD: CPT | Performed by: FAMILY MEDICINE

## 2023-10-09 PROCEDURE — 3075F SYST BP GE 130 - 139MM HG: CPT | Performed by: FAMILY MEDICINE

## 2023-10-09 PROCEDURE — 99214 OFFICE O/P EST MOD 30 MIN: CPT | Performed by: FAMILY MEDICINE

## 2023-10-09 PROCEDURE — G8484 FLU IMMUNIZE NO ADMIN: HCPCS | Performed by: FAMILY MEDICINE

## 2023-10-09 RX ORDER — HYDROCHLOROTHIAZIDE 12.5 MG/1
CAPSULE, GELATIN COATED ORAL
Qty: 90 CAPSULE | Refills: 1 | Status: SHIPPED | OUTPATIENT
Start: 2023-10-09

## 2023-10-09 RX ORDER — CEFPODOXIME PROXETIL 100 MG/1
100 TABLET, FILM COATED ORAL 2 TIMES DAILY
Qty: 10 TABLET | Refills: 0 | Status: SHIPPED | OUTPATIENT
Start: 2023-10-09 | End: 2023-10-14

## 2023-10-09 ASSESSMENT — ENCOUNTER SYMPTOMS
NAUSEA: 0
SORE THROAT: 1
COUGH: 0
VOMITING: 0
BLOOD IN STOOL: 0
SINUS PAIN: 0
EYE ITCHING: 0
WHEEZING: 0
EYE PAIN: 0
CONSTIPATION: 0
ABDOMINAL PAIN: 0
EYE DISCHARGE: 0
SHORTNESS OF BREATH: 0
CHEST TIGHTNESS: 0
DIARRHEA: 0

## 2023-10-09 NOTE — TELEPHONE ENCOUNTER
PCP: Tania Machuca MD    Last appt: 9/21/2023       Future Appointments   Date Time Provider [address]   1/8/2024  9:15 AM Tania Machuca MD [facility] BS AMB   1/15/2024  8:30 AM Dilia Lewis APRN - NP [facility] BS AMB   6/17/2024 10:00 AM JOSH Martinez BS AMB       Requested Prescriptions     Pending Prescriptions Disp Refills    hydroCHLOROthiazide (MICROZIDE) 12.5 MG capsule [Pharmacy Med Name: HYDROCHLOROTHIAZIDE 12.5 MG Capsule] 90 capsule 10     Sig: TAKE 1 CAPSULE EVERY DAY       Prior labs and Blood pressures:  BP Readings from Last 3 Encounters:   09/21/23 122/68   07/06/23 126/62   06/29/23 (!) 133/53     Lab Results   Component Value Date/Time     07/06/2023 08:51 AM    K 4.0 07/06/2023 08:51 AM     07/06/2023 08:51 AM    CO2 30 07/06/2023 08:51 AM    BUN 18 07/06/2023 08:51 AM    GFRAA 60 07/25/2022 06:25 PM     No results found for: \"HBA1C\", \"HPX5VYGH\"  Lab Results   Component Value Date/Time    CHOL 112 07/06/2023 08:51 AM    HDL 49 07/06/2023 08:51 AM     No results found for: \"VITD3\", \"VD3RIA\"    Lab Results   Component Value Date/Time    TSH 1.91 03/20/2023 04:02 PM

## 2023-10-09 NOTE — PROGRESS NOTES
Patient Name: Homar Berrios   MRN: [id number]    Oneil Marienlli is a 68 y.o. female who presents with the following:     Reports 2-day history of urinary frequency and urgency. Thinks that she might have a UTI. Continues to have an ongoing sore throat and bilateral ear pain. I previously saw her on September 21 where I empirically gave her 5 days amoxicillin. Her strep culture came back negative. Review of Systems   Constitutional:  Negative for fatigue, fever and unexpected weight change. HENT:  Positive for ear pain and sore throat. Negative for congestion, hearing loss and sinus pain. Eyes:  Negative for pain, discharge, itching and visual disturbance. Respiratory:  Negative for cough, chest tightness, shortness of breath and wheezing. Cardiovascular:  Negative for chest pain, palpitations and leg swelling. Gastrointestinal:  Negative for abdominal pain, blood in stool, constipation, diarrhea, nausea and vomiting. Genitourinary:  Positive for frequency and urgency. Negative for dysuria and flank pain. Skin:  Negative for rash. Neurological:  Negative for dizziness, seizures, weakness and headaches. Psychiatric/Behavioral:  Negative for dysphoric mood, sleep disturbance and suicidal ideas. The patient is not nervous/anxious. All other systems reviewed and are negative. The patient's medications, allergies, past medical history, surgical history, family history and social history were reviewed and updated where appropriate.     Current Outpatient Medications on File Prior to Visit   Medication Sig Dispense Refill    hydroCHLOROthiazide (MICROZIDE) 12.5 MG capsule TAKE 1 CAPSULE EVERY DAY 90 capsule 1    simvastatin (ZOCOR) 40 MG tablet TAKE 1 TABLET EVERY NIGHT 90 tablet 1    triamcinolone (NASACORT) 55 MCG/ACT nasal inhaler 2 sprays by Each Nostril route daily 1 each 0    haloperidol (HALDOL) 0.5 MG tablet       aspirin 325 MG tablet Take by mouth daily

## 2023-10-11 LAB
BACTERIA SPEC CULT: NORMAL
CC UR VC: NORMAL
SERVICE CMNT-IMP: NORMAL

## 2023-10-21 ENCOUNTER — APPOINTMENT (OUTPATIENT)
Facility: HOSPITAL | Age: 73
End: 2023-10-21
Payer: MEDICARE

## 2023-10-21 ENCOUNTER — HOSPITAL ENCOUNTER (EMERGENCY)
Facility: HOSPITAL | Age: 73
Discharge: HOME | End: 2023-10-21
Attending: EMERGENCY MEDICINE
Payer: MEDICARE

## 2023-10-21 VITALS
TEMPERATURE: 98.1 F | RESPIRATION RATE: 13 BRPM | SYSTOLIC BLOOD PRESSURE: 135 MMHG | OXYGEN SATURATION: 97 % | WEIGHT: 179.68 LBS | DIASTOLIC BLOOD PRESSURE: 65 MMHG | HEART RATE: 69 BPM | BODY MASS INDEX: 32.86 KG/M2

## 2023-10-21 DIAGNOSIS — I95.1 ORTHOSTATIC HYPOTENSION: ICD-10-CM

## 2023-10-21 DIAGNOSIS — R42 LIGHTHEADEDNESS: ICD-10-CM

## 2023-10-21 DIAGNOSIS — R55 PRE-SYNCOPE: Primary | ICD-10-CM

## 2023-10-21 DIAGNOSIS — R07.89 ATYPICAL CHEST PAIN: ICD-10-CM

## 2023-10-21 LAB
ALBUMIN SERPL-MCNC: 3.7 G/DL (ref 3.5–5)
ALBUMIN/GLOB SERPL: 0.9 (ref 1.1–2.2)
ALP SERPL-CCNC: 67 U/L (ref 45–117)
ALT SERPL-CCNC: 32 U/L (ref 12–78)
ANION GAP SERPL CALC-SCNC: 3 MMOL/L (ref 5–15)
APPEARANCE UR: CLEAR
AST SERPL-CCNC: 27 U/L (ref 15–37)
BACTERIA URNS QL MICRO: NEGATIVE /HPF
BASOPHILS # BLD: 0.1 K/UL (ref 0–0.1)
BASOPHILS NFR BLD: 1 % (ref 0–1)
BILIRUB SERPL-MCNC: 0.6 MG/DL (ref 0.2–1)
BILIRUB UR QL: NEGATIVE
BUN SERPL-MCNC: 15 MG/DL (ref 6–20)
BUN/CREAT SERPL: 15 (ref 12–20)
CALCIUM SERPL-MCNC: 9.6 MG/DL (ref 8.5–10.1)
CHLORIDE SERPL-SCNC: 105 MMOL/L (ref 97–108)
CO2 SERPL-SCNC: 31 MMOL/L (ref 21–32)
COLOR UR: NORMAL
CREAT SERPL-MCNC: 0.97 MG/DL (ref 0.55–1.02)
DIFFERENTIAL METHOD BLD: ABNORMAL
EKG ATRIAL RATE: 73 BPM
EKG DIAGNOSIS: NORMAL
EKG P AXIS: 69 DEGREES
EKG P-R INTERVAL: 156 MS
EKG Q-T INTERVAL: 414 MS
EKG QRS DURATION: 122 MS
EKG QTC CALCULATION (BAZETT): 456 MS
EKG R AXIS: -65 DEGREES
EKG T AXIS: 67 DEGREES
EKG VENTRICULAR RATE: 73 BPM
EOSINOPHIL # BLD: 0.1 K/UL (ref 0–0.4)
EOSINOPHIL NFR BLD: 1 % (ref 0–7)
EPITH CASTS URNS QL MICRO: NORMAL /LPF
ERYTHROCYTE [DISTWIDTH] IN BLOOD BY AUTOMATED COUNT: 12 % (ref 11.5–14.5)
GLOBULIN SER CALC-MCNC: 3.9 G/DL (ref 2–4)
GLUCOSE BLD STRIP.AUTO-MCNC: 92 MG/DL (ref 65–117)
GLUCOSE SERPL-MCNC: 85 MG/DL (ref 65–100)
GLUCOSE UR STRIP.AUTO-MCNC: NEGATIVE MG/DL
HCT VFR BLD AUTO: 42.6 % (ref 35–47)
HGB BLD-MCNC: 14.3 G/DL (ref 11.5–16)
HGB UR QL STRIP: NEGATIVE
HYALINE CASTS URNS QL MICRO: NORMAL /LPF (ref 0–2)
IMM GRANULOCYTES # BLD AUTO: 0 K/UL (ref 0–0.04)
IMM GRANULOCYTES NFR BLD AUTO: 0 % (ref 0–0.5)
INR PPP: 1.1 (ref 0.9–1.1)
KETONES UR QL STRIP.AUTO: NEGATIVE MG/DL
LEUKOCYTE ESTERASE UR QL STRIP.AUTO: NEGATIVE
LYMPHOCYTES # BLD: 1.3 K/UL (ref 0.8–3.5)
LYMPHOCYTES NFR BLD: 14 % (ref 12–49)
MCH RBC QN AUTO: 30 PG (ref 26–34)
MCHC RBC AUTO-ENTMCNC: 33.6 G/DL (ref 30–36.5)
MCV RBC AUTO: 89.5 FL (ref 80–99)
MONOCYTES # BLD: 0.7 K/UL (ref 0–1)
MONOCYTES NFR BLD: 8 % (ref 5–13)
NEUTS SEG # BLD: 7 K/UL (ref 1.8–8)
NEUTS SEG NFR BLD: 76 % (ref 32–75)
NITRITE UR QL STRIP.AUTO: NEGATIVE
NRBC # BLD: 0 K/UL (ref 0–0.01)
NRBC BLD-RTO: 0 PER 100 WBC
PH UR STRIP: 8 (ref 5–8)
PLATELET # BLD AUTO: 226 K/UL (ref 150–400)
PMV BLD AUTO: 9.7 FL (ref 8.9–12.9)
POTASSIUM SERPL-SCNC: 4 MMOL/L (ref 3.5–5.1)
PROT SERPL-MCNC: 7.6 G/DL (ref 6.4–8.2)
PROT UR STRIP-MCNC: NEGATIVE MG/DL
PROTHROMBIN TIME: 11 SEC (ref 9–11.1)
RBC # BLD AUTO: 4.76 M/UL (ref 3.8–5.2)
RBC #/AREA URNS HPF: NORMAL /HPF (ref 0–5)
SERVICE CMNT-IMP: NORMAL
SODIUM SERPL-SCNC: 139 MMOL/L (ref 136–145)
SP GR UR REFRACTOMETRY: 1.02
TROPONIN I SERPL HS-MCNC: 13 NG/L (ref 0–51)
TROPONIN I SERPL HS-MCNC: 30 NG/L (ref 0–51)
URINE CULTURE IF INDICATED: NORMAL
UROBILINOGEN UR QL STRIP.AUTO: 0.2 EU/DL (ref 0.2–1)
WBC # BLD AUTO: 9.2 K/UL (ref 3.6–11)
WBC URNS QL MICRO: NORMAL /HPF (ref 0–4)

## 2023-10-21 PROCEDURE — 96361 HYDRATE IV INFUSION ADD-ON: CPT

## 2023-10-21 PROCEDURE — 96374 THER/PROPH/DIAG INJ IV PUSH: CPT

## 2023-10-21 PROCEDURE — 82962 GLUCOSE BLOOD TEST: CPT

## 2023-10-21 PROCEDURE — 81001 URINALYSIS AUTO W/SCOPE: CPT

## 2023-10-21 PROCEDURE — 6360000004 HC RX CONTRAST MEDICATION: Performed by: EMERGENCY MEDICINE

## 2023-10-21 PROCEDURE — 85610 PROTHROMBIN TIME: CPT

## 2023-10-21 PROCEDURE — 71045 X-RAY EXAM CHEST 1 VIEW: CPT

## 2023-10-21 PROCEDURE — 6360000002 HC RX W HCPCS: Performed by: EMERGENCY MEDICINE

## 2023-10-21 PROCEDURE — 0042T CT BRAIN PERFUSION: CPT

## 2023-10-21 PROCEDURE — 99285 EMERGENCY DEPT VISIT HI MDM: CPT

## 2023-10-21 PROCEDURE — 80053 COMPREHEN METABOLIC PANEL: CPT

## 2023-10-21 PROCEDURE — 36415 COLL VENOUS BLD VENIPUNCTURE: CPT

## 2023-10-21 PROCEDURE — 2580000003 HC RX 258: Performed by: EMERGENCY MEDICINE

## 2023-10-21 PROCEDURE — 70498 CT ANGIOGRAPHY NECK: CPT

## 2023-10-21 PROCEDURE — 70450 CT HEAD/BRAIN W/O DYE: CPT

## 2023-10-21 PROCEDURE — 84484 ASSAY OF TROPONIN QUANT: CPT

## 2023-10-21 PROCEDURE — 85025 COMPLETE CBC W/AUTO DIFF WBC: CPT

## 2023-10-21 RX ORDER — 0.9 % SODIUM CHLORIDE 0.9 %
1000 INTRAVENOUS SOLUTION INTRAVENOUS ONCE
Status: COMPLETED | OUTPATIENT
Start: 2023-10-21 | End: 2023-10-21

## 2023-10-21 RX ORDER — 0.9 % SODIUM CHLORIDE 0.9 %
500 INTRAVENOUS SOLUTION INTRAVENOUS ONCE
Status: COMPLETED | OUTPATIENT
Start: 2023-10-21 | End: 2023-10-21

## 2023-10-21 RX ORDER — MORPHINE SULFATE 2 MG/ML
2 INJECTION, SOLUTION INTRAMUSCULAR; INTRAVENOUS
Status: COMPLETED | OUTPATIENT
Start: 2023-10-21 | End: 2023-10-21

## 2023-10-21 RX ADMIN — MORPHINE SULFATE 2 MG: 2 INJECTION, SOLUTION INTRAMUSCULAR; INTRAVENOUS at 16:37

## 2023-10-21 RX ADMIN — IOPAMIDOL 100 ML: 755 INJECTION, SOLUTION INTRAVENOUS at 13:48

## 2023-10-21 RX ADMIN — SODIUM CHLORIDE 500 ML: 9 INJECTION, SOLUTION INTRAVENOUS at 16:37

## 2023-10-21 RX ADMIN — SODIUM CHLORIDE 1000 ML: 9 INJECTION, SOLUTION INTRAVENOUS at 16:37

## 2023-10-21 RX ADMIN — IOPAMIDOL 40 ML: 755 INJECTION, SOLUTION INTRAVENOUS at 13:52

## 2023-10-21 ASSESSMENT — PAIN SCALES - GENERAL
PAINLEVEL_OUTOF10: 7
PAINLEVEL_OUTOF10: 8

## 2023-10-21 ASSESSMENT — PAIN DESCRIPTION - LOCATION: LOCATION: CHEST

## 2023-10-21 ASSESSMENT — HEART SCORE: ECG: 0

## 2023-10-21 NOTE — ED NOTES
CODE S LEVEL 1 called by Dr Peter Hubbard. Pt taken to CT. [facility] 0. Denies any unilateral numbness, tingling.      Herbie Holden RN  10/21/23 2446

## 2023-10-21 NOTE — ED NOTES
Pt discharged by [facility] RN. Discharge instructions discussed and pt given opportunity to ask questions.  Pt ambulatory out of ED        Fay Andrade RN  10/21/23 1937

## 2023-12-21 ENCOUNTER — HOSPITAL ENCOUNTER (OUTPATIENT)
Facility: HOSPITAL | Age: 73
Discharge: HOME | End: 2023-12-24
Attending: OTOLARYNGOLOGY
Payer: MEDICARE

## 2023-12-21 DIAGNOSIS — J32.4 CHRONIC PANSINUSITIS: ICD-10-CM

## 2023-12-21 PROCEDURE — 70486 CT MAXILLOFACIAL W/O DYE: CPT

## 2024-01-05 SDOH — HEALTH STABILITY: PHYSICAL HEALTH: ON AVERAGE, HOW MANY MINUTES DO YOU ENGAGE IN EXERCISE AT THIS LEVEL?: 0 MIN

## 2024-01-05 SDOH — HEALTH STABILITY: PHYSICAL HEALTH: ON AVERAGE, HOW MANY DAYS PER WEEK DO YOU ENGAGE IN MODERATE TO STRENUOUS EXERCISE (LIKE A BRISK WALK)?: 0 DAYS

## 2024-01-05 ASSESSMENT — PATIENT HEALTH QUESTIONNAIRE - PHQ9
5. POOR APPETITE OR OVEREATING: 0
4. FEELING TIRED OR HAVING LITTLE ENERGY: 2
SUM OF ALL RESPONSES TO PHQ9 QUESTIONS 1 & 2: 2
SUM OF ALL RESPONSES TO PHQ QUESTIONS 1-9: 4
2. FEELING DOWN, DEPRESSED OR HOPELESS: 1
3. TROUBLE FALLING OR STAYING ASLEEP: 0
10. IF YOU CHECKED OFF ANY PROBLEMS, HOW DIFFICULT HAVE THESE PROBLEMS MADE IT FOR YOU TO DO YOUR WORK, TAKE CARE OF THINGS AT HOME, OR GET ALONG WITH OTHER PEOPLE: 0
7. TROUBLE CONCENTRATING ON THINGS, SUCH AS READING THE NEWSPAPER OR WATCHING TELEVISION: 0
1. LITTLE INTEREST OR PLEASURE IN DOING THINGS: 1
6. FEELING BAD ABOUT YOURSELF - OR THAT YOU ARE A FAILURE OR HAVE LET YOURSELF OR YOUR FAMILY DOWN: 0
SUM OF ALL RESPONSES TO PHQ QUESTIONS 1-9: 4
8. MOVING OR SPEAKING SO SLOWLY THAT OTHER PEOPLE COULD HAVE NOTICED. OR THE OPPOSITE, BEING SO FIGETY OR RESTLESS THAT YOU HAVE BEEN MOVING AROUND A LOT MORE THAN USUAL: 0
9. THOUGHTS THAT YOU WOULD BE BETTER OFF DEAD, OR OF HURTING YOURSELF: 0
SUM OF ALL RESPONSES TO PHQ QUESTIONS 1-9: 4
SUM OF ALL RESPONSES TO PHQ QUESTIONS 1-9: 4

## 2024-01-05 ASSESSMENT — LIFESTYLE VARIABLES
HOW OFTEN DO YOU HAVE A DRINK CONTAINING ALCOHOL: NEVER
HOW OFTEN DO YOU HAVE SIX OR MORE DRINKS ON ONE OCCASION: 1
HOW OFTEN DO YOU HAVE A DRINK CONTAINING ALCOHOL: 1
HOW MANY STANDARD DRINKS CONTAINING ALCOHOL DO YOU HAVE ON A TYPICAL DAY: PATIENT DOES NOT DRINK
HOW MANY STANDARD DRINKS CONTAINING ALCOHOL DO YOU HAVE ON A TYPICAL DAY: 0

## 2024-01-08 ENCOUNTER — OFFICE VISIT (OUTPATIENT)
Age: 74
End: 2024-01-08
Payer: MEDICARE

## 2024-01-08 VITALS
OXYGEN SATURATION: 98 % | BODY MASS INDEX: 31.73 KG/M2 | DIASTOLIC BLOOD PRESSURE: 72 MMHG | HEART RATE: 60 BPM | RESPIRATION RATE: 14 BRPM | WEIGHT: 172.4 LBS | SYSTOLIC BLOOD PRESSURE: 128 MMHG | HEIGHT: 62 IN | TEMPERATURE: 97.8 F

## 2024-01-08 DIAGNOSIS — F20.9 SCHIZOPHRENIA, UNSPECIFIED TYPE (HCC): ICD-10-CM

## 2024-01-08 DIAGNOSIS — R13.10 DYSPHAGIA, UNSPECIFIED TYPE: ICD-10-CM

## 2024-01-08 DIAGNOSIS — Z00.00 ENCOUNTER FOR MEDICARE ANNUAL WELLNESS EXAM: Primary | ICD-10-CM

## 2024-01-08 DIAGNOSIS — R73.03 PREDIABETES: ICD-10-CM

## 2024-01-08 DIAGNOSIS — E78.5 HYPERLIPIDEMIA, UNSPECIFIED HYPERLIPIDEMIA TYPE: ICD-10-CM

## 2024-01-08 DIAGNOSIS — I10 ESSENTIAL (PRIMARY) HYPERTENSION: ICD-10-CM

## 2024-01-08 PROBLEM — N18.30 CHRONIC RENAL DISEASE, STAGE III (HCC): Status: RESOLVED | Noted: 2022-07-27 | Resolved: 2024-01-08

## 2024-01-08 LAB
ALBUMIN SERPL-MCNC: 3.9 G/DL (ref 3.5–5)
ALBUMIN/GLOB SERPL: 1.2 (ref 1.1–2.2)
ALP SERPL-CCNC: 63 U/L (ref 45–117)
ALT SERPL-CCNC: 37 U/L (ref 12–78)
ANION GAP SERPL CALC-SCNC: 1 MMOL/L (ref 5–15)
AST SERPL-CCNC: 27 U/L (ref 15–37)
BILIRUB SERPL-MCNC: 0.8 MG/DL (ref 0.2–1)
BUN SERPL-MCNC: 24 MG/DL (ref 6–20)
BUN/CREAT SERPL: 21 (ref 12–20)
CALCIUM SERPL-MCNC: 9.3 MG/DL (ref 8.5–10.1)
CHLORIDE SERPL-SCNC: 104 MMOL/L (ref 97–108)
CHOLEST SERPL-MCNC: 108 MG/DL
CO2 SERPL-SCNC: 33 MMOL/L (ref 21–32)
CREAT SERPL-MCNC: 1.17 MG/DL (ref 0.55–1.02)
ERYTHROCYTE [DISTWIDTH] IN BLOOD BY AUTOMATED COUNT: 11.8 % (ref 11.5–14.5)
EST. AVERAGE GLUCOSE BLD GHB EST-MCNC: 97 MG/DL
GLOBULIN SER CALC-MCNC: 3.3 G/DL (ref 2–4)
GLUCOSE SERPL-MCNC: 97 MG/DL (ref 65–100)
HBA1C MFR BLD: 5 % (ref 4–5.6)
HCT VFR BLD AUTO: 42 % (ref 35–47)
HDLC SERPL-MCNC: 45 MG/DL
HDLC SERPL: 2.4 (ref 0–5)
HGB BLD-MCNC: 14.4 G/DL (ref 11.5–16)
LDLC SERPL CALC-MCNC: 48.2 MG/DL (ref 0–100)
MCH RBC QN AUTO: 29.9 PG (ref 26–34)
MCHC RBC AUTO-ENTMCNC: 34.3 G/DL (ref 30–36.5)
MCV RBC AUTO: 87.1 FL (ref 80–99)
NRBC # BLD: 0 K/UL (ref 0–0.01)
NRBC BLD-RTO: 0 PER 100 WBC
PLATELET # BLD AUTO: 265 K/UL (ref 150–400)
PMV BLD AUTO: 10 FL (ref 8.9–12.9)
POTASSIUM SERPL-SCNC: 4.3 MMOL/L (ref 3.5–5.1)
PROT SERPL-MCNC: 7.2 G/DL (ref 6.4–8.2)
RBC # BLD AUTO: 4.82 M/UL (ref 3.8–5.2)
SODIUM SERPL-SCNC: 138 MMOL/L (ref 136–145)
TRIGL SERPL-MCNC: 74 MG/DL
VLDLC SERPL CALC-MCNC: 14.8 MG/DL
WBC # BLD AUTO: 7.7 K/UL (ref 3.6–11)

## 2024-01-08 PROCEDURE — 3017F COLORECTAL CA SCREEN DOC REV: CPT | Performed by: FAMILY MEDICINE

## 2024-01-08 PROCEDURE — 1123F ACP DISCUSS/DSCN MKR DOCD: CPT | Performed by: FAMILY MEDICINE

## 2024-01-08 PROCEDURE — G8427 DOCREV CUR MEDS BY ELIG CLIN: HCPCS | Performed by: FAMILY MEDICINE

## 2024-01-08 PROCEDURE — 99214 OFFICE O/P EST MOD 30 MIN: CPT | Performed by: FAMILY MEDICINE

## 2024-01-08 PROCEDURE — 3074F SYST BP LT 130 MM HG: CPT | Performed by: FAMILY MEDICINE

## 2024-01-08 PROCEDURE — 1090F PRES/ABSN URINE INCON ASSESS: CPT | Performed by: FAMILY MEDICINE

## 2024-01-08 PROCEDURE — G8484 FLU IMMUNIZE NO ADMIN: HCPCS | Performed by: FAMILY MEDICINE

## 2024-01-08 PROCEDURE — G8417 CALC BMI ABV UP PARAM F/U: HCPCS | Performed by: FAMILY MEDICINE

## 2024-01-08 PROCEDURE — G0439 PPPS, SUBSEQ VISIT: HCPCS | Performed by: FAMILY MEDICINE

## 2024-01-08 PROCEDURE — 3078F DIAST BP <80 MM HG: CPT | Performed by: FAMILY MEDICINE

## 2024-01-08 PROCEDURE — G8399 PT W/DXA RESULTS DOCUMENT: HCPCS | Performed by: FAMILY MEDICINE

## 2024-01-08 PROCEDURE — 1036F TOBACCO NON-USER: CPT | Performed by: FAMILY MEDICINE

## 2024-01-08 RX ORDER — FAMOTIDINE 20 MG/1
20 TABLET, FILM COATED ORAL DAILY
Qty: 30 TABLET | Refills: 2 | Status: SHIPPED | OUTPATIENT
Start: 2024-01-08

## 2024-01-08 ASSESSMENT — ENCOUNTER SYMPTOMS
ABDOMINAL PAIN: 0
WHEEZING: 0
SHORTNESS OF BREATH: 0
CONSTIPATION: 0
DIARRHEA: 0
VOMITING: 1
CHEST TIGHTNESS: 0
NAUSEA: 0
COUGH: 0

## 2024-01-08 NOTE — PROGRESS NOTES
Chief Complaint   Patient presents with    Medicare AWV     \"Have you been to the ER, urgent care clinic since your last visit?  Hospitalized since your last visit?\"    Yes. [facility] - 10/21/23 dehydration     “Have you seen or consulted any other health care providers outside of [facility] since your last visit?”    Yes. ENT- Dr. Jose L kraus.          Financial Resource Strain: Low Risk  (7/3/2023)    Overall Financial Resource Strain (CARDIA)     Difficulty of Paying Living Expenses: Not hard at all      Food Insecurity: Not on file (7/3/2023)            1/5/2024    12:40 PM   PHQ-9    Little interest or pleasure in doing things 1   Feeling down, depressed, or hopeless 1   PHQ-2 Score 2   PHQ-9 Total Score 2       Health Maintenance Due   Topic Date Due    Respiratory Syncytial Virus (RSV) Pregnant or age 60 yrs+ (1 - 1-dose 60+ series) Never done    Hepatitis B vaccine (3 of 3 - 19+ 3-dose series) 03/09/2016    Pneumococcal 65+ years Vaccine (2 - PCV) 07/22/2017    Flu vaccine (1) 08/01/2023    COVID-19 Vaccine (5 - 2023-24 season) 09/01/2023    Annual Wellness Visit (Medicare Advantage)  01/01/2024             
Hx  Fam Hx           
today for:    1. Encounter for Medicare annual wellness exam    2. Schizophrenia, unspecified type (HCC)  Stable, continue current treatment per medication list.    3. Essential (primary) hypertension  Stable, continue current treatment per medication list.    4. Prediabetes  - Hemoglobin A1C; Future  - Hemoglobin A1C    5. Hyperlipidemia, unspecified hyperlipidemia type  - CBC; Future  - Comprehensive Metabolic Panel; Future  - Lipid Panel; Future  - Lipid Panel  - Comprehensive Metabolic Panel  - CBC    6. Dysphagia, unspecified type  Recommend H2 blocker x 1 month; if no improvement, pt to see GI.  - famotidine (PEPCID) 20 MG tablet; Take 1 tablet by mouth daily  Dispense: 30 tablet; Refill: 2    Orders Placed This Encounter   Medications    famotidine (PEPCID) 20 MG tablet     Sig: Take 1 tablet by mouth daily     Dispense:  30 tablet     Refill:  2        Medications Discontinued During This Encounter   Medication Reason    triamcinolone (NASACORT) 55 MCG/ACT nasal inhaler ERROR       Return in about 6 months (around 7/8/2024) for HTN.    Treatment risks/benefits/costs/interactions/alternatives discussed with patient.  Advised patient to call back or return to office if symptoms worsen/change/persist. If patient cannot reach us or should anything more severe/urgent arise he/she should proceed directly to the nearest emergency department.  Discussed expected course/resolution/complications of diagnosis in detail with patient.  Patient expressed understanding with the diagnosis and plan.     This dictation may have been completed with Dragon, the computer voice recognition software.  Unanticipated grammatical, syntax, homophones, and other interpretive errors are sometimes inadvertently transcribed by the computer software.  Please disregard any errors that have escaped final proofreading.      Tani Weiss M.D.

## 2024-01-15 ENCOUNTER — OFFICE VISIT (OUTPATIENT)
Age: 74
End: 2024-01-15
Payer: MEDICARE

## 2024-01-15 VITALS
TEMPERATURE: 97 F | BODY MASS INDEX: 32.02 KG/M2 | OXYGEN SATURATION: 98 % | HEIGHT: 62 IN | HEART RATE: 63 BPM | SYSTOLIC BLOOD PRESSURE: 126 MMHG | DIASTOLIC BLOOD PRESSURE: 54 MMHG | WEIGHT: 174 LBS

## 2024-01-15 DIAGNOSIS — K75.81 NASH (NONALCOHOLIC STEATOHEPATITIS): Primary | ICD-10-CM

## 2024-01-15 PROCEDURE — 99214 OFFICE O/P EST MOD 30 MIN: CPT | Performed by: NURSE PRACTITIONER

## 2024-01-15 PROCEDURE — G8399 PT W/DXA RESULTS DOCUMENT: HCPCS | Performed by: NURSE PRACTITIONER

## 2024-01-15 PROCEDURE — 1090F PRES/ABSN URINE INCON ASSESS: CPT | Performed by: NURSE PRACTITIONER

## 2024-01-15 PROCEDURE — 91200 LIVER ELASTOGRAPHY: CPT | Performed by: NURSE PRACTITIONER

## 2024-01-15 PROCEDURE — G8417 CALC BMI ABV UP PARAM F/U: HCPCS | Performed by: NURSE PRACTITIONER

## 2024-01-15 PROCEDURE — 3078F DIAST BP <80 MM HG: CPT | Performed by: NURSE PRACTITIONER

## 2024-01-15 PROCEDURE — 1123F ACP DISCUSS/DSCN MKR DOCD: CPT | Performed by: NURSE PRACTITIONER

## 2024-01-15 PROCEDURE — G8427 DOCREV CUR MEDS BY ELIG CLIN: HCPCS | Performed by: NURSE PRACTITIONER

## 2024-01-15 PROCEDURE — 3017F COLORECTAL CA SCREEN DOC REV: CPT | Performed by: NURSE PRACTITIONER

## 2024-01-15 PROCEDURE — 1036F TOBACCO NON-USER: CPT | Performed by: NURSE PRACTITIONER

## 2024-01-15 PROCEDURE — 3074F SYST BP LT 130 MM HG: CPT | Performed by: NURSE PRACTITIONER

## 2024-01-15 PROCEDURE — G8484 FLU IMMUNIZE NO ADMIN: HCPCS | Performed by: NURSE PRACTITIONER

## 2024-01-15 NOTE — PROGRESS NOTES
Identified pt with two pt identifiers(name and ). Reviewed record in preparation for visit and have obtained necessary documentation.    Chief Complaint   Patient presents with    Other     FIBROSCAN     BP (!) 126/54 (Site: Left Upper Arm, Position: Sitting, Cuff Size: Large Adult)   Pulse 63   Temp 97 °F (36.1 °C) (Temporal)   Ht 1.575 m (5' 2\")   Wt 78.9 kg (174 lb)   SpO2 98%   BMI 31.83 kg/m²       1. \"Have you been to the ER, urgent care clinic since your last visit?  Hospitalized since your last visit?\"  Yes PER pt for she thought she was having a heart attack in 10/2023 at [facility]    2. \"Have you seen or consulted any other health care providers outside of the [facility] since your last visit?\" ] yes PER pt she saw her PCP last Monday for  a check up visit    Patient is accompanied by    I have received verbal consent from Yanet Posada to discuss any/all medical information while they are present in the room.        
inflammation, mild ballooning, Stage 2 fibrosis.  KEYSHA (211).  12/2022.  FibroScan performed at [facility]. EkPa was 5.3.  IQR/med 13%.  .   The results suggested a fibrosis level of F0. The CAP score suggests there is hepatic steatosis.    1/2024.  FibroScan performed at [facility]. EkPa was 4.6.  IQR/med 29%.  .   The results suggested a fibrosis level of F0. The CAP score suggests there is hepatic steatosis.      ENDOSCOPIC PROCEDURES:  Not available or performed    RADIOLOGY:  1/2022.  Ultrasound of liver.  Echogenic consistent with fatty liver.  No liver mass lesions.  No dilated bile ducts.  No ascites.  2/2022.  CT scan abdomen with IV contrast.  Changes consistent with fatty liver.  No liver mass lesions.  Normal spleen.  No ascites.    OTHER TESTING:  Not available or performed    FOLLOW-UP:  All of the issues listed above in the Assessment and Plan were discussed with the patient.  All questions were answered.  The patient expressed a clear understanding of the above.    Follow-up [facility] in 1 year for a Fibroscan. If the liver enzymes and Fibroscan remain normal no further follow-up will be needed.     ASHLEY DollNP-BC  [address], [address]  [place]  [address] [phone]  [facility]

## 2024-03-04 RX ORDER — SIMVASTATIN 40 MG
TABLET ORAL
Qty: 90 TABLET | Refills: 3 | Status: SHIPPED | OUTPATIENT
Start: 2024-03-04

## 2024-03-04 NOTE — TELEPHONE ENCOUNTER
PCP: Tani Weiss MD    Last appt: 1/8/2024       Future Appointments   Date Time Provider Department Center   6/17/2024 10:00 AM Mark Dangelo PSYD [facility] BS AMB   1/13/2025  8:30 AM Dilia Lewis, APRN - NP [facility] BS AMB       Requested Prescriptions     Pending Prescriptions Disp Refills    simvastatin (ZOCOR) 40 MG tablet [Pharmacy Med Name: SIMVASTATIN 40 MG Tablet] 90 tablet 3     Sig: TAKE 1 TABLET EVERY NIGHT       Prior labs and Blood pressures:  BP Readings from Last 3 Encounters:   01/15/24 (!) 126/54   01/08/24 128/72   10/21/23 135/65     Lab Results   Component Value Date/Time     01/08/2024 10:06 AM    K 4.3 01/08/2024 10:06 AM     01/08/2024 10:06 AM    CO2 33 01/08/2024 10:06 AM    BUN 24 01/08/2024 10:06 AM    GFRAA 60 07/25/2022 06:25 PM     No results found for: \"HBA1C\", \"FDN0PLMS\"  Lab Results   Component Value Date/Time    CHOL 108 01/08/2024 10:06 AM    HDL 45 01/08/2024 10:06 AM     No results found for: \"VITD3\", \"VD3RIA\"    Lab Results   Component Value Date/Time    TSH 1.91 03/20/2023 04:02 PM

## 2024-03-18 ENCOUNTER — TRANSCRIBE ORDERS (OUTPATIENT)
Facility: HOSPITAL | Age: 74
End: 2024-03-18

## 2024-03-18 DIAGNOSIS — Z12.31 VISIT FOR SCREENING MAMMOGRAM: Primary | ICD-10-CM

## 2024-03-22 ENCOUNTER — HOSPITAL ENCOUNTER (OUTPATIENT)
Facility: HOSPITAL | Age: 74
End: 2024-03-22
Payer: MEDICARE

## 2024-03-22 VITALS — HEIGHT: 62 IN | WEIGHT: 177 LBS | BODY MASS INDEX: 32.57 KG/M2

## 2024-03-22 DIAGNOSIS — Z12.31 VISIT FOR SCREENING MAMMOGRAM: ICD-10-CM

## 2024-03-22 PROCEDURE — 77063 BREAST TOMOSYNTHESIS BI: CPT

## 2024-04-04 DIAGNOSIS — R13.10 DYSPHAGIA, UNSPECIFIED TYPE: ICD-10-CM

## 2024-04-04 RX ORDER — FAMOTIDINE 20 MG/1
20 TABLET, FILM COATED ORAL DAILY
Qty: 30 TABLET | Refills: 0 | Status: SHIPPED | OUTPATIENT
Start: 2024-04-04

## 2024-04-04 NOTE — TELEPHONE ENCOUNTER
PCP: Tani Weiss MD    Last appt: 1/8/2024       Future Appointments   Date Time Provider Department Center   6/17/2024 10:00 AM Mark Dangelo PSYD [facility] BS AMB   1/13/2025  8:30 AM Dilia Lewis, APRN - NP [facility] BS AMB       Requested Prescriptions      No prescriptions requested or ordered in this encounter       Prior labs and Blood pressures:  BP Readings from Last 3 Encounters:   01/15/24 (!) 126/54   01/08/24 128/72   10/21/23 135/65     Lab Results   Component Value Date/Time     01/08/2024 10:06 AM    K 4.3 01/08/2024 10:06 AM     01/08/2024 10:06 AM    CO2 33 01/08/2024 10:06 AM    BUN 24 01/08/2024 10:06 AM    GFRAA 60 07/25/2022 06:25 PM     No results found for: \"HBA1C\", \"THU3GVEF\"  Lab Results   Component Value Date/Time    CHOL 108 01/08/2024 10:06 AM    HDL 45 01/08/2024 10:06 AM     No results found for: \"VITD3\", \"VD3RIA\"    Lab Results   Component Value Date/Time    TSH 1.91 03/20/2023 04:02 PM

## 2024-05-06 DIAGNOSIS — R13.10 DYSPHAGIA, UNSPECIFIED TYPE: ICD-10-CM

## 2024-05-06 RX ORDER — FAMOTIDINE 20 MG/1
20 TABLET, FILM COATED ORAL DAILY
Qty: 90 TABLET | Refills: 1 | Status: SHIPPED | OUTPATIENT
Start: 2024-05-06

## 2024-05-06 NOTE — TELEPHONE ENCOUNTER
Last appointment: 1/8/24 Isela  Next appointment: None- due July  Previous refill encounter(s): 4/4/24 30 (by covering provider)    Requested Prescriptions     Pending Prescriptions Disp Refills    famotidine (PEPCID) 20 MG tablet 90 tablet 1     Sig: Take 1 tablet by mouth daily     For Pharmacy Admin Tracking Only    Program: Medication Refill  CPA in place:    Recommendation Provided To:   Intervention Detail: New Rx: 1, reason: Patient Preference  Intervention Accepted By:   Gap Closed?:    Time Spent (min): 5

## 2024-05-07 ENCOUNTER — APPOINTMENT (OUTPATIENT)
Facility: HOSPITAL | Age: 74
DRG: 871 | End: 2024-05-07
Payer: MEDICARE

## 2024-05-07 ENCOUNTER — HOSPITAL ENCOUNTER (INPATIENT)
Facility: HOSPITAL | Age: 74
LOS: 5 days | Discharge: HOME | DRG: 871 | End: 2024-05-12
Attending: EMERGENCY MEDICINE | Admitting: INTERNAL MEDICINE
Payer: MEDICARE

## 2024-05-07 DIAGNOSIS — R41.82 ALTERED MENTAL STATUS, UNSPECIFIED ALTERED MENTAL STATUS TYPE: Primary | ICD-10-CM

## 2024-05-07 DIAGNOSIS — E87.1 HYPONATREMIA: ICD-10-CM

## 2024-05-07 LAB
ALBUMIN SERPL-MCNC: 3.2 G/DL (ref 3.5–5)
ALBUMIN/GLOB SERPL: 0.7 (ref 1.1–2.2)
ALP SERPL-CCNC: 63 U/L (ref 45–117)
ALT SERPL-CCNC: 127 U/L (ref 12–78)
ANION GAP SERPL CALC-SCNC: 10 MMOL/L (ref 5–15)
ANION GAP SERPL CALC-SCNC: 8 MMOL/L (ref 5–15)
AST SERPL-CCNC: 141 U/L (ref 15–37)
B PERT DNA SPEC QL NAA+PROBE: NOT DETECTED
BASOPHILS # BLD: 0.1 K/UL (ref 0–0.1)
BASOPHILS NFR BLD: 0 % (ref 0–1)
BILIRUB SERPL-MCNC: 1.6 MG/DL (ref 0.2–1)
BORDETELLA PARAPERTUSSIS BY PCR: NOT DETECTED
BUN SERPL-MCNC: 13 MG/DL (ref 6–20)
BUN SERPL-MCNC: 15 MG/DL (ref 6–20)
BUN/CREAT SERPL: 12 (ref 12–20)
BUN/CREAT SERPL: 13 (ref 12–20)
C PNEUM DNA SPEC QL NAA+PROBE: NOT DETECTED
CALCIUM SERPL-MCNC: 8.5 MG/DL (ref 8.5–10.1)
CALCIUM SERPL-MCNC: 9.1 MG/DL (ref 8.5–10.1)
CHLORIDE SERPL-SCNC: 86 MMOL/L (ref 97–108)
CHLORIDE SERPL-SCNC: 90 MMOL/L (ref 97–108)
CO2 SERPL-SCNC: 26 MMOL/L (ref 21–32)
CO2 SERPL-SCNC: 26 MMOL/L (ref 21–32)
COMMENT:: NORMAL
COMMENT:: NORMAL
CREAT SERPL-MCNC: 1.02 MG/DL (ref 0.55–1.02)
CREAT SERPL-MCNC: 1.24 MG/DL (ref 0.55–1.02)
DIFFERENTIAL METHOD BLD: ABNORMAL
EKG ATRIAL RATE: 84 BPM
EKG DIAGNOSIS: NORMAL
EKG P AXIS: 39 DEGREES
EKG P-R INTERVAL: 146 MS
EKG Q-T INTERVAL: 392 MS
EKG QRS DURATION: 128 MS
EKG QTC CALCULATION (BAZETT): 463 MS
EKG R AXIS: -59 DEGREES
EKG T AXIS: 28 DEGREES
EKG VENTRICULAR RATE: 84 BPM
EOSINOPHIL # BLD: 0 K/UL (ref 0–0.4)
EOSINOPHIL NFR BLD: 0 % (ref 0–7)
ERYTHROCYTE [DISTWIDTH] IN BLOOD BY AUTOMATED COUNT: 12.1 % (ref 11.5–14.5)
FLUAV SUBTYP SPEC NAA+PROBE: NOT DETECTED
FLUBV RNA SPEC QL NAA+PROBE: NOT DETECTED
GLOBULIN SER CALC-MCNC: 4.5 G/DL (ref 2–4)
GLUCOSE BLD STRIP.AUTO-MCNC: 142 MG/DL (ref 65–117)
GLUCOSE SERPL-MCNC: 113 MG/DL (ref 65–100)
GLUCOSE SERPL-MCNC: 113 MG/DL (ref 65–100)
HADV DNA SPEC QL NAA+PROBE: NOT DETECTED
HCOV 229E RNA SPEC QL NAA+PROBE: NOT DETECTED
HCOV HKU1 RNA SPEC QL NAA+PROBE: NOT DETECTED
HCOV NL63 RNA SPEC QL NAA+PROBE: NOT DETECTED
HCOV OC43 RNA SPEC QL NAA+PROBE: NOT DETECTED
HCT VFR BLD AUTO: 39.1 % (ref 35–47)
HGB BLD-MCNC: 14.1 G/DL (ref 11.5–16)
HMPV RNA SPEC QL NAA+PROBE: NOT DETECTED
HPIV1 RNA SPEC QL NAA+PROBE: NOT DETECTED
HPIV2 RNA SPEC QL NAA+PROBE: NOT DETECTED
HPIV3 RNA SPEC QL NAA+PROBE: NOT DETECTED
HPIV4 RNA SPEC QL NAA+PROBE: NOT DETECTED
IMM GRANULOCYTES # BLD AUTO: 0.1 K/UL (ref 0–0.04)
IMM GRANULOCYTES NFR BLD AUTO: 1 % (ref 0–0.5)
LACTATE SERPL-SCNC: 1.6 MMOL/L (ref 0.4–2)
LYMPHOCYTES # BLD: 0.9 K/UL (ref 0.8–3.5)
LYMPHOCYTES NFR BLD: 6 % (ref 12–49)
M PNEUMO DNA SPEC QL NAA+PROBE: NOT DETECTED
MAGNESIUM SERPL-MCNC: 2.1 MG/DL (ref 1.6–2.4)
MCH RBC QN AUTO: 30.4 PG (ref 26–34)
MCHC RBC AUTO-ENTMCNC: 36.1 G/DL (ref 30–36.5)
MCV RBC AUTO: 84.3 FL (ref 80–99)
MONOCYTES # BLD: 1.6 K/UL (ref 0–1)
MONOCYTES NFR BLD: 10 % (ref 5–13)
NEUTS SEG # BLD: 13.5 K/UL (ref 1.8–8)
NEUTS SEG NFR BLD: 83 % (ref 32–75)
NRBC # BLD: 0 K/UL (ref 0–0.01)
NRBC BLD-RTO: 0 PER 100 WBC
PLATELET # BLD AUTO: 167 K/UL (ref 150–400)
PMV BLD AUTO: 10.3 FL (ref 8.9–12.9)
POTASSIUM SERPL-SCNC: 3 MMOL/L (ref 3.5–5.1)
POTASSIUM SERPL-SCNC: 3 MMOL/L (ref 3.5–5.1)
PROCALCITONIN SERPL-MCNC: 0.88 NG/ML
PROT SERPL-MCNC: 7.7 G/DL (ref 6.4–8.2)
RBC # BLD AUTO: 4.64 M/UL (ref 3.8–5.2)
RSV RNA SPEC QL NAA+PROBE: NOT DETECTED
RV+EV RNA SPEC QL NAA+PROBE: NOT DETECTED
SARS-COV-2 RNA RESP QL NAA+PROBE: NOT DETECTED
SERVICE CMNT-IMP: ABNORMAL
SODIUM SERPL-SCNC: 122 MMOL/L (ref 136–145)
SODIUM SERPL-SCNC: 124 MMOL/L (ref 136–145)
SPECIMEN HOLD: NORMAL
SPECIMEN HOLD: NORMAL
TROPONIN I SERPL HS-MCNC: 51 NG/L (ref 0–51)
WBC # BLD AUTO: 16.2 K/UL (ref 3.6–11)

## 2024-05-07 PROCEDURE — 87040 BLOOD CULTURE FOR BACTERIA: CPT

## 2024-05-07 PROCEDURE — 85025 COMPLETE CBC W/AUTO DIFF WBC: CPT

## 2024-05-07 PROCEDURE — 2060000000 HC ICU INTERMEDIATE R&B

## 2024-05-07 PROCEDURE — 93005 ELECTROCARDIOGRAM TRACING: CPT

## 2024-05-07 PROCEDURE — 6360000002 HC RX W HCPCS: Performed by: INTERNAL MEDICINE

## 2024-05-07 PROCEDURE — 83605 ASSAY OF LACTIC ACID: CPT

## 2024-05-07 PROCEDURE — 80053 COMPREHEN METABOLIC PANEL: CPT

## 2024-05-07 PROCEDURE — 83735 ASSAY OF MAGNESIUM: CPT

## 2024-05-07 PROCEDURE — 76705 ECHO EXAM OF ABDOMEN: CPT

## 2024-05-07 PROCEDURE — 84145 PROCALCITONIN (PCT): CPT

## 2024-05-07 PROCEDURE — 99285 EMERGENCY DEPT VISIT HI MDM: CPT

## 2024-05-07 PROCEDURE — 93010 ELECTROCARDIOGRAM REPORT: CPT | Performed by: INTERNAL MEDICINE

## 2024-05-07 PROCEDURE — 71046 X-RAY EXAM CHEST 2 VIEWS: CPT

## 2024-05-07 PROCEDURE — 6370000000 HC RX 637 (ALT 250 FOR IP): Performed by: INTERNAL MEDICINE

## 2024-05-07 PROCEDURE — 36415 COLL VENOUS BLD VENIPUNCTURE: CPT

## 2024-05-07 PROCEDURE — 73502 X-RAY EXAM HIP UNI 2-3 VIEWS: CPT

## 2024-05-07 PROCEDURE — 84484 ASSAY OF TROPONIN QUANT: CPT

## 2024-05-07 PROCEDURE — 70450 CT HEAD/BRAIN W/O DYE: CPT

## 2024-05-07 PROCEDURE — 0202U NFCT DS 22 TRGT SARS-COV-2: CPT

## 2024-05-07 PROCEDURE — 82962 GLUCOSE BLOOD TEST: CPT

## 2024-05-07 PROCEDURE — 2580000003 HC RX 258: Performed by: INTERNAL MEDICINE

## 2024-05-07 RX ORDER — BUPROPION HYDROCHLORIDE 150 MG/1
150 TABLET, EXTENDED RELEASE ORAL DAILY
Status: DISCONTINUED | OUTPATIENT
Start: 2024-05-07 | End: 2024-05-12 | Stop reason: HOSPADM

## 2024-05-07 RX ORDER — SODIUM CHLORIDE 9 MG/ML
INJECTION, SOLUTION INTRAVENOUS CONTINUOUS
Status: DISCONTINUED | OUTPATIENT
Start: 2024-05-07 | End: 2024-05-08

## 2024-05-07 RX ORDER — ACETAMINOPHEN 650 MG/1
650 SUPPOSITORY RECTAL EVERY 6 HOURS PRN
Status: DISCONTINUED | OUTPATIENT
Start: 2024-05-07 | End: 2024-05-12 | Stop reason: HOSPADM

## 2024-05-07 RX ORDER — ENOXAPARIN SODIUM 100 MG/ML
40 INJECTION SUBCUTANEOUS DAILY
Status: DISCONTINUED | OUTPATIENT
Start: 2024-05-07 | End: 2024-05-12 | Stop reason: HOSPADM

## 2024-05-07 RX ORDER — ACETAMINOPHEN 325 MG/1
650 TABLET ORAL EVERY 6 HOURS PRN
Status: DISCONTINUED | OUTPATIENT
Start: 2024-05-07 | End: 2024-05-12 | Stop reason: HOSPADM

## 2024-05-07 RX ORDER — POTASSIUM CHLORIDE 750 MG/1
40 TABLET, FILM COATED, EXTENDED RELEASE ORAL PRN
Status: DISCONTINUED | OUTPATIENT
Start: 2024-05-07 | End: 2024-05-12 | Stop reason: HOSPADM

## 2024-05-07 RX ORDER — ASPIRIN 325 MG
325 TABLET ORAL DAILY
Status: DISCONTINUED | OUTPATIENT
Start: 2024-05-07 | End: 2024-05-12 | Stop reason: HOSPADM

## 2024-05-07 RX ORDER — ONDANSETRON 4 MG/1
4 TABLET, ORALLY DISINTEGRATING ORAL EVERY 8 HOURS PRN
Status: DISCONTINUED | OUTPATIENT
Start: 2024-05-07 | End: 2024-05-12 | Stop reason: HOSPADM

## 2024-05-07 RX ORDER — ATORVASTATIN CALCIUM 40 MG/1
40 TABLET, FILM COATED ORAL NIGHTLY
Status: DISCONTINUED | OUTPATIENT
Start: 2024-05-08 | End: 2024-05-12 | Stop reason: HOSPADM

## 2024-05-07 RX ORDER — SODIUM CHLORIDE 9 MG/ML
INJECTION, SOLUTION INTRAVENOUS PRN
Status: DISCONTINUED | OUTPATIENT
Start: 2024-05-07 | End: 2024-05-12 | Stop reason: HOSPADM

## 2024-05-07 RX ORDER — ONDANSETRON 2 MG/ML
4 INJECTION INTRAMUSCULAR; INTRAVENOUS EVERY 6 HOURS PRN
Status: DISCONTINUED | OUTPATIENT
Start: 2024-05-07 | End: 2024-05-12 | Stop reason: HOSPADM

## 2024-05-07 RX ORDER — SODIUM CHLORIDE 0.9 % (FLUSH) 0.9 %
5-40 SYRINGE (ML) INJECTION EVERY 12 HOURS SCHEDULED
Status: DISCONTINUED | OUTPATIENT
Start: 2024-05-07 | End: 2024-05-12 | Stop reason: HOSPADM

## 2024-05-07 RX ORDER — HYDROCHLOROTHIAZIDE 25 MG/1
12.5 TABLET ORAL DAILY
Status: DISCONTINUED | OUTPATIENT
Start: 2024-05-07 | End: 2024-05-11

## 2024-05-07 RX ORDER — MAGNESIUM SULFATE IN WATER 40 MG/ML
2000 INJECTION, SOLUTION INTRAVENOUS PRN
Status: DISCONTINUED | OUTPATIENT
Start: 2024-05-07 | End: 2024-05-12 | Stop reason: HOSPADM

## 2024-05-07 RX ORDER — FAMOTIDINE 20 MG/1
20 TABLET, FILM COATED ORAL DAILY
Status: DISCONTINUED | OUTPATIENT
Start: 2024-05-07 | End: 2024-05-12 | Stop reason: HOSPADM

## 2024-05-07 RX ORDER — ATORVASTATIN CALCIUM 40 MG/1
40 TABLET, FILM COATED ORAL DAILY
Status: DISCONTINUED | OUTPATIENT
Start: 2024-05-07 | End: 2024-05-07

## 2024-05-07 RX ORDER — FLUOXETINE HYDROCHLORIDE 20 MG/1
20 CAPSULE ORAL DAILY
Status: DISCONTINUED | OUTPATIENT
Start: 2024-05-07 | End: 2024-05-11

## 2024-05-07 RX ORDER — POTASSIUM CHLORIDE 7.45 MG/ML
10 INJECTION INTRAVENOUS PRN
Status: DISCONTINUED | OUTPATIENT
Start: 2024-05-07 | End: 2024-05-12 | Stop reason: HOSPADM

## 2024-05-07 RX ORDER — SODIUM CHLORIDE 0.9 % (FLUSH) 0.9 %
5-40 SYRINGE (ML) INJECTION PRN
Status: DISCONTINUED | OUTPATIENT
Start: 2024-05-07 | End: 2024-05-12 | Stop reason: HOSPADM

## 2024-05-07 RX ORDER — POLYETHYLENE GLYCOL 3350 17 G/17G
17 POWDER, FOR SOLUTION ORAL DAILY PRN
Status: DISCONTINUED | OUTPATIENT
Start: 2024-05-07 | End: 2024-05-12 | Stop reason: HOSPADM

## 2024-05-07 RX ADMIN — FAMOTIDINE 20 MG: 20 TABLET ORAL at 17:30

## 2024-05-07 RX ADMIN — ENOXAPARIN SODIUM 40 MG: 100 INJECTION SUBCUTANEOUS at 17:30

## 2024-05-07 RX ADMIN — SODIUM CHLORIDE: 9 INJECTION, SOLUTION INTRAVENOUS at 17:37

## 2024-05-07 RX ADMIN — ASPIRIN 325 MG: 325 TABLET ORAL at 17:30

## 2024-05-07 RX ADMIN — ACETAMINOPHEN 650 MG: 325 TABLET ORAL at 17:30

## 2024-05-07 RX ADMIN — POTASSIUM BICARBONATE 40 MEQ: 782 TABLET, EFFERVESCENT ORAL at 17:30

## 2024-05-07 RX ADMIN — BUPROPION HYDROCHLORIDE 150 MG: 150 TABLET, FILM COATED, EXTENDED RELEASE ORAL at 17:39

## 2024-05-07 ASSESSMENT — PAIN DESCRIPTION - ONSET: ONSET: GRADUAL

## 2024-05-07 ASSESSMENT — PAIN SCALES - GENERAL
PAINLEVEL_OUTOF10: 0
PAINLEVEL_OUTOF10: 0
PAINLEVEL_OUTOF10: 3

## 2024-05-07 ASSESSMENT — PAIN DESCRIPTION - LOCATION: LOCATION: HEAD

## 2024-05-07 ASSESSMENT — PAIN - FUNCTIONAL ASSESSMENT
PAIN_FUNCTIONAL_ASSESSMENT: 0-10
PAIN_FUNCTIONAL_ASSESSMENT: PREVENTS OR INTERFERES SOME ACTIVE ACTIVITIES AND ADLS

## 2024-05-07 ASSESSMENT — PAIN DESCRIPTION - PAIN TYPE: TYPE: ACUTE PAIN

## 2024-05-07 ASSESSMENT — PAIN DESCRIPTION - FREQUENCY: FREQUENCY: CONTINUOUS

## 2024-05-07 ASSESSMENT — PAIN DESCRIPTION - ORIENTATION: ORIENTATION: MID

## 2024-05-07 NOTE — ED NOTES
12:23 PM    Patient is a 74-year-old female with history of [place], orthostatic hypotension, atypical chest pain, mild cognitive impairment, depression, who presents emergency room with  for reports of a fall that happened yesterday at 5:30 AM.  Patient reports she is unsure why she fell, but did hit her head.  Patient did not have any loss of consciousness.  Patient is unable to ambulate on her own now and  reports that she has been disoriented.  Patient is not on any blood thinners.  EMS attempted to get her to come to the ER yesterday, but patient refused.      I have evaluated the patient as the Provider in Rapid Medical Evaluation (RME). I have reviewed her vital signs and the triage nurse assessment. I have talked with the patient and any available family and advised that I am the provider in triage and have ordered the appropriate study to initiate their work up based on the clinical presentation during my assessment. I have advised that the patient will be accommodated in the Main ED as soon as possible. I have also requested to contact the triage nurse or myself immediately if the patient experiences any changes in their condition during this brief waiting period.  MAUREEN Melton Reagan, PA-C  05/07/24 1243

## 2024-05-07 NOTE — ED NOTES
Called to waiting room by registration, family stated she was not responding, pt able to speak slowly and was able to answer some questions,  , VSS , pt brought back to room

## 2024-05-07 NOTE — H&P
based on care coordination needs.     Assessment:   Given the patient's current clinical presentation, there is a high level of concern for decompensation if discharged from the emergency department. Complex decision making was performed, which includes reviewing the patient's available past medical records, laboratory results, and imaging studies.    Principal Problem:    Hyponatremia  Resolved Problems:    * No resolved hospital problems. *      Plan:     Altered Mental status   Likely due to Hyponatremia   - admit to telemetry   - Na 122  - likely prerenal   - CT head: No evidence for acute intracranial abnormality    - MRI brain ordered   - started on IVF  - nephrology consult placed   - will monitor BMP q4h     Leucocytosis   - r/o infection source   - CXR: no clear   - UA pending   - US abd: Status post cholecystectomy. No biliary ductal dilatation.   - will monitor off abx     Hypokalemia   - replaced, will monitor     Hx TIA   - c/w home aspirin     HTN  - hold HCTZ    HLD- c/w statin     Depression  - c/w wellbutrin. Hold prozac     DIET: ADULT DIET; Regular   ISOLATION PRECAUTIONS: No active isolations  CODE STATUS: Full Code   Central Line:     DVT PROPHYLAXIS: Lovenox  FUNCTIONAL STATUS PRIOR TO HOSPITALIZATION: Fully active and ambulatory; able to carry on all self-care without restriction.  Ambulatory status/function: By self   EARLY MOBILITY ASSESSMENT: Recommend an assessment from physical therapy and/or occupational therapy  ANTICIPATED DISCHARGE: Greater than 48 hours.  ANTICIPATED DISPOSITION: Home with Home Healthcare  EMERGENCY CONTACT/SURROGATE DECISION MAKER:  Albert     CRITICAL CARE WAS PERFORMED FOR THIS ENCOUNTER: NO.      Signed By: Sushma Madala, MD     May 7, 2024

## 2024-05-07 NOTE — ED NOTES
ED TO INPATIENT SBAR HANDOFF    Patient Name: Yanet Posada   :  1950  74 y.o.   MRN:  [id number]  ED Room #:  ER07/07  Family/Caregiver Present yes       Situation  Code Status: Full Code     Allergies: Bee venom and Diclofenac sodium  Weight: No data found.  Arrived from: home  Chief Complaint:   Chief Complaint   Patient presents with    Fall     Hospital Problem/Diagnosis:  Principal Problem:    Hyponatremia  Resolved Problems:    * No resolved hospital problems. *    Imaging:   US ABDOMEN LIMITED   Final Result   Status post cholecystectomy. No biliary ductal dilatation.      XR HIP 2-3 VW W PELVIS LEFT   Final Result   No acute abnormality.      XR CHEST (2 VW)   Final Result      No acute abnormality.         CT Head W/O Contrast   Final Result      No evidence for acute intracranial abnormality               MRI BRAIN WO CONTRAST    (Results Pending)     Abnormal labs:   Abnormal Labs Reviewed   CBC WITH AUTO DIFFERENTIAL - Abnormal; Notable for the following components:       Result Value    WBC 16.2 (*)     Neutrophils % 83 (*)     Lymphocytes % 6 (*)     Immature Granulocytes % 1 (*)     Neutrophils Absolute 13.5 (*)     Monocytes Absolute 1.6 (*)     Immature Granulocytes Absolute 0.1 (*)     All other components within normal limits   COMPREHENSIVE METABOLIC PANEL - Abnormal; Notable for the following components:    Sodium 122 (*)     Potassium 3.0 (*)     Chloride 86 (*)     Glucose 113 (*)     Creatinine 1.24 (*)     Est, Glom Filt Rate 46 (*)     Total Bilirubin 1.6 (*)      (*)      (*)     Albumin 3.2 (*)     Globulin 4.5 (*)     Albumin/Globulin Ratio 0.7 (*)     All other components within normal limits   POCT GLUCOSE - Abnormal; Notable for the following components:    POC Glucose 142 (*)     All other components within normal limits     Abnormal Assessment Findings: left hip pain, Respirations 30's, High Temp    SAFETY    Mobility: a recent fall   ED Fall Risk: Presents to

## 2024-05-07 NOTE — ED NOTES
Per  patient fell yesterday morning, had unsteady gait, was slightly disoriented and had difficulty with her speech when he saw her after the fall. LKW before bed Larry night

## 2024-05-07 NOTE — ED TRIAGE NOTES
Patient fell yesterday morning around 0530 when she got up to use the restroom. Per , EMS attempted to get her to come to the ER, she refused.  Patient arrives today because she is unable to ambulate on her own, she is a little disoriented.  She thinks she hit her head, does not take any blood thinners.       with patient.

## 2024-05-08 ENCOUNTER — APPOINTMENT (OUTPATIENT)
Facility: HOSPITAL | Age: 74
DRG: 871 | End: 2024-05-08
Payer: MEDICARE

## 2024-05-08 LAB
ALBUMIN SERPL-MCNC: 2.6 G/DL (ref 3.5–5)
ANION GAP SERPL CALC-SCNC: 7 MMOL/L (ref 5–15)
ANION GAP SERPL CALC-SCNC: 8 MMOL/L (ref 5–15)
ANION GAP SERPL CALC-SCNC: 8 MMOL/L (ref 5–15)
ANION GAP SERPL CALC-SCNC: 9 MMOL/L (ref 5–15)
APPEARANCE UR: CLEAR
BACTERIA URNS QL MICRO: ABNORMAL /HPF
BASOPHILS # BLD: 0 K/UL (ref 0–0.1)
BASOPHILS NFR BLD: 0 % (ref 0–1)
BILIRUB UR QL: NEGATIVE
BUN SERPL-MCNC: 14 MG/DL (ref 6–20)
BUN SERPL-MCNC: 16 MG/DL (ref 6–20)
BUN SERPL-MCNC: 16 MG/DL (ref 6–20)
BUN SERPL-MCNC: 17 MG/DL (ref 6–20)
BUN/CREAT SERPL: 12 (ref 12–20)
BUN/CREAT SERPL: 13 (ref 12–20)
BUN/CREAT SERPL: 13 (ref 12–20)
BUN/CREAT SERPL: 14 (ref 12–20)
CALCIUM SERPL-MCNC: 8.1 MG/DL (ref 8.5–10.1)
CALCIUM SERPL-MCNC: 8.4 MG/DL (ref 8.5–10.1)
CALCIUM SERPL-MCNC: 8.4 MG/DL (ref 8.5–10.1)
CALCIUM SERPL-MCNC: 8.5 MG/DL (ref 8.5–10.1)
CHLORIDE SERPL-SCNC: 93 MMOL/L (ref 97–108)
CHLORIDE SERPL-SCNC: 93 MMOL/L (ref 97–108)
CHLORIDE SERPL-SCNC: 94 MMOL/L (ref 97–108)
CHLORIDE SERPL-SCNC: 95 MMOL/L (ref 97–108)
CO2 SERPL-SCNC: 24 MMOL/L (ref 21–32)
CO2 SERPL-SCNC: 25 MMOL/L (ref 21–32)
COLOR UR: ABNORMAL
CREAT SERPL-MCNC: 1.07 MG/DL (ref 0.55–1.02)
CREAT SERPL-MCNC: 1.22 MG/DL (ref 0.55–1.02)
CREAT SERPL-MCNC: 1.27 MG/DL (ref 0.55–1.02)
CREAT SERPL-MCNC: 1.3 MG/DL (ref 0.55–1.02)
DIFFERENTIAL METHOD BLD: ABNORMAL
EOSINOPHIL # BLD: 0 K/UL (ref 0–0.4)
EOSINOPHIL NFR BLD: 0 % (ref 0–7)
EPITH CASTS URNS QL MICRO: ABNORMAL /LPF
ERYTHROCYTE [DISTWIDTH] IN BLOOD BY AUTOMATED COUNT: 12.4 % (ref 11.5–14.5)
GLUCOSE SERPL-MCNC: 116 MG/DL (ref 65–100)
GLUCOSE SERPL-MCNC: 119 MG/DL (ref 65–100)
GLUCOSE SERPL-MCNC: 122 MG/DL (ref 65–100)
GLUCOSE SERPL-MCNC: 192 MG/DL (ref 65–100)
GLUCOSE UR STRIP.AUTO-MCNC: NEGATIVE MG/DL
HCT VFR BLD AUTO: 39.7 % (ref 35–47)
HGB BLD-MCNC: 14.3 G/DL (ref 11.5–16)
HGB UR QL STRIP: ABNORMAL
IMM GRANULOCYTES # BLD AUTO: 0.2 K/UL (ref 0–0.04)
IMM GRANULOCYTES NFR BLD AUTO: 1 % (ref 0–0.5)
KETONES UR QL STRIP.AUTO: NEGATIVE MG/DL
LEUKOCYTE ESTERASE UR QL STRIP.AUTO: NEGATIVE
LYMPHOCYTES # BLD: 0.8 K/UL (ref 0.8–3.5)
LYMPHOCYTES NFR BLD: 5 % (ref 12–49)
MAGNESIUM SERPL-MCNC: 2.2 MG/DL (ref 1.6–2.4)
MCH RBC QN AUTO: 30.4 PG (ref 26–34)
MCHC RBC AUTO-ENTMCNC: 36 G/DL (ref 30–36.5)
MCV RBC AUTO: 84.5 FL (ref 80–99)
MONOCYTES # BLD: 1.4 K/UL (ref 0–1)
MONOCYTES NFR BLD: 9 % (ref 5–13)
NEUTS SEG # BLD: 13.2 K/UL (ref 1.8–8)
NEUTS SEG NFR BLD: 85 % (ref 32–75)
NITRITE UR QL STRIP.AUTO: NEGATIVE
NRBC # BLD: 0 K/UL (ref 0–0.01)
NRBC BLD-RTO: 0 PER 100 WBC
PH UR STRIP: 6 (ref 5–8)
PHOSPHATE SERPL-MCNC: 1.3 MG/DL (ref 2.6–4.7)
PLATELET # BLD AUTO: 173 K/UL (ref 150–400)
PMV BLD AUTO: 9.9 FL (ref 8.9–12.9)
POTASSIUM SERPL-SCNC: 3 MMOL/L (ref 3.5–5.1)
POTASSIUM SERPL-SCNC: 3.2 MMOL/L (ref 3.5–5.1)
POTASSIUM SERPL-SCNC: 3.6 MMOL/L (ref 3.5–5.1)
POTASSIUM SERPL-SCNC: 3.9 MMOL/L (ref 3.5–5.1)
PROT UR STRIP-MCNC: 100 MG/DL
RBC # BLD AUTO: 4.7 M/UL (ref 3.8–5.2)
RBC #/AREA URNS HPF: ABNORMAL /HPF (ref 0–5)
RBC MORPH BLD: ABNORMAL
SODIUM SERPL-SCNC: 125 MMOL/L (ref 136–145)
SODIUM SERPL-SCNC: 126 MMOL/L (ref 136–145)
SODIUM SERPL-SCNC: 127 MMOL/L (ref 136–145)
SODIUM SERPL-SCNC: 128 MMOL/L (ref 136–145)
SP GR UR REFRACTOMETRY: 1.02 (ref 1–1.03)
URINE CULTURE IF INDICATED: ABNORMAL
UROBILINOGEN UR QL STRIP.AUTO: 1 EU/DL (ref 0.2–1)
WBC # BLD AUTO: 15.6 K/UL (ref 3.6–11)
WBC URNS QL MICRO: ABNORMAL /HPF (ref 0–4)

## 2024-05-08 PROCEDURE — 97161 PT EVAL LOW COMPLEX 20 MIN: CPT

## 2024-05-08 PROCEDURE — 97116 GAIT TRAINING THERAPY: CPT

## 2024-05-08 PROCEDURE — 85025 COMPLETE CBC W/AUTO DIFF WBC: CPT

## 2024-05-08 PROCEDURE — 97165 OT EVAL LOW COMPLEX 30 MIN: CPT

## 2024-05-08 PROCEDURE — 80048 BASIC METABOLIC PNL TOTAL CA: CPT

## 2024-05-08 PROCEDURE — 6370000000 HC RX 637 (ALT 250 FOR IP): Performed by: INTERNAL MEDICINE

## 2024-05-08 PROCEDURE — 2580000003 HC RX 258: Performed by: INTERNAL MEDICINE

## 2024-05-08 PROCEDURE — 6360000002 HC RX W HCPCS: Performed by: INTERNAL MEDICINE

## 2024-05-08 PROCEDURE — 81001 URINALYSIS AUTO W/SCOPE: CPT

## 2024-05-08 PROCEDURE — 70551 MRI BRAIN STEM W/O DYE: CPT

## 2024-05-08 PROCEDURE — 80069 RENAL FUNCTION PANEL: CPT

## 2024-05-08 PROCEDURE — 36415 COLL VENOUS BLD VENIPUNCTURE: CPT

## 2024-05-08 PROCEDURE — 97535 SELF CARE MNGMENT TRAINING: CPT

## 2024-05-08 PROCEDURE — 2060000000 HC ICU INTERMEDIATE R&B

## 2024-05-08 PROCEDURE — 83735 ASSAY OF MAGNESIUM: CPT

## 2024-05-08 PROCEDURE — 51798 US URINE CAPACITY MEASURE: CPT

## 2024-05-08 PROCEDURE — 97530 THERAPEUTIC ACTIVITIES: CPT

## 2024-05-08 RX ORDER — POTASSIUM CHLORIDE 750 MG/1
40 TABLET, FILM COATED, EXTENDED RELEASE ORAL ONCE
Status: COMPLETED | OUTPATIENT
Start: 2024-05-08 | End: 2024-05-08

## 2024-05-08 RX ADMIN — POTASSIUM CHLORIDE 40 MEQ: 750 TABLET, FILM COATED, EXTENDED RELEASE ORAL at 13:43

## 2024-05-08 RX ADMIN — ACETAMINOPHEN 650 MG: 325 TABLET ORAL at 21:46

## 2024-05-08 RX ADMIN — ATORVASTATIN CALCIUM 40 MG: 40 TABLET, FILM COATED ORAL at 21:46

## 2024-05-08 RX ADMIN — BUPROPION HYDROCHLORIDE 150 MG: 150 TABLET, FILM COATED, EXTENDED RELEASE ORAL at 08:45

## 2024-05-08 RX ADMIN — FAMOTIDINE 20 MG: 20 TABLET ORAL at 08:45

## 2024-05-08 RX ADMIN — SODIUM CHLORIDE, PRESERVATIVE FREE 10 ML: 5 INJECTION INTRAVENOUS at 08:47

## 2024-05-08 RX ADMIN — ENOXAPARIN SODIUM 40 MG: 100 INJECTION SUBCUTANEOUS at 08:45

## 2024-05-08 RX ADMIN — ACETAMINOPHEN 650 MG: 325 TABLET ORAL at 15:53

## 2024-05-08 RX ADMIN — WATER 1000 MG: 1 INJECTION INTRAMUSCULAR; INTRAVENOUS; SUBCUTANEOUS at 17:33

## 2024-05-08 RX ADMIN — POTASSIUM CHLORIDE 40 MEQ: 750 TABLET, FILM COATED, EXTENDED RELEASE ORAL at 15:47

## 2024-05-08 RX ADMIN — ACETAMINOPHEN 650 MG: 325 TABLET ORAL at 08:45

## 2024-05-08 RX ADMIN — SODIUM CHLORIDE: 9 INJECTION, SOLUTION INTRAVENOUS at 10:43

## 2024-05-08 RX ADMIN — SODIUM CHLORIDE, PRESERVATIVE FREE 10 ML: 5 INJECTION INTRAVENOUS at 21:50

## 2024-05-08 RX ADMIN — ASPIRIN 325 MG: 325 TABLET ORAL at 08:45

## 2024-05-08 ASSESSMENT — PAIN SCALES - GENERAL
PAINLEVEL_OUTOF10: 0
PAINLEVEL_OUTOF10: 3
PAINLEVEL_OUTOF10: 0
PAINLEVEL_OUTOF10: 0

## 2024-05-08 ASSESSMENT — PAIN - FUNCTIONAL ASSESSMENT: PAIN_FUNCTIONAL_ASSESSMENT: ACTIVITIES ARE NOT PREVENTED

## 2024-05-08 ASSESSMENT — PAIN DESCRIPTION - DESCRIPTORS: DESCRIPTORS: ACHING

## 2024-05-08 ASSESSMENT — PAIN DESCRIPTION - FREQUENCY: FREQUENCY: INTERMITTENT

## 2024-05-08 ASSESSMENT — PAIN DESCRIPTION - ONSET: ONSET: GRADUAL

## 2024-05-08 ASSESSMENT — PAIN DESCRIPTION - ORIENTATION: ORIENTATION: MID

## 2024-05-08 ASSESSMENT — PAIN DESCRIPTION - LOCATION: LOCATION: BACK

## 2024-05-08 ASSESSMENT — PAIN DESCRIPTION - PAIN TYPE: TYPE: ACUTE PAIN

## 2024-05-08 NOTE — CARE COORDINATION
Care Management Initial Assessment       RUR:  11% Low risk  Readmission? No  1st IM letter given? Yes - 5/8/2024  1st  letter given: No       05/08/24 4178   Service Assessment   Patient Orientation Alert and Oriented   Cognition Alert   History Provided By Patient   Primary Caregiver Self   Support Systems Spouse/Significant Other;Children   Patient's Healthcare Decision Maker is: Named in Scanned ACP Document   PCP Verified by CM Yes   Last Visit to PCP Within last 6 months   Prior Functional Level Independent in ADLs/IADLs   Current Functional Level Independent in ADLs/IADLs   Can patient return to prior living arrangement Yes   Ability to make needs known: Good   Family able to assist with home care needs: Yes   Would you like for me to discuss the discharge plan with any other family members/significant others, and if so, who? Yes   Financial Resources Medicare   Social/Functional History   Lives With Spouse   Home Layout Two level;Bed/Bath upstairs   Home Access Stairs to enter with rails   Entrance Stairs - Number of Steps 9   Entrance Stairs - Rails Both   Bathroom Shower/Tub Walk-in shower   Bathroom Toilet Standard   Bathroom Equipment Built-in shower seat   Bathroom Accessibility Walker accessible   Home Equipment None   Receives Help From Family   ADL Assistance Independent   Homemaking Assistance Independent   Homemaking Responsibilities Yes   Meal Prep Responsibility Secondary   Laundry Responsibility Secondary   Cleaning Responsibility Secondary   Bill Paying/Finance Responsibility Secondary   Shopping Responsibility Secondary   Health Care Management Secondary   Ambulation Assistance Independent   Transfer Assistance Independent   Active  No   Patient's  Info Spouse   Mode of Transportation Van   Education 3 Years College, Practical Nursing Degree   Occupation Retired   Discharge Planning   Type of Residence House   Living Arrangements Spouse/Significant Other   Current Services

## 2024-05-08 NOTE — CONSULTS
NEPHROLOGY CONSULT NOTE     Patient: Yanet Posada MRN: [id number]  PCP: Tani Weiss MD   :     1950  Age:   74 y.o.  Sex:  female      Referring physician: Madala, Sushma, MD  Reason for consultation: 74 y.o. female with Hyponatremia [E87.1]  Altered mental status, unspecified altered mental status type [R41.82] complicated by SCOTT   Admission Date: 2024  1:50 PM  LOS: 1 day      ASSESSMENT and PLAN :   Hyponatremia:  - from SAIDH from SSRI + thaizide  - stop IVF  - FR 1.2  L/d  - check urine osms, Na, TSH and AM cortisol tomorrow  - hold thiazide and SSRI  - repeat labs now then around 8pm  - call nephrology with results    Hypokalemia:  - replete PRN    Leukocytosis:  - on rocephin  - cx negative, CXR neg    Hx of TIA  HTN  Depression  HLD     Active Problems / Assessment AAActive  :   Principal Problem:    Hyponatremia  Resolved Problems:    * No resolved hospital problems. *       Subjective:   HPI: Yanet Posada is a 74 y.o.  female who has been admitted to the hospital for AMS and fall at home.  She has a hx of HTN on thiazide, depression on SSRI at home, HLD.  Fond to have a Na of 122.  Neg head CT.  She was started on IVF, Na was improving nicely to 127, down to 125 today.   Per , she is a little more confused this afternoon.  Repeat BMP pending.  She denies any cp, sob, n/v/d.     Past Medical Hx:   Past Medical History:   Diagnosis Date    Allergic rhinitis due to other allergen     Anxiety     Arthritis     Asthma     in younger years - no inhaler use    AVM (arteriovenous malformation) brain 2012    MRI 2013; stable ischemic changes; hx of CVA    Chronic pain     Depression 2009    Esophageal reflux     GERD (gastroesophageal reflux disease)     HTN, goal below 140/90     Hyperlipidemia 2009    IBS (irritable bowel syndrome)     Insomnia     Liver disease     FATTY LIVER    Obesity, unspecified     Osteopenia     started Fosamax therapy 2015

## 2024-05-08 NOTE — ED NOTES
ED TO INPATIENT SBAR HANDOFF    Patient Name: Yanet Posada   :  1950  74 y.o.   MRN:  [id number]  ED Room #:  ER07/07  Family/Caregiver Present yes       Situation  Code Status: Full Code     Allergies: Bee venom and Diclofenac sodium  Weight: No data found.  Arrived from: home  Chief Complaint:   Chief Complaint   Patient presents with    [facility]     Hospital Problem/Diagnosis:  Principal Problem:    Hyponatremia  Resolved Problems:    * No resolved hospital problems. *    Imaging:   US ABDOMEN LIMITED   Final Result   Status post cholecystectomy. No biliary ductal dilatation.      XR HIP 2-3 VW W PELVIS LEFT   Final Result   No acute abnormality.      XR CHEST (2 VW)   Final Result      No acute abnormality.         CT Head W/O Contrast   Final Result      No evidence for acute intracranial abnormality               MRI BRAIN WO CONTRAST    (Results Pending)     Abnormal labs:   Abnormal Labs Reviewed   CBC WITH AUTO DIFFERENTIAL - Abnormal; Notable for the following components:       Result Value    WBC 16.2 (*)     Neutrophils % 83 (*)     Lymphocytes % 6 (*)     Immature Granulocytes % 1 (*)     Neutrophils Absolute 13.5 (*)     Monocytes Absolute 1.6 (*)     Immature Granulocytes Absolute 0.1 (*)     All other components within normal limits   COMPREHENSIVE METABOLIC PANEL - Abnormal; Notable for the following components:    Sodium 122 (*)     Potassium 3.0 (*)     Chloride 86 (*)     Glucose 113 (*)     Creatinine 1.24 (*)     Est, Glom Filt Rate 46 (*)     Total Bilirubin 1.6 (*)      (*)      (*)     Albumin 3.2 (*)     Globulin 4.5 (*)     Albumin/Globulin Ratio 0.7 (*)     All other components within normal limits   BASIC METABOLIC PANEL - Abnormal; Notable for the following components:    Sodium 124 (*)     Potassium 3.0 (*)     Chloride 90 (*)     Glucose 113 (*)     Est, Glom Filt Rate 58 (*)     All other components within normal limits   POCT GLUCOSE - Abnormal; Notable for the

## 2024-05-09 ENCOUNTER — APPOINTMENT (OUTPATIENT)
Facility: HOSPITAL | Age: 74
DRG: 871 | End: 2024-05-09
Payer: MEDICARE

## 2024-05-09 LAB
ANION GAP SERPL CALC-SCNC: 7 MMOL/L (ref 5–15)
BASOPHILS # BLD: 0 K/UL (ref 0–0.1)
BASOPHILS NFR BLD: 0 % (ref 0–1)
BUN SERPL-MCNC: 14 MG/DL (ref 6–20)
BUN/CREAT SERPL: 15 (ref 12–20)
CALCIUM SERPL-MCNC: 8.2 MG/DL (ref 8.5–10.1)
CHLORIDE SERPL-SCNC: 99 MMOL/L (ref 97–108)
CO2 SERPL-SCNC: 23 MMOL/L (ref 21–32)
CORTIS AM PEAK SERPL-MCNC: 35.9 UG/DL (ref 4.3–22.45)
CREAT SERPL-MCNC: 0.96 MG/DL (ref 0.55–1.02)
DIFFERENTIAL METHOD BLD: ABNORMAL
EOSINOPHIL # BLD: 0 K/UL (ref 0–0.4)
EOSINOPHIL NFR BLD: 0 % (ref 0–7)
ERYTHROCYTE [DISTWIDTH] IN BLOOD BY AUTOMATED COUNT: 12.3 % (ref 11.5–14.5)
GLUCOSE SERPL-MCNC: 119 MG/DL (ref 65–100)
HCT VFR BLD AUTO: 34.7 % (ref 35–47)
HGB BLD-MCNC: 12 G/DL (ref 11.5–16)
IMM GRANULOCYTES # BLD AUTO: 0.1 K/UL (ref 0–0.04)
IMM GRANULOCYTES NFR BLD AUTO: 1 % (ref 0–0.5)
LYMPHOCYTES # BLD: 0.4 K/UL (ref 0.8–3.5)
LYMPHOCYTES NFR BLD: 4 % (ref 12–49)
MCH RBC QN AUTO: 29.4 PG (ref 26–34)
MCHC RBC AUTO-ENTMCNC: 34.6 G/DL (ref 30–36.5)
MCV RBC AUTO: 85 FL (ref 80–99)
MONOCYTES # BLD: 0.8 K/UL (ref 0–1)
MONOCYTES NFR BLD: 8 % (ref 5–13)
NEUTS SEG # BLD: 8.9 K/UL (ref 1.8–8)
NEUTS SEG NFR BLD: 87 % (ref 32–75)
NRBC # BLD: 0 K/UL (ref 0–0.01)
NRBC BLD-RTO: 0 PER 100 WBC
PLATELET # BLD AUTO: 163 K/UL (ref 150–400)
PMV BLD AUTO: 11 FL (ref 8.9–12.9)
POTASSIUM SERPL-SCNC: 3.9 MMOL/L (ref 3.5–5.1)
RBC # BLD AUTO: 4.08 M/UL (ref 3.8–5.2)
RBC MORPH BLD: ABNORMAL
SODIUM SERPL-SCNC: 129 MMOL/L (ref 136–145)
TSH SERPL DL<=0.05 MIU/L-ACNC: 0.88 UIU/ML (ref 0.36–3.74)
WBC # BLD AUTO: 10.2 K/UL (ref 3.6–11)

## 2024-05-09 PROCEDURE — 51798 US URINE CAPACITY MEASURE: CPT

## 2024-05-09 PROCEDURE — 6360000002 HC RX W HCPCS: Performed by: INTERNAL MEDICINE

## 2024-05-09 PROCEDURE — 85025 COMPLETE CBC W/AUTO DIFF WBC: CPT

## 2024-05-09 PROCEDURE — 2060000000 HC ICU INTERMEDIATE R&B

## 2024-05-09 PROCEDURE — 97116 GAIT TRAINING THERAPY: CPT

## 2024-05-09 PROCEDURE — 82533 TOTAL CORTISOL: CPT

## 2024-05-09 PROCEDURE — 36415 COLL VENOUS BLD VENIPUNCTURE: CPT

## 2024-05-09 PROCEDURE — 84443 ASSAY THYROID STIM HORMONE: CPT

## 2024-05-09 PROCEDURE — 97535 SELF CARE MNGMENT TRAINING: CPT

## 2024-05-09 PROCEDURE — 51701 INSERT BLADDER CATHETER: CPT

## 2024-05-09 PROCEDURE — 6370000000 HC RX 637 (ALT 250 FOR IP): Performed by: INTERNAL MEDICINE

## 2024-05-09 PROCEDURE — 71260 CT THORAX DX C+: CPT

## 2024-05-09 PROCEDURE — 97530 THERAPEUTIC ACTIVITIES: CPT

## 2024-05-09 PROCEDURE — 2580000003 HC RX 258: Performed by: INTERNAL MEDICINE

## 2024-05-09 PROCEDURE — 80048 BASIC METABOLIC PNL TOTAL CA: CPT

## 2024-05-09 PROCEDURE — 6360000004 HC RX CONTRAST MEDICATION: Performed by: HOSPITALIST

## 2024-05-09 RX ADMIN — ACETAMINOPHEN 650 MG: 325 TABLET ORAL at 06:49

## 2024-05-09 RX ADMIN — ASPIRIN 325 MG: 325 TABLET ORAL at 08:18

## 2024-05-09 RX ADMIN — ENOXAPARIN SODIUM 40 MG: 100 INJECTION SUBCUTANEOUS at 08:18

## 2024-05-09 RX ADMIN — ATORVASTATIN CALCIUM 40 MG: 40 TABLET, FILM COATED ORAL at 20:17

## 2024-05-09 RX ADMIN — ACETAMINOPHEN 650 MG: 325 TABLET ORAL at 16:05

## 2024-05-09 RX ADMIN — FAMOTIDINE 20 MG: 20 TABLET ORAL at 08:18

## 2024-05-09 RX ADMIN — WATER 1000 MG: 1 INJECTION INTRAMUSCULAR; INTRAVENOUS; SUBCUTANEOUS at 15:57

## 2024-05-09 RX ADMIN — BUPROPION HYDROCHLORIDE 150 MG: 150 TABLET, FILM COATED, EXTENDED RELEASE ORAL at 08:18

## 2024-05-09 RX ADMIN — ACETAMINOPHEN 650 MG: 325 TABLET ORAL at 23:27

## 2024-05-09 RX ADMIN — IOPAMIDOL 100 ML: 755 INJECTION, SOLUTION INTRAVENOUS at 19:07

## 2024-05-09 RX ADMIN — SODIUM CHLORIDE, PRESERVATIVE FREE 10 ML: 5 INJECTION INTRAVENOUS at 20:18

## 2024-05-09 RX ADMIN — SODIUM CHLORIDE, PRESERVATIVE FREE 10 ML: 5 INJECTION INTRAVENOUS at 08:18

## 2024-05-10 ENCOUNTER — HOME HEALTH ADMISSION (OUTPATIENT)
Dept: HOME HEALTH SERVICES | Facility: HOME HEALTH | Age: 74
End: 2024-05-10
Payer: MEDICARE

## 2024-05-10 LAB
BASOPHILS # BLD: 0 K/UL (ref 0–0.1)
BASOPHILS NFR BLD: 0 % (ref 0–1)
COMMENT:: NORMAL
DIFFERENTIAL METHOD BLD: ABNORMAL
EOSINOPHIL # BLD: 0 K/UL (ref 0–0.4)
EOSINOPHIL NFR BLD: 0 % (ref 0–7)
ERYTHROCYTE [DISTWIDTH] IN BLOOD BY AUTOMATED COUNT: 12.7 % (ref 11.5–14.5)
HCT VFR BLD AUTO: 33.6 % (ref 35–47)
HGB BLD-MCNC: 12 G/DL (ref 11.5–16)
IMM GRANULOCYTES # BLD AUTO: 0.1 K/UL (ref 0–0.04)
IMM GRANULOCYTES NFR BLD AUTO: 1 % (ref 0–0.5)
LYMPHOCYTES # BLD: 0.7 K/UL (ref 0.8–3.5)
LYMPHOCYTES NFR BLD: 6 % (ref 12–49)
MCH RBC QN AUTO: 30.2 PG (ref 26–34)
MCHC RBC AUTO-ENTMCNC: 35.7 G/DL (ref 30–36.5)
MCV RBC AUTO: 84.4 FL (ref 80–99)
MONOCYTES # BLD: 1 K/UL (ref 0–1)
MONOCYTES NFR BLD: 9 % (ref 5–13)
NEUTS SEG # BLD: 9.1 K/UL (ref 1.8–8)
NEUTS SEG NFR BLD: 84 % (ref 32–75)
NRBC # BLD: 0 K/UL (ref 0–0.01)
NRBC BLD-RTO: 0 PER 100 WBC
PLATELET # BLD AUTO: 186 K/UL (ref 150–400)
PMV BLD AUTO: 10.6 FL (ref 8.9–12.9)
RBC # BLD AUTO: 3.98 M/UL (ref 3.8–5.2)
RBC MORPH BLD: ABNORMAL
SPECIMEN HOLD: NORMAL
WBC # BLD AUTO: 10.9 K/UL (ref 3.6–11)

## 2024-05-10 PROCEDURE — 51798 US URINE CAPACITY MEASURE: CPT

## 2024-05-10 PROCEDURE — 2580000003 HC RX 258: Performed by: HOSPITALIST

## 2024-05-10 PROCEDURE — 36415 COLL VENOUS BLD VENIPUNCTURE: CPT

## 2024-05-10 PROCEDURE — 1100000000 HC RM PRIVATE

## 2024-05-10 PROCEDURE — 97116 GAIT TRAINING THERAPY: CPT

## 2024-05-10 PROCEDURE — 6360000002 HC RX W HCPCS: Performed by: INTERNAL MEDICINE

## 2024-05-10 PROCEDURE — 85025 COMPLETE CBC W/AUTO DIFF WBC: CPT

## 2024-05-10 PROCEDURE — 97535 SELF CARE MNGMENT TRAINING: CPT

## 2024-05-10 PROCEDURE — 2580000003 HC RX 258: Performed by: INTERNAL MEDICINE

## 2024-05-10 PROCEDURE — 6370000000 HC RX 637 (ALT 250 FOR IP): Performed by: INTERNAL MEDICINE

## 2024-05-10 PROCEDURE — 6360000002 HC RX W HCPCS: Performed by: HOSPITALIST

## 2024-05-10 RX ADMIN — SODIUM CHLORIDE, PRESERVATIVE FREE 10 ML: 5 INJECTION INTRAVENOUS at 20:04

## 2024-05-10 RX ADMIN — FAMOTIDINE 20 MG: 20 TABLET ORAL at 08:15

## 2024-05-10 RX ADMIN — ACETAMINOPHEN 650 MG: 325 TABLET ORAL at 15:44

## 2024-05-10 RX ADMIN — SODIUM CHLORIDE, PRESERVATIVE FREE 10 ML: 5 INJECTION INTRAVENOUS at 08:16

## 2024-05-10 RX ADMIN — ASPIRIN 325 MG: 325 TABLET ORAL at 08:15

## 2024-05-10 RX ADMIN — ENOXAPARIN SODIUM 40 MG: 100 INJECTION SUBCUTANEOUS at 08:15

## 2024-05-10 RX ADMIN — ATORVASTATIN CALCIUM 40 MG: 40 TABLET, FILM COATED ORAL at 20:04

## 2024-05-10 RX ADMIN — ACETAMINOPHEN 650 MG: 325 TABLET ORAL at 22:19

## 2024-05-10 RX ADMIN — ACETAMINOPHEN 650 MG: 325 TABLET ORAL at 06:50

## 2024-05-10 RX ADMIN — WATER 1000 MG: 1 INJECTION INTRAMUSCULAR; INTRAVENOUS; SUBCUTANEOUS at 17:27

## 2024-05-10 RX ADMIN — AZITHROMYCIN MONOHYDRATE 500 MG: 500 INJECTION, POWDER, LYOPHILIZED, FOR SOLUTION INTRAVENOUS at 08:22

## 2024-05-10 RX ADMIN — BUPROPION HYDROCHLORIDE 150 MG: 150 TABLET, FILM COATED, EXTENDED RELEASE ORAL at 08:15

## 2024-05-10 ASSESSMENT — PAIN SCALES - GENERAL
PAINLEVEL_OUTOF10: 0

## 2024-05-10 NOTE — CARE COORDINATION
RUR:  11% Low risk  Readmission? No  1st IM letter given? Yes - 5/8/2024  1st  letter given: No  Mrs. Posada and her spouse were seen in their room.  Their address, phone number, and insurance were verified.  They live in a 2 story, single family residence with 9 steps to enter.  She does not have any durable medical equipment.  She was willing to have home health services if needed.  Their pharmacy is the [address]  They were offered choice for the rolling walker company.  They agreed with using [email or web address].  [employer] will provide transportation when Mrs. Posada is medically stable for discharge.    Update-5/10/24  Plan is to discharge Saturday pending medical stability. Referral for home health sent to [facility] via [facility]. Awaiting their response.   Walker has been delivered to patient room.  will provide transport at discharge.

## 2024-05-11 LAB
ALBUMIN SERPL-MCNC: 2.2 G/DL (ref 3.5–5)
ANION GAP SERPL CALC-SCNC: 9 MMOL/L (ref 5–15)
BASOPHILS # BLD: 0 K/UL (ref 0–0.1)
BASOPHILS NFR BLD: 0 % (ref 0–1)
BUN SERPL-MCNC: 14 MG/DL (ref 6–20)
BUN/CREAT SERPL: 15 (ref 12–20)
CALCIUM SERPL-MCNC: 8.4 MG/DL (ref 8.5–10.1)
CHLORIDE SERPL-SCNC: 98 MMOL/L (ref 97–108)
CO2 SERPL-SCNC: 21 MMOL/L (ref 21–32)
CREAT SERPL-MCNC: 0.94 MG/DL (ref 0.55–1.02)
DIFFERENTIAL METHOD BLD: ABNORMAL
EOSINOPHIL # BLD: 0 K/UL (ref 0–0.4)
EOSINOPHIL NFR BLD: 0 % (ref 0–7)
ERYTHROCYTE [DISTWIDTH] IN BLOOD BY AUTOMATED COUNT: 13.1 % (ref 11.5–14.5)
GLUCOSE SERPL-MCNC: 115 MG/DL (ref 65–100)
HCT VFR BLD AUTO: 32.2 % (ref 35–47)
HGB BLD-MCNC: 11.4 G/DL (ref 11.5–16)
IMM GRANULOCYTES # BLD AUTO: 0.1 K/UL (ref 0–0.04)
IMM GRANULOCYTES NFR BLD AUTO: 1 % (ref 0–0.5)
LYMPHOCYTES # BLD: 0.7 K/UL (ref 0.8–3.5)
LYMPHOCYTES NFR BLD: 6 % (ref 12–49)
MCH RBC QN AUTO: 29.4 PG (ref 26–34)
MCHC RBC AUTO-ENTMCNC: 35.4 G/DL (ref 30–36.5)
MCV RBC AUTO: 83 FL (ref 80–99)
MONOCYTES # BLD: 1 K/UL (ref 0–1)
MONOCYTES NFR BLD: 9 % (ref 5–13)
NEUTS SEG # BLD: 9.8 K/UL (ref 1.8–8)
NEUTS SEG NFR BLD: 84 % (ref 32–75)
NRBC # BLD: 0 K/UL (ref 0–0.01)
NRBC BLD-RTO: 0 PER 100 WBC
OSMOLALITY UR: 390 MOSM/KG H2O
PHOSPHATE SERPL-MCNC: 2.3 MG/DL (ref 2.6–4.7)
PLATELET # BLD AUTO: 224 K/UL (ref 150–400)
PMV BLD AUTO: 10.2 FL (ref 8.9–12.9)
POTASSIUM SERPL-SCNC: 3.3 MMOL/L (ref 3.5–5.1)
RBC # BLD AUTO: 3.88 M/UL (ref 3.8–5.2)
RBC MORPH BLD: ABNORMAL
SODIUM SERPL-SCNC: 128 MMOL/L (ref 136–145)
SODIUM UR-SCNC: 8 MMOL/L
WBC # BLD AUTO: 11.6 K/UL (ref 3.6–11)

## 2024-05-11 PROCEDURE — 83935 ASSAY OF URINE OSMOLALITY: CPT

## 2024-05-11 PROCEDURE — 1100000000 HC RM PRIVATE

## 2024-05-11 PROCEDURE — 86738 MYCOPLASMA ANTIBODY: CPT

## 2024-05-11 PROCEDURE — 87449 NOS EACH ORGANISM AG IA: CPT

## 2024-05-11 PROCEDURE — 2580000003 HC RX 258: Performed by: HOSPITALIST

## 2024-05-11 PROCEDURE — 6360000002 HC RX W HCPCS: Performed by: INTERNAL MEDICINE

## 2024-05-11 PROCEDURE — 6360000002 HC RX W HCPCS: Performed by: HOSPITALIST

## 2024-05-11 PROCEDURE — 36415 COLL VENOUS BLD VENIPUNCTURE: CPT

## 2024-05-11 PROCEDURE — 6370000000 HC RX 637 (ALT 250 FOR IP): Performed by: INTERNAL MEDICINE

## 2024-05-11 PROCEDURE — 2580000003 HC RX 258: Performed by: INTERNAL MEDICINE

## 2024-05-11 PROCEDURE — 85025 COMPLETE CBC W/AUTO DIFF WBC: CPT

## 2024-05-11 PROCEDURE — 84300 ASSAY OF URINE SODIUM: CPT

## 2024-05-11 PROCEDURE — 80069 RENAL FUNCTION PANEL: CPT

## 2024-05-11 RX ORDER — SODIUM CHLORIDE 1 G/1
2 TABLET ORAL 2 TIMES DAILY WITH MEALS
Status: DISCONTINUED | OUTPATIENT
Start: 2024-05-11 | End: 2024-05-12 | Stop reason: HOSPADM

## 2024-05-11 RX ADMIN — ACETAMINOPHEN 650 MG: 325 TABLET ORAL at 06:16

## 2024-05-11 RX ADMIN — SODIUM CHLORIDE 2 G: 1 TABLET ORAL at 12:47

## 2024-05-11 RX ADMIN — SODIUM CHLORIDE 2 G: 1 TABLET ORAL at 17:20

## 2024-05-11 RX ADMIN — POTASSIUM CHLORIDE 40 MEQ: 750 TABLET, FILM COATED, EXTENDED RELEASE ORAL at 11:31

## 2024-05-11 RX ADMIN — SODIUM CHLORIDE, PRESERVATIVE FREE 10 ML: 5 INJECTION INTRAVENOUS at 17:22

## 2024-05-11 RX ADMIN — ENOXAPARIN SODIUM 40 MG: 100 INJECTION SUBCUTANEOUS at 11:31

## 2024-05-11 RX ADMIN — AZITHROMYCIN MONOHYDRATE 500 MG: 500 INJECTION, POWDER, LYOPHILIZED, FOR SOLUTION INTRAVENOUS at 07:20

## 2024-05-11 RX ADMIN — WATER 1000 MG: 1 INJECTION INTRAMUSCULAR; INTRAVENOUS; SUBCUTANEOUS at 17:05

## 2024-05-11 RX ADMIN — ATORVASTATIN CALCIUM 40 MG: 40 TABLET, FILM COATED ORAL at 20:57

## 2024-05-11 RX ADMIN — SODIUM CHLORIDE, PRESERVATIVE FREE 10 ML: 5 INJECTION INTRAVENOUS at 20:57

## 2024-05-11 RX ADMIN — BUPROPION HYDROCHLORIDE 150 MG: 150 TABLET, FILM COATED, EXTENDED RELEASE ORAL at 11:31

## 2024-05-11 RX ADMIN — FAMOTIDINE 20 MG: 20 TABLET ORAL at 11:41

## 2024-05-11 RX ADMIN — ONDANSETRON 4 MG: 2 INJECTION INTRAMUSCULAR; INTRAVENOUS at 11:16

## 2024-05-11 RX ADMIN — ASPIRIN 325 MG: 325 TABLET ORAL at 11:31

## 2024-05-11 RX ADMIN — ACETAMINOPHEN 650 MG: 325 TABLET ORAL at 20:57

## 2024-05-12 VITALS
TEMPERATURE: 99.5 F | HEIGHT: 62 IN | HEART RATE: 75 BPM | BODY MASS INDEX: 33.25 KG/M2 | OXYGEN SATURATION: 93 % | WEIGHT: 180.7 LBS | RESPIRATION RATE: 16 BRPM | DIASTOLIC BLOOD PRESSURE: 71 MMHG | SYSTOLIC BLOOD PRESSURE: 159 MMHG

## 2024-05-12 LAB
ALBUMIN SERPL-MCNC: 2 G/DL (ref 3.5–5)
ANION GAP SERPL CALC-SCNC: 6 MMOL/L (ref 5–15)
BACTERIA SPEC CULT: NORMAL
BUN SERPL-MCNC: 15 MG/DL (ref 6–20)
BUN/CREAT SERPL: 17 (ref 12–20)
CALCIUM SERPL-MCNC: 8.4 MG/DL (ref 8.5–10.1)
CHLORIDE SERPL-SCNC: 104 MMOL/L (ref 97–108)
CO2 SERPL-SCNC: 25 MMOL/L (ref 21–32)
CREAT SERPL-MCNC: 0.9 MG/DL (ref 0.55–1.02)
GLUCOSE SERPL-MCNC: 99 MG/DL (ref 65–100)
PHOSPHATE SERPL-MCNC: 2.9 MG/DL (ref 2.6–4.7)
POTASSIUM SERPL-SCNC: 3.6 MMOL/L (ref 3.5–5.1)
SERVICE CMNT-IMP: NORMAL
SODIUM SERPL-SCNC: 135 MMOL/L (ref 136–145)

## 2024-05-12 PROCEDURE — 6360000002 HC RX W HCPCS: Performed by: INTERNAL MEDICINE

## 2024-05-12 PROCEDURE — 6370000000 HC RX 637 (ALT 250 FOR IP): Performed by: INTERNAL MEDICINE

## 2024-05-12 PROCEDURE — 2580000003 HC RX 258: Performed by: HOSPITALIST

## 2024-05-12 PROCEDURE — 36415 COLL VENOUS BLD VENIPUNCTURE: CPT

## 2024-05-12 PROCEDURE — 80069 RENAL FUNCTION PANEL: CPT

## 2024-05-12 PROCEDURE — 2580000003 HC RX 258: Performed by: INTERNAL MEDICINE

## 2024-05-12 PROCEDURE — 6360000002 HC RX W HCPCS: Performed by: HOSPITALIST

## 2024-05-12 RX ORDER — LEVOFLOXACIN 750 MG/1
750 TABLET, FILM COATED ORAL DAILY
Qty: 5 TABLET | Refills: 0 | Status: SHIPPED | OUTPATIENT
Start: 2024-05-12 | End: 2024-05-17

## 2024-05-12 RX ADMIN — ENOXAPARIN SODIUM 40 MG: 100 INJECTION SUBCUTANEOUS at 08:05

## 2024-05-12 RX ADMIN — FAMOTIDINE 20 MG: 20 TABLET ORAL at 08:05

## 2024-05-12 RX ADMIN — BUPROPION HYDROCHLORIDE 150 MG: 150 TABLET, FILM COATED, EXTENDED RELEASE ORAL at 08:05

## 2024-05-12 RX ADMIN — SODIUM CHLORIDE 2 G: 1 TABLET ORAL at 08:05

## 2024-05-12 RX ADMIN — SODIUM CHLORIDE, PRESERVATIVE FREE 10 ML: 5 INJECTION INTRAVENOUS at 08:05

## 2024-05-12 RX ADMIN — AZITHROMYCIN MONOHYDRATE 500 MG: 500 INJECTION, POWDER, LYOPHILIZED, FOR SOLUTION INTRAVENOUS at 08:00

## 2024-05-12 RX ADMIN — ASPIRIN 325 MG: 325 TABLET ORAL at 08:05

## 2024-05-12 NOTE — PROGRESS NOTES
[facility]                                                                                          Hospitalist Progress Note  Roque Pan MD  Office Phone: [phone]        Date of Service:  2024  NAME:  Yanet Posada  :  1950  MRN:  [id number]       Admission Summary:     Yanet Posada is a 74 y.o. female with PMH of HTN, HLD, TIA, Depression presented to ED for evaluation of Altered mental status since yesterday. Patient is alert and oriented x 3, but poor historian, most of hx obtained from  at bedside. She had a fall yesterday morning, she unable to provide any detail how she fell, denied head injury, c/w right buttock pain where she fell. EMS called but declined hospital transport.  noticed worsening mentation and so brought to ED today. She denied fever, sob, cough, abd pain, urinary or bowel changes. She has not been eating for 2 days   In ED, labs showed Na 122. CT head- no acute process. RUQ US abd ordered. Hospitalist consulted for admission       Interval history / Subjective:     Patient continues to have fever  Legionella antigen as it can cause hyponatremia high-grade fever     Assessment & Plan:        Intermittent fever  Leukocytosis  Bacterial pneumonia  -CT chest abdomen pelvis 2024 positive for dense consolidative airspace disease left lower lobe consistent with pneumonia.  Blood cultures no growth to date COVID-19 negative    -Continue ceftriaxone azithromycin  -Check Legionella antigen MRSA nares  -Check mycoplasma pneumonia  -Sputum culture  -Trend fever curve    Hypotonic hyponatremia in setting of HCTZ use  -Sodium 128  -Hold HCTZ and SSRI  -TSH 0.8 cortisol 25.9  -Nephrology on board    Altered mental status due to metabolic encephalopathy  -Supportive care by treating underlying cause    History of TIA  History of hyperlipidemia  History of depression  History of hypertension  -Resume home regimen except 
        [facility]                                                                                          Hospitalist Progress Note  Sushma Madala, MD  Office Phone: [phone]        Date of Service:  2024  NAME:  Yanet Posada  :  1950  MRN:  [id number]       Admission Summary:   Yanet Posada is a 74 y.o. female with PMH of HTN, HLD, TIA, Depression presented to ED for evaluation of Altered mental status since yesterday. Patient is alert and oriented x 3, but poor historian, most of hx obtained from  at bedside. She had a fall yesterday morning, she unable to provide any detail how she fell, denied head injury, c/w right buttock pain where she fell. EMS called but declined hospital transport.  noticed worsening mentation and so brought to ED today. She denied fever, sob, cough, abd pain, urinary or bowel changes. She has not been eating for 2 days   In ED, labs showed Na 122. CT head- no acute process. RUQ US abd ordered. Hospitalist consulted for admission.    Interval history / Subjective:   Patient is seen and examined at bedside this AM. Family including  present. She feels better, mentation improving, slow to respond. Febrile yesterday in ED. UA not done yet.   Discussed with cm     Labs - cbc not done today , reordered      Assessment & Plan:      Altered Mental status - improving   Likely due to Hyponatremia   - Na 122 on poa  - likely prerenal   - CT head: No evidence for acute intracranial abnormality    - MRI brain : no acute process   - c/w IVF  - nephrology consult placed   - will monitor BMP q4h      Sepsis - unclear etiology   Leucocytosis and Febrile  - r/o infection source   - normal lactic. Procal 0.8. resp viral panel - neg   - CXR: no clear   - UA pending   - US abd: Status post cholecystectomy. No biliary ductal dilatation.   - blood cx: NGTD  -  started on IV ceftriaxone      Hypokalemia   - replaced, will monitor    
    [address], [place] [address]  Phone: [phone]   [phone]    [email or web address]     Nephrology Progress Note    Patient Name : Yanet Posada      : 1950     MRN : [id number]  Date of Admission : 2024  Date of Servive : 24    CC:  Follow up for Hyponatremia       Assessment and Plan   Hyponatremia:  - from SAIDH from SSRI + thaizide  - urine studies pending  - TSH, AM cortisol ok  - Na 128 from 129   - start NaCl 2g BID  - cont FR 1.2 L/d  - address n/v/pain to lessen chances of ADH effect  - hold thiazide indefinitely and hold SSRI for now  - daily Na levels      Hypokalemia:  - replete PRN     PNA:  - on ceftriaxone and azithromycin     Hx of TIA  HTN  Depression  HLD     Interval History:  Seen and examined. +nausea but no vomiting. No diarrhea. Mild pain.  On Abx for PNA.  Still with fevers.  at bedside.     Review of Systems: Pertinent items are noted in HPI.    Current Medications:   Current Facility-Administered Medications   Medication Dose Route Frequency    sodium chloride tablet 2 g  2 g Oral BID WC    azithromycin (ZITHROMAX) 500 mg in sodium chloride 0.9 % 250 mL IVPB ([id number])  500 mg IntraVENous Q24H    cefTRIAXone (ROCEPHIN) 1,000 mg in sterile water 10 mL IV syringe  1,000 mg IntraVENous Q24H    sodium chloride flush 0.9 % injection 5-40 mL  5-40 mL IntraVENous 2 times per day    sodium chloride flush 0.9 % injection 5-40 mL  5-40 mL IntraVENous PRN    0.9 % sodium chloride infusion   IntraVENous PRN    potassium chloride (KLOR-CON) extended release tablet 40 mEq  40 mEq Oral PRN    Or    potassium bicarb-citric acid (EFFER-K) effervescent tablet 40 mEq  40 mEq Oral PRN    Or    potassium chloride 10 mEq/100 mL IVPB (Peripheral Line)  10 mEq IntraVENous PRN    magnesium sulfate 2000 mg in 50 mL IVPB premix  2,000 mg IntraVENous PRN    enoxaparin (LOVENOX) injection 40 mg  40 mg SubCUTAneous Daily 
    [address], [facility] [place] [address]  Phone: [phone]   [phone]    [email or web address]     Nephrology Progress Note    Patient Name : Yanet Posada      : 1950     MRN : [id number]  Date of Admission : 2024  Date of Servive : 24    CC:  Follow up for Hyponatremia       Assessment and Plan   Hyponatremia:  - from SAIDH from SSRI + thaizide  - urine studies pending, TSH ok, AM cortisol 36  - Na improving  - cont FR 1.2 L/d  - hold thiazide and SSRI  - daily Na levels     Hypokalemia:  - replete PRN     Leukocytosis:  - on rocephin  - cx negative, CXR neg     Hx of TIA  HTN  Depression  HLD     Interval History:  Seen and examined.  Na 129.  Up walking with PT.  Feeling better this AM.  No cp, sob reported    Review of Systems: Pertinent items are noted in HPI.    Current Medications:   Current Facility-Administered Medications   Medication Dose Route Frequency    cefTRIAXone (ROCEPHIN) 1,000 mg in sterile water 10 mL IV syringe  1,000 mg IntraVENous Q24H    sodium chloride flush 0.9 % injection 5-40 mL  5-40 mL IntraVENous 2 times per day    sodium chloride flush 0.9 % injection 5-40 mL  5-40 mL IntraVENous PRN    0.9 % sodium chloride infusion   IntraVENous PRN    potassium chloride (KLOR-CON) extended release tablet 40 mEq  40 mEq Oral PRN    Or    potassium bicarb-citric acid (EFFER-K) effervescent tablet 40 mEq  40 mEq Oral PRN    Or    potassium chloride 10 mEq/100 mL IVPB (Peripheral Line)  10 mEq IntraVENous PRN    magnesium sulfate 2000 mg in 50 mL IVPB premix  2,000 mg IntraVENous PRN    enoxaparin (LOVENOX) injection 40 mg  40 mg SubCUTAneous Daily    ondansetron (ZOFRAN-ODT) disintegrating tablet 4 mg  4 mg Oral Q8H PRN    Or    ondansetron (ZOFRAN) injection 4 mg  4 mg IntraVENous Q6H PRN    polyethylene glycol (GLYCOLAX) packet 17 g  17 g Oral Daily PRN    acetaminophen (TYLENOL) tablet 650 mg  650 mg Oral Q6H PRN 
    [address], [facility] [place] [address]  Phone: [phone]   [phone]    [email or web address]     Nephrology Progress Note    Patient Name : Yanet Posada      : 1950     MRN : [id number]  Date of Admission : 2024  Date of Servive : 05/10/24    CC:  Follow up for Hyponatremia       Assessment and Plan   Hyponatremia:  - from SAIDH from SSRI + thaizide  - urine studies pending, TSH ok, AM cortisol 36  - Na improving yesterday, pending this AM  - cont FR 1.2 L/d  - hold thiazide and SSRI  - daily Na levels for now     Hypokalemia:  - replete PRN     PNA:  - on ceftriaxone and azithromycin     Hx of TIA  HTN  Depression  HLD     Interval History:  Seen and examined. No sodium level today.  On Abx for PNA.  Still with fevers.  Feeling better this AM.  No cp, sob reported    Review of Systems: Pertinent items are noted in HPI.    Current Medications:   Current Facility-Administered Medications   Medication Dose Route Frequency    azithromycin (ZITHROMAX) 500 mg in sodium chloride 0.9 % 250 mL IVPB ([id number])  500 mg IntraVENous Q24H    cefTRIAXone (ROCEPHIN) 1,000 mg in sterile water 10 mL IV syringe  1,000 mg IntraVENous Q24H    sodium chloride flush 0.9 % injection 5-40 mL  5-40 mL IntraVENous 2 times per day    sodium chloride flush 0.9 % injection 5-40 mL  5-40 mL IntraVENous PRN    0.9 % sodium chloride infusion   IntraVENous PRN    potassium chloride (KLOR-CON) extended release tablet 40 mEq  40 mEq Oral PRN    Or    potassium bicarb-citric acid (EFFER-K) effervescent tablet 40 mEq  40 mEq Oral PRN    Or    potassium chloride 10 mEq/100 mL IVPB (Peripheral Line)  10 mEq IntraVENous PRN    magnesium sulfate 2000 mg in 50 mL IVPB premix  2,000 mg IntraVENous PRN    enoxaparin (LOVENOX) injection 40 mg  40 mg SubCUTAneous Daily    ondansetron (ZOFRAN-ODT) disintegrating tablet 4 mg  4 mg Oral Q8H PRN    Or    ondansetron (ZOFRAN) injection 4 
  Physician Progress Note      PATIENT:               GRETCHEN ROMERO  CSN #:                  [id number]  :                       1950  ADMIT DATE:       2024 1:50 PM  DISCH DATE:  RESPONDING  PROVIDER #:        Mark Mcnally MD          QUERY TEXT:      Dear Attending physician,        The patient was admitted with AMS and elevated WBC.  Her CXR was negative. The   patient?s temperature was max of 100.8 on  and she received Tylenol x 1 on   that date.    On  the patient had a temperature of 101.1 with elevated WBC and   neutrophils with documentation of sepsis-unclear etiology. A CT of the abdomen   showed- Dense consolidative airspace disease in the left lower lobe   consistent with  pneumonia    If possible, please document in the progress notes and discharge summary if   you are evaluating and / or treating any of the following:      The medical record reflects the following:  Risk Factors: has hyponatremia    Clinical Indicators: -WBC 16.2, neutrophils 83, -wbc 16.2, neutrophils   85, -temp 101.1  -cxr- IMPRESSION:  No acute abnormality.  -Per CT abdomen- IMPRESSION:  Dense consolidative airspace disease in the left lower lobe consistent with  pneumonia.  No evidence of intra-abdominal pathology.    Per H&P- Leucocytosis  r/o infection source  CXR: no clear  UA pending  US abd: Status post cholecystectomy. No biliary ductal dilatation.  will monitor off abx  -Per PN- Sepsis - unclear etiology  Leucocytosis and Febrile  r/o infection source    Treatment: IV Ceftriaxone, Monitor vital signs, labwork, imaging, pulse   oximetry, neuro status, Tylenol 650 mg po x 1 on , x 3 on , x 3 on     Thank you    Ronda Melenedz RN, BSN, CRCR, CCDS  Clinical Documentation Improvement  Office: [phone] or via [employer]  Options provided:  -- Yes, sepsis was present at the time of the order to admit to the hospital  -- No, sepsis was not present on admission and developed during the 
  Physician Progress Note      PATIENT:               GRETCHEN ROMERO  [facility] #:                  [id number]  :                       1950  ADMIT DATE:       2024 1:50 PM  DISCH DATE:  RESPONDING  PROVIDER #:        Mark Mcnally MD          QUERY TEXT:      Dear Attending physician,      Pt admitted with documentation of- Altered Mental status - improving  Likely due to Hyponatremia    Pt also noted to have documentation of sepsis-unclear etiology.    If possible, please document in the progress notes and discharge summary if   you are evaluating and / or treating any of the following:    The medical record reflects the following:  Risk Factors: has hyponatremia    Clinical Indicators: -WBC 16.2, neutrophils 83, -temp 101.1  Per ED MDCornelio says that on Monday morning she got up around 530 to use the   bathroom and heard her fall.  Since that time she has had some confusion some   increased speech difficulty and has not been able to ambulate on her own.    This is distinctly new for her  Altered Mental status - improving  Likely due to Hyponatremia  Na 122 on poa  ?SIADH  CT head: No evidence for acute intracranial abnormality  MRI brain : no acute process  Per H&P- Alert x oriented x 3, awake, no acute distress  -Per PN- Sepsis - unclear etiology  Leucocytosis and Febrile  r/o infection source    Treatment: IV Ceftriaxone, Monitor vital signs, labwork, imaging, pulse   oximetry, neuro status    Thank you    Ronda Melendez RN, BSN, CRCR, CCDS  Clinical Documentation Improvement  Office: [phone] or via [employer]  Options provided:  -- Septic encephalopathy  -- Metabolic encephalopathy  -- Encephalopathy due to, please specify cause  -- Other - I will add my own diagnosis  -- Disagree - Not applicable / Not valid  -- Disagree - Clinically unable to determine / Unknown  -- Refer to Clinical Documentation Reviewer    PROVIDER RESPONSE TEXT:    This patient has septic encephalopathy.    Query created by: 
1700: Patient oral temperature 100.8 F. Administered Tylenol. MD aware.    Rechecked oral temp, resulted 100.7 F. Notified MD. Orders received. See Results.  
1730 Bladder scan showing 674cc  Straight cath complete at this time with 400cc out.    1930 Bedside shift change report given to Dayron RN (oncoming nurse) by Erika RN (offgoing nurse). Report included the following information Nurse Handoff Report, Index, Intake/Output, MAR, Recent Results, and Cardiac Rhythm NSR .      
2200: Temp elevated once arrived to unit. 102.9 fever. Paged On-call, given tylenol. No new orders placed. Informed that fever has been intermittent since admission    Temp came down to 98.2 after med.    3 AM vitals, temp spiked to 100.6.    0610: Rechecked temp, now 102.9. Paged On-call again. Given tylenol    0715: Temp now 98.4      
Brief renal note:    Hyponatremia from SAIDH from SSRI + thaizide  - urine studies pending  - TSH, AM cortisol ok  - Na 128 --> 135   - decrease NaCl 2g BID to 1g BID  - cont FR 1.5 L/d  - address n/v/pain to lessen chances of ADH effect  - hold thiazide indefinitely and may resume SSRI   - daily Na levels      Hypokalemia:  - repleted      PNA:  - on ceftriaxone and azithromycin     Hx of TIA  HTN  Depression  HLD      No renal objection to DC if pursued. Follow up at [facility] in 7-10 days    Claudy Maddox MD  RNA  
End of Shift Note  guadalupe  Bedside shift change report given to MAZIN James (oncoming nurse) by Joel Vázquez RN (offgoing nurse).  Report included the following information SBAR, Kardex, ED Summary, Procedure Summary, Recent Results, Med Rec Status, and Cardiac Rhythm s/t    Shift worked:  7p-7a     Shift summary and any significant changes:     Patient was transferred from the ED to the after 9 pm. Pt alert and oriented x4, delayed response, denies pain, mild shakiness to hands, abdomen soft and none tender, bowel sounds present on all quadrants, continent to both bowel and bladder.     Concerns for physician to address:  New admit.     Zone phone for oncoming shift:            Activity:     Number times ambulated in hallways past shift: 0  Number of times OOB to chair past shift: 1    Cardiac:   Cardiac Monitoring: Yes           Access:  Current line(s): PIV     Genitourinary:   Urinary status: voiding    Respiratory:      Chronic home O2 use?: NO  Incentive spirometer at bedside: NO       GI:     Current diet:  ADULT DIET; Regular  Passing flatus: YES  Tolerating current diet: YES       Pain Management:   Patient states pain is manageable on current regimen: YES    Skin:     Interventions: float heels, increase time out of bed, PT/OT consult, and internal/external urinary devices    Patient Safety:  Fall Score:    Interventions: bed/chair alarm, assistive device (walker, cane. etc), and pt to call before getting OOB       Length of Stay:  Expected LOS: 4  Actual LOS: 1      Joel Váqzuez RN                           
End of Shift Note  guadalupe  Bedside shift change report given to MAZIN James (oncoming nurse) by Joel Vázquez RN (offgoing nurse).  Report included the following information SBAR, Kardex, ED Summary, Procedure Summary, Recent Results, Med Rec Status, and Cardiac Rhythm s/t  Shift worked:  7p-7a     Shift summary and any significant changes:     Patient  bladder scan 566 ml noted, hospitalist notified and order for straight cath.   Pt straight cath, removed 450 ml, also prn tylenol 650 mg given for fever.   Concerns for physician to address:  New admit.     Zone phone for oncoming shift:            Activity:  Number times ambulated in hallways past shift: 0  Number of times OOB to chair past shift: 1    Cardiac:   Cardiac Monitoring: Yes           Access:  Current line(s): PIV     Genitourinary:   Urinary status: voiding    Respiratory:      Chronic home O2 use?: NO  Incentive spirometer at bedside: NO       GI:     Current diet:  ADULT DIET; Regular; 1200 ml  Passing flatus: YES  Tolerating current diet: YES       Pain Management:   Patient states pain is manageable on current regimen: YES    Skin:     Interventions: float heels, increase time out of bed, PT/OT consult, and internal/external urinary devices    Patient Safety:  Fall Score:    Interventions: bed/chair alarm, assistive device (walker, cane. etc), and pt to call before getting OOB       Length of Stay:  Expected LOS: 4  Actual LOS: 2      Joel Vázquez RN                           
input(s): \"TIBC\", \"FERR\" in the last 72 hours.    Invalid input(s): \"FE\", \"PSAT\"   No results found for: \"RBCF\"   No results for input(s): \"PH\", \"PCO2\", \"PO2\" in the last 72 hours.  No results for input(s): \"CPK\" in the last 72 hours.    Invalid input(s): \"CPKMB\", \"CKNDX\", \"TROIQ\"  Lab Results   Component Value Date/Time    CHOL 108 01/08/2024 10:06 AM    HDL 45 01/08/2024 10:06 AM    LDL 48.2 01/08/2024 10:06 AM     No results found for: \"GLUCPOC\"  [unfilled]    Notes reviewed from all clinical/nonclinical/nursing services involved in patient's clinical care. Care coordination discussions were held with appropriate clinical/nonclinical/ nursing providers based on care coordination needs.         Patients current active Medications were reviewed, considered, added and adjusted based on the clinical condition today.      Home Medications were reconciled to the best of my ability given all available resources at the time of admission. Route is PO if not otherwise noted.      Admission Status:[id number]:::1}      Medications Reviewed:     Current Facility-Administered Medications   Medication Dose Route Frequency    cefTRIAXone (ROCEPHIN) 1,000 mg in sterile water 10 mL IV syringe  1,000 mg IntraVENous Q24H    sodium chloride flush 0.9 % injection 5-40 mL  5-40 mL IntraVENous 2 times per day    sodium chloride flush 0.9 % injection 5-40 mL  5-40 mL IntraVENous PRN    0.9 % sodium chloride infusion   IntraVENous PRN    potassium chloride (KLOR-CON) extended release tablet 40 mEq  40 mEq Oral PRN    Or    potassium bicarb-citric acid (EFFER-K) effervescent tablet 40 mEq  40 mEq Oral PRN    Or    potassium chloride 10 mEq/100 mL IVPB (Peripheral Line)  10 mEq IntraVENous PRN    magnesium sulfate 2000 mg in 50 mL IVPB premix  2,000 mg IntraVENous PRN    enoxaparin (LOVENOX) injection 40 mg  40 mg SubCUTAneous Daily    ondansetron (ZOFRAN-ODT) disintegrating tablet 4 mg  4 mg Oral Q8H PRN    Or    ondansetron (ZOFRAN) injection 
results for input(s): \"TIBC\", \"FERR\" in the last 72 hours.    Invalid input(s): \"FE\", \"PSAT\"   No results found for: \"RBCF\"   No results for input(s): \"PH\", \"PCO2\", \"PO2\" in the last 72 hours.  No results for input(s): \"CPK\" in the last 72 hours.    Invalid input(s): \"CPKMB\", \"CKNDX\", \"TROIQ\"  Lab Results   Component Value Date/Time    CHOL 108 01/08/2024 10:06 AM    HDL 45 01/08/2024 10:06 AM    LDL 48.2 01/08/2024 10:06 AM     No results found for: \"GLUCPOC\"  [unfilled]    Notes reviewed from all clinical/nonclinical/nursing services involved in patient's clinical care. Care coordination discussions were held with appropriate clinical/nonclinical/ nursing providers based on care coordination needs.         Patients current active Medications were reviewed, considered, added and adjusted based on the clinical condition today.      Home Medications were reconciled to the best of my ability given all available resources at the time of admission. Route is PO if not otherwise noted.      Admission Status:[id number]:::1}      Medications Reviewed:     Current Facility-Administered Medications   Medication Dose Route Frequency    azithromycin (ZITHROMAX) 500 mg in sodium chloride 0.9 % 250 mL IVPB ([id number])  500 mg IntraVENous Q24H    cefTRIAXone (ROCEPHIN) 1,000 mg in sterile water 10 mL IV syringe  1,000 mg IntraVENous Q24H    sodium chloride flush 0.9 % injection 5-40 mL  5-40 mL IntraVENous 2 times per day    sodium chloride flush 0.9 % injection 5-40 mL  5-40 mL IntraVENous PRN    0.9 % sodium chloride infusion   IntraVENous PRN    potassium chloride (KLOR-CON) extended release tablet 40 mEq  40 mEq Oral PRN    Or    potassium bicarb-citric acid (EFFER-K) effervescent tablet 40 mEq  40 mEq Oral PRN    Or    potassium chloride 10 mEq/100 mL IVPB (Peripheral Line)  10 mEq IntraVENous PRN    magnesium sulfate 2000 mg in 50 mL IVPB premix  2,000 mg IntraVENous PRN    enoxaparin (LOVENOX) injection 40 mg  40 mg SubCUTAneous

## 2024-05-12 NOTE — DISCHARGE SUMMARY
Discharge Summary   Please note that this dictation was completed with [employer], the computer voice recognition software.  Quite often unanticipated grammatical, syntax, homophones, and other interpretive errors are inadvertently transcribed by the computer software.  Please disregard these errors.  Please excuse any errors that have escaped final proofreading.    PATIENT ID: Yanet Posada  MRN: [id number]   YOB: 1950    DATE OF ADMISSION: 5/7/2024  1:50 PM    DATE OF DISCHARGE: 5/12/2024  PRIMARY CARE PROVIDER: Tani Weiss MD         ATTENDING PHYSICIAN: Roque Pan MD  DISCHARGING PROVIDER: Roque Pan MD       CONSULTATIONS: IP CONSULT TO NEPHROLOGY  IP CONSULT TO CASE MANAGEMENT  IP CONSULT TO CASE MANAGEMENT  IP CONSULT TO CASE MANAGEMENT    PROCEDURES/SURGERIES: * No surgery found *    ADMITTING HPI from excerpted H&P       Yanet Posada is a 74 y.o. female with PMH of HTN, HLD, TIA, Depression presented to ED for evaluation of Altered mental status since yesterday. Patient is alert and oriented x 3, but poor historian, most of hx obtained from  at bedside. She had a fall yesterday morning, she unable to provide any detail how she fell, denied head injury, c/w right buttock pain where she fell. EMS called but declined hospital transport.  noticed worsening mentation and so brought to ED today. She denied fever, sob, cough, abd pain, urinary or bowel changes. She has not been eating for 2 days   In ED, labs showed Na 122. CT head- no acute process. RUQ US abd ordered. Hospitalist consulted for admission      HOSPITAL COURSE & DISCHARGE DIAGNOSIS/ PLAN:       Intermittent fever improved  Leukocytosis resolved  Bacterial pneumonia  -CT chest abdomen pelvis 5/9/2024 positive for dense consolidative airspace disease left lower lobe consistent with pneumonia.  Blood cultures no growth to date COVID-19 negative. MRSA and legionella ag      -Patient was discharged home on

## 2024-05-12 NOTE — DISCHARGE INSTRUCTIONS
- Please complete antibiotic course and follow up PCP   - Hold Hydrochlorothiazide and Fluoxetine until followed up by PCP

## 2024-05-12 NOTE — PLAN OF CARE
Problem: ABCDS Injury Assessment  Goal: Absence of physical injury  Outcome: Progressing     Problem: Risk for Elopement  Goal: Patient will not exit the unit/facility without proper excort  Outcome: Progressing     Problem: Pain  Goal: Verbalizes/displays adequate comfort level or baseline comfort level  Outcome: Progressing     
  Problem: Discharge Planning  Goal: Discharge to home or other facility with appropriate resources  5/12/2024 1352 by Ailin Delgado, RN  Outcome: Completed  5/12/2024 1023 by Ailin Delgado, RN  Outcome: Progressing     
  Problem: Discharge Planning  Goal: Discharge to home or other facility with appropriate resources  5/9/2024 2123 by Renea Delaney RN  Outcome: Progressing     Problem: Safety - Adult  Goal: Free from fall injury  5/10/2024 1045 by Rip Franco, RN  Outcome: Progressing  5/9/2024 2123 by Renea Delaney RN  Outcome: Progressing     Problem: ABCDS Injury Assessment  Goal: Absence of physical injury  5/10/2024 1045 by Rip Franco, RN  Outcome: Progressing  5/9/2024 2123 by Renea Delaney RN  Outcome: Progressing     
  Problem: Discharge Planning  Goal: Discharge to home or other facility with appropriate resources  Outcome: Progressing     
  Problem: Discharge Planning  Goal: Discharge to home or other facility with appropriate resources  Outcome: Progressing     Problem: Safety - Adult  Goal: Free from fall injury  5/10/2024 2047 by Renea Delaney RN  Outcome: Progressing  5/10/2024 1045 by Rip Franco RN  Outcome: Progressing     Problem: ABCDS Injury Assessment  Goal: Absence of physical injury  5/10/2024 2047 by Renea Delaney RN  Outcome: Progressing  5/10/2024 1045 by Rip Franco RN  Outcome: Progressing     Problem: Risk for Elopement  Goal: Patient will not exit the unit/facility without proper excort  Outcome: Progressing     Problem: Pain  Goal: Verbalizes/displays adequate comfort level or baseline comfort level  Outcome: Progressing     
  Problem: Discharge Planning  Goal: Discharge to home or other facility with appropriate resources  Outcome: Progressing     Problem: Safety - Adult  Goal: Free from fall injury  Outcome: Progressing     Problem: ABCDS Injury Assessment  Goal: Absence of physical injury  Outcome: Progressing     Problem: Risk for Elopement  Goal: Patient will not exit the unit/facility without proper excort  Outcome: Progressing  Flowsheets  Taken 5/9/2024 2040 by Renea Delaney, RN  Nursing Interventions for Elopement Risk: Assist with personal care needs such as toileting, eating, dressing, as needed to reduce the risk of wandering  Taken 5/9/2024 0800 by Marimar Montaño, RN  Nursing Interventions for Elopement Risk: Assist with personal care needs such as toileting, eating, dressing, as needed to reduce the risk of wandering     Problem: Pain  Goal: Verbalizes/displays adequate comfort level or baseline comfort level  Outcome: Progressing     Problem: Physical Therapy - Adult  Goal: By Discharge: Performs mobility at highest level of function for planned discharge setting.  See evaluation for individualized goals.  Description: FUNCTIONAL STATUS PRIOR TO ADMISSION: Patient was independent and active without use of DME.    HOME SUPPORT PRIOR TO ADMISSION: The patient lived with her .    Physical Therapy Goals  Initiated 5/8/2024  1.  Patient will move from supine to sit and sit to supine, scoot up and down, and roll side to side in bed with modified independence within 7 day(s).    2.  Patient will perform sit to stand with modified independence within 7 day(s).  3.  Patient will transfer from bed to chair and chair to bed with modified independence using the least restrictive device within 7 day(s).  4.  Patient will ambulate with modified independence for 150 feet with the least restrictive device within 7 day(s).   5.  Patient will ascend/descend 9 stairs with B handrail(s) with supervision/set-up within 7 
  Problem: Discharge Planning  Goal: Discharge to home or other facility with appropriate resources  Recent Flowsheet Documentation  Taken 5/11/2024 0800 by Misty Feldman, RN  Discharge to home or other facility with appropriate resources:   Identify barriers to discharge with patient and caregiver   Arrange for needed discharge resources and transportation as appropriate   Identify discharge learning needs (meds, wound care, etc)     Problem: Safety - Adult  Goal: Free from fall injury  5/11/2024 0123 by Álvaro Bell, RN  Outcome: Progressing     Problem: Chronic Conditions and Co-morbidities  Goal: Patient's chronic conditions and co-morbidity symptoms are monitored and maintained or improved  Recent Flowsheet Documentation  Taken 5/11/2024 0800 by Misty Feldman, RN  Care Plan - Patient's Chronic Conditions and Co-Morbidity Symptoms are Monitored and Maintained or Improved:   Monitor and assess patient's chronic conditions and comorbid symptoms for stability, deterioration, or improvement   Collaborate with multidisciplinary team to address chronic and comorbid conditions and prevent exacerbation or deterioration   Update acute care plan with appropriate goals if chronic or comorbid symptoms are exacerbated and prevent overall improvement and discharge  5/11/2024 0123 by Álvaro Bell, RN  Outcome: Progressing     
  Problem: Occupational Therapy - Adult  Goal: By Discharge: Performs self-care activities at highest level of function for planned discharge setting.  See evaluation for individualized goals.  Description: FUNCTIONAL STATUS PRIOR TO ADMISSION:  Patient was ambulatory without use of DME.     HOME SUPPORT: Patient lived with her  and was independent with all ADLs. .    Occupational Therapy Goals:  Initiated 5/8/2024  1.  Patient will perform grooming routine with Modified [place] within 7 day(s).  2.  Patient will perform upper and lower body bathing with Modified [place] within 7 day(s).  3.  Patient will perform upper and lower body dressing with Modified [place] within 7 day(s).  4.  Patient will perform toilet transfers with Modified [place]  within 7 day(s).  5.  Patient will perform all aspects of toileting with Modified [place] within 7 day(s).  6.  Patient will participate in upper extremity therapeutic exercise/activities with Supervision for 5 minutes within 7 day(s).      Outcome: Progressing     OCCUPATIONAL THERAPY TREATMENT  Patient: Yanet Posada (74 y.o. female)  Date: 5/10/2024  Primary Diagnosis: Hyponatremia [E87.1]  Altered mental status, unspecified altered mental status type [R41.82]       Precautions: Fall Risk                Chart, occupational therapy assessment, plan of care, and goals were reviewed.    ASSESSMENT  Patient continues to benefit from skilled OT services and is progressing towards goals but remains limited by cognition, activity tolerance, endurance, and general weakness. Patient completed functional activity generally at supervision-CGA level, noted mild BASHIR however SpO2 stable on RA, benefits from cues for pacing, rest breaks, and PLB. Patient requires increased time for simple decision making and processing commands/questions, although asks & answers questions appropriately. Patient with supportive  at bedside who reports patient 
  Problem: Occupational Therapy - Adult  Goal: By Discharge: Performs self-care activities at highest level of function for planned discharge setting.  See evaluation for individualized goals.  Description: FUNCTIONAL STATUS PRIOR TO ADMISSION:  Patient was ambulatory without use of DME.     HOME SUPPORT: Patient lived with her  and was independent with all ADLs. .    Occupational Therapy Goals:  Initiated 5/8/2024  1.  Patient will perform grooming routine with Modified [place] within 7 day(s).  2.  Patient will perform upper and lower body bathing with Modified [place] within 7 day(s).  3.  Patient will perform upper and lower body dressing with Modified [place] within 7 day(s).  4.  Patient will perform toilet transfers with Modified [place]  within 7 day(s).  5.  Patient will perform all aspects of toileting with Modified [place] within 7 day(s).  6.  Patient will participate in upper extremity therapeutic exercise/activities with Supervision for 5 minutes within 7 day(s).      Outcome: Progressing     OCCUPATIONAL THERAPY TREATMENT  Patient: Yanet Posada (74 y.o. female)  Date: 5/9/2024  Primary Diagnosis: Hyponatremia [E87.1]  Altered mental status, unspecified altered mental status type [R41.82]       Precautions: Fall Risk                Chart, occupational therapy assessment, plan of care, and goals were reviewed.    ASSESSMENT  Patient continues to benefit from skilled OT services and is progressing towards goals. Pt cleared for therapy by nursing and received in bathroom following toileting with  assisting. Pt completed standing ADLs at sink with overall SBA and use of RW. Completed functional mobility to chair for transfer with SBA and educated pt on safe RW usage with spouse providing appropriate reminders. PT in room at end of session. Continue to recommend HHOT with family assistance at discharge.          PLAN :  Patient continues to benefit from skilled 
  Problem: Physical Therapy - Adult  Goal: By Discharge: Performs mobility at highest level of function for planned discharge setting.  See evaluation for individualized goals.  Description: FUNCTIONAL STATUS PRIOR TO ADMISSION: Patient was independent and active without use of DME.    HOME SUPPORT PRIOR TO ADMISSION: The patient lived with her .    Physical Therapy Goals  Initiated 5/8/2024  1.  Patient will move from supine to sit and sit to supine, scoot up and down, and roll side to side in bed with modified independence within 7 day(s).    2.  Patient will perform sit to stand with modified independence within 7 day(s).  3.  Patient will transfer from bed to chair and chair to bed with modified independence using the least restrictive device within 7 day(s).  4.  Patient will ambulate with modified independence for 150 feet with the least restrictive device within 7 day(s).   5.  Patient will ascend/descend 9 stairs with B handrail(s) with supervision/set-up within 7 day(s).    Outcome: Progressing   PHYSICAL THERAPY TREATMENT    Patient: Yanet Posada (74 y.o. female)  Date: 5/10/2024  Diagnosis: Hyponatremia [E87.1]  Altered mental status, unspecified altered mental status type [R41.82] Hyponatremia      Precautions: Fall Risk      ASSESSMENT:  Patient continues to benefit from skilled PT services and is progressing towards goals. Pt is pleasant and agreeable to participation today. Yanet completed transfer and gait training with RW adjustment for home use. She demonstrates slow but steady gait pattern with BUE DME support. VSS on RA and NAD throughout requested tasks. Pt is planning to return home with her spouse's support and HHPT. RW delivered. Pt left in the chair with BLEs elevated and with all needs met as able. Pt is appropriate in conversation though continued delay with responses noted. Pt also with shaking/tremoring though she attributes this to being cold- blanket provided.     
  Problem: Physical Therapy - Adult  Goal: By Discharge: Performs mobility at highest level of function for planned discharge setting.  See evaluation for individualized goals.  Description: FUNCTIONAL STATUS PRIOR TO ADMISSION: Patient was independent and active without use of DME.    HOME SUPPORT PRIOR TO ADMISSION: The patient lived with her .    Physical Therapy Goals  Initiated 5/8/2024  1.  Patient will move from supine to sit and sit to supine, scoot up and down, and roll side to side in bed with modified independence within 7 day(s).    2.  Patient will perform sit to stand with modified independence within 7 day(s).  3.  Patient will transfer from bed to chair and chair to bed with modified independence using the least restrictive device within 7 day(s).  4.  Patient will ambulate with modified independence for 150 feet with the least restrictive device within 7 day(s).   5.  Patient will ascend/descend 9 stairs with B handrail(s) with supervision/set-up within 7 day(s).    Outcome: Progressing   PHYSICAL THERAPY TREATMENT    Patient: Yanet Posada (74 y.o. female)  Date: 5/9/2024  Diagnosis: Hyponatremia [E87.1]  Altered mental status, unspecified altered mental status type [R41.82] Hyponatremia      Precautions: Fall Risk                      ASSESSMENT:  Patient continues to benefit from skilled PT services and is progressing towards goals. Pt remains on track for discharge to home. Today she used her rolling walker for amb (was appropriately adjusted for her height) and negotiated stairs using a uni rail as is the set up at home. All of mobility is slow but stable and contact guard. Continue to recommend HHPT         PLAN:  Patient continues to benefit from skilled intervention to address the above impairments.  Continue treatment per established plan of care.    Recommend with staff: therapy recommendations for staff: Recommend mobility with staff assist x1 using rolling walker.    Recommend 
  Problem: Safety - Adult  Goal: Free from fall injury  5/11/2024 0123 by Álvaro Bell RN  Outcome: Progressing  5/10/2024 2047 by Renea Delaney RN  Outcome: Progressing     Problem: Chronic Conditions and Co-morbidities  Goal: Patient's chronic conditions and co-morbidity symptoms are monitored and maintained or improved  5/11/2024 0123 by Álvaro Bell RN  Outcome: Progressing  5/10/2024 2047 by Renea Delaney, RN  Outcome: Progressing     
09/23/2009    IBS (irritable bowel syndrome)     Insomnia     Liver disease     FATTY LIVER    Obesity, unspecified     Osteopenia     started Fosamax therapy 4/2015    PFO (patent foramen ovale)     reportedly dx'ed with CVA in 2012    Prediabetes     Schizophrenia (HCC)     Skin cancer     squamous cell skin cancer - removed    Stroke (HCC) 2012    TIA    Sun-damaged skin      Past Surgical History:   Procedure Laterality Date    ADENOIDECTOMY      CATARACT REMOVAL  06/2020    CHOLECYSTECTOMY, LAPAROSCOPIC  08/04/2022    by Dr. Melendez    COLONOSCOPY N/A 03/28/2022    COLONOSCOPY performed by Scott Barnes MD at [facility] ENDOSCOPY    GI      COLONOSCOPY    HEENT Bilateral     surgery for lazy eye    HYSTERECTOMY (CERVIX STATUS UNKNOWN)  1984    endometriosis    ORTHOPEDIC SURGERY      bone spur removed from right foot    TONSILLECTOMY         Home Situation:  Social/Functional History  Lives With: Spouse  Type of Home: House  Home Layout: Two level, Bed/Bath upstairs  Home Access: Stairs to enter with rails  Entrance Stairs - Number of Steps: 9  Entrance Stairs - Rails: Both  Bathroom Shower/Tub: Walk-in shower  Bathroom Toilet: Standard  Bathroom Equipment: Built-in shower seat  Bathroom Accessibility: Walker accessible  Home Equipment: None  Has the patient had two or more falls in the past year or any fall with injury in the past year?: No  Receives Help From: Family  ADL Assistance: Independent  Homemaking Assistance: Independent  Homemaking Responsibilities: Yes  Meal Prep Responsibility: Secondary  Laundry Responsibility: Secondary  Cleaning Responsibility: Secondary  Bill Paying/Finance Responsibility: Secondary  Shopping Responsibility: Secondary  Health Care Management: Secondary  Ambulation Assistance: Independent  Transfer Assistance: Independent  Active : No  Patient's  Info: Spouse  Mode of Transportation: Van  Education: 3 Years College, Practical Nursing Degree  Occupation: 
Patient;Family  Education Provided: Role of Therapy;Plan of Care;Precautions;ADL Adaptive Strategies;Transfer Training;Fall Prevention Strategies;Equipment  Education Method: Demonstration;Verbal  Barriers to Learning: Cognition  Education Outcome: Verbalized understanding;Continued education needed    Thank you for this referral.  DANIELLE Spears, OTR/L  Minutes: 22    Occupational Therapy Evaluation Charge Determination   History Examination Decision-Making   LOW Complexity : Brief history review  LOW Complexity: 1-3 Performance deficits relating to physical, cognitive, or psychosocial skills that result in activity limitations and/or participation restrictions MEDIUM Complexity: Patient may present with comorbidities that affect occupational performance. Minimal to moderate modifications of tasks or assist (eg. physical or verbal) with assist is necessary to enable pt to complete eval   Based on the above components, the patient evaluation is determined to be of the following complexity level: Low

## 2024-05-13 ENCOUNTER — TELEPHONE (OUTPATIENT)
Age: 74
End: 2024-05-13

## 2024-05-13 LAB
M PNEUMO IGG SER IA-ACNC: 475 U/ML (ref 0–99)
M PNEUMO IGM SER IA-ACNC: <770 U/ML (ref 0–769)

## 2024-05-13 NOTE — TELEPHONE ENCOUNTER
Patient's  called stated wife was release for the hospital on yesterday need a hospital follow up. No available would like for the nurse to give call back with appointment.    Requesting a call back    Best call back #[phone]

## 2024-05-13 NOTE — TELEPHONE ENCOUNTER
Called Patient. Name and  confirmed.  Scheduled [facility] appt. On 24 at 11 AM. Verbalized understanding.

## 2024-05-14 ENCOUNTER — HOME CARE VISIT (OUTPATIENT)
Facility: HOME HEALTH | Age: 74
End: 2024-05-14

## 2024-05-14 LAB
L PNEUMO1 AG UR QL IA: NEGATIVE
SPECIMEN SOURCE: NORMAL

## 2024-05-14 PROCEDURE — G0299 HHS/HOSPICE OF RN EA 15 MIN: HCPCS

## 2024-05-14 PROCEDURE — 0221000100 HH NO PAY CLAIM PROCEDURE

## 2024-05-15 ENCOUNTER — HOME CARE VISIT (OUTPATIENT)
Facility: HOME HEALTH | Age: 74
End: 2024-05-15

## 2024-05-15 VITALS
HEART RATE: 74 BPM | RESPIRATION RATE: 16 BRPM | SYSTOLIC BLOOD PRESSURE: 138 MMHG | DIASTOLIC BLOOD PRESSURE: 70 MMHG | TEMPERATURE: 98 F | OXYGEN SATURATION: 96 %

## 2024-05-15 PROCEDURE — G0151 HHCP-SERV OF PT,EA 15 MIN: HCPCS

## 2024-05-15 ASSESSMENT — ENCOUNTER SYMPTOMS: DYSPNEA ACTIVITY LEVEL: AFTER AMBULATING 10 - 20 FT

## 2024-05-16 ENCOUNTER — HOME CARE VISIT (OUTPATIENT)
Facility: HOME HEALTH | Age: 74
End: 2024-05-16

## 2024-05-16 VITALS
RESPIRATION RATE: 20 BRPM | HEART RATE: 76 BPM | SYSTOLIC BLOOD PRESSURE: 146 MMHG | DIASTOLIC BLOOD PRESSURE: 76 MMHG | OXYGEN SATURATION: 96 % | TEMPERATURE: 97.6 F

## 2024-05-16 VITALS
DIASTOLIC BLOOD PRESSURE: 75 MMHG | HEART RATE: 89 BPM | SYSTOLIC BLOOD PRESSURE: 123 MMHG | TEMPERATURE: 97.5 F | RESPIRATION RATE: 18 BRPM | OXYGEN SATURATION: 99 %

## 2024-05-16 VITALS
RESPIRATION RATE: 16 BRPM | TEMPERATURE: 98 F | SYSTOLIC BLOOD PRESSURE: 136 MMHG | OXYGEN SATURATION: 97 % | HEART RATE: 75 BPM | DIASTOLIC BLOOD PRESSURE: 76 MMHG

## 2024-05-16 PROCEDURE — G0152 HHCP-SERV OF OT,EA 15 MIN: HCPCS

## 2024-05-16 PROCEDURE — G0300 HHS/HOSPICE OF LPN EA 15 MIN: HCPCS

## 2024-05-16 ASSESSMENT — ENCOUNTER SYMPTOMS: STOOL DESCRIPTION: FORMED

## 2024-05-16 NOTE — HOME HEALTH
Subjective: I think I'm better since leaving the hospital, but I'm still weak  Falls since last visit No(if yes complete the Fall Tracking Form and include bsrifallreport):   Caregiver involvement changes: no  Home health supplies by type and quantity ordered/delivered this visit include: no    Clinician asked if patient has had any physician contact since last home care visit and patient states: NO  Clinician asked if patient has any new or changed medications and patient states:  NO   If Yes, were medications reconciled? NO   Was the certifying physician notified of changes in medications? NO     Clinical assessment (what this visit means for the patient overall and need for ongoing skilled care) and progress or lack of progress towards SPECIFIC goals: pt with MSC r/t metabolic issues from meds. Pt with speach impediment since youth and states the delayed speach is worse since her TIA. SN needed for metabolic assess/monitor/teach    Written Teaching Material Utilized: N/A    Interdisciplinary communication with: N/A for the purpose of na    Discharge planning as follows: When goals are met    Specific plan for next visit: metabolic assess/monitor/teach

## 2024-05-16 NOTE — HOME HEALTH
Pt is a 74 y.o. woman who lives with her  and small dog in a 2 level home with 5 JOSELIN. Pt's bedroom and full bathroom are up 13 steps with 1 railing. Pt is temporarily sleeping on the first floor. Pt was recently hospitalized from 5/7 to 5/12 after a fall at home with altered mental status due metabolic encephalopathy from Hypo-osmolality and hyponatremia. She was also found to have bacterial pneumonia while hospitalized. Pt was independent with ambulation, bathing and dressing independently prior to her hospitalization. She has not driven for 2 years. She is currently ambulating with a RW and her 's assistance for safety. Pt is homebound due to BLE weakness, poor endurance, poor balance, SOB with exertion, and gait abnormalities.  Pt is at risk for falls and requires an AD and supervision at all times for safety. It is extremely difficult for this pt to leave her home without max effort and assistance. Pt would benefit from PT treatments 1d1, 2w5 and 3 PRN to improve BLE strength, endurance, gait and balance to improve her function and safety and prevent falls.    PMH includes: Hyperlipidemia, Osteopenia, Obesity, Esophageal Reflux, arthritis, prediabetes, PFO (patent foramen ovale), anxiety, depression, asthma, skin cancer, CVA, TIA, Schizophrenia, Insomnia, IBS (irritable bowel syndrome), DAVILA (nonalcoholic steatohepatitis), Biliary dyskinesia, Chronic renal disease, stage III, History of cholecystectomy, Mild cognitive impairment, Major depressive disorder,  Hyponatremia, HTN, hyperlipidemia  Subjective: Pt reports she is feeling \"good\".  Falls since last visit: no recent falls  Caregiver involvement changes: Albert  Home health supplies by type and quantity ordered/delivered this visit include: none    Clinician asked if patient has had any physician contact since last home care visit and patient states: no  Clinician asked if patient has any new or changed medications and patient states:

## 2024-05-16 NOTE — HOME HEALTH
Reason for referral, [facility] SUMMARY of clinical condition: 74 Yanet Posada admitted to home care for hypo-osmalility with hyponatremia. Nursing needed for education.     Clinical Assessment/Skilled reason for admission to home health (What this means for the patient overall and need for ongoing skilled care): Patient is 73 yo female with recent UTI on admission to hospital and hypo-osmalility and hypo natremia  and hx of fall living in two story home with  as caregivers most of the time.   Medication education done this visit includes halolodol, aspirin.  Education given to patient/caregiver on fall prevention, UTI prevention.   Patient response to visit includes understanding of need.   Needs continued SN to prevent falls, electrolyte imbalance and rehospitalization due to illness/diagnosis.   Needs continued SN for education.         Functional Mobility:  Bed Mobility (rolling/scooting): Maximum Assistance (requires assistance with more than 50% of the effort)  Transfers (supine to chair and return to supine): Maximum Assistance (requires assistance with more than 50% of the effort).  Patient uses device: yes  Gait:  Ambulates with supervision using a front wheeled walker  Safety concerns with mobility include: unsteady gait, impaired balance , recent falls, debility and cluttered living area, environmental modifications needed for safety   DME: walker    Diagnosis: hypo-osmolality and hyponatremia    Subjective (statement from pt/cg that is relative to why you are there): patient states she was hospitalized after a fall and feeling faint    Caregiver: spouse.  Caregiver assists with: Medications, Meals, Bathing, IADL, Transportation and Housekeeping Caregiver unable to assist with: n/a. Caregiver is available Regularly Caregiver is  present at this visit and did participate with clinician.    Medications reconciled and all medications are available in the home this visit. The following education was provided

## 2024-05-16 NOTE — HOME HEALTH
Reason for referral, [facility] SUMMARY of clinical condition: Mrs. Posada admitted to home care for skilled HH OT needed for assessment and intervention of home safety and implementation of home rehab plan along with ability to perform ADLs and instruction on adaptive techniques.  Patient lives with her  in two level home with full bathroom and bedroom on second level. She using a RW for mobility requiring mod A for bathing and LB dressing tasks, min A for toileting tasks, min A for balance when retrieving needed clothing items in order to complete UB dressing tasks, min A for toilet transfers with recommendation to obtain BSC for use of frame over commodes due to low height and min-mod A for shower transfers sitting on build in corner seat with  reporting they plan to purchase suction grab bars. She reports performing some light meal prep PTA as  does most of the cooking but was managing laundry tasks.   The Barthel index assessment administered and score at 45/100 indicating partially dependent with self care tasks. MACH-10 assessment score was 8/10 indicating patient is at risk for falls as instruction was provided on fall prevention during visit. Patient would benefit from skilled HH OT to increase independence and reduce risk of falls with with ADLs, light IADLs and functional transfers along with strength, endurance, balance deficits, DME/AE training and fall prevention training to return to highest level of independent functioning. Patient to benefit from skilled OT services for 1d1 and 2w4 to address the above deficits with patient verbalizing understanding and in agreement with POC.     Subjective (statement from pt/cg that is relative to why you are there): \"I would like to get to back to what I was doing before.\"  Caregiver: .  Caregiver assists with: ADLs, IADLs, picking up needed items, transportation. Caregiver unable to assist with: n/a. Caregiver is available: when requested.

## 2024-05-20 ENCOUNTER — HOME CARE VISIT (OUTPATIENT)
Facility: HOME HEALTH | Age: 74
End: 2024-05-20
Payer: MEDICARE

## 2024-05-20 ENCOUNTER — HOME CARE VISIT (OUTPATIENT)
Facility: HOME HEALTH | Age: 74
End: 2024-05-20

## 2024-05-20 VITALS
DIASTOLIC BLOOD PRESSURE: 78 MMHG | OXYGEN SATURATION: 98 % | RESPIRATION RATE: 20 BRPM | SYSTOLIC BLOOD PRESSURE: 102 MMHG | HEART RATE: 66 BPM | TEMPERATURE: 97.6 F

## 2024-05-20 VITALS
TEMPERATURE: 98.3 F | SYSTOLIC BLOOD PRESSURE: 138 MMHG | HEART RATE: 82 BPM | DIASTOLIC BLOOD PRESSURE: 70 MMHG | OXYGEN SATURATION: 96 % | RESPIRATION RATE: 16 BRPM

## 2024-05-20 PROCEDURE — G0152 HHCP-SERV OF OT,EA 15 MIN: HCPCS

## 2024-05-20 PROCEDURE — G0151 HHCP-SERV OF PT,EA 15 MIN: HCPCS

## 2024-05-20 PROCEDURE — G0300 HHS/HOSPICE OF LPN EA 15 MIN: HCPCS

## 2024-05-20 NOTE — HOME HEALTH
Subjective: Pt reports she felt \"queasy\" last night. Pt reports she thinks she ate too much, she ate sloppy joes. Pt reports no queasy feeling this morning. Pt is not using her RW and states \"I don't need it\" and she has not been using it for a few days. PT re-educated pt to use her RW if she is not feeling wel, like last night, or when she is tired or her legs feel weak/wobbly. KAREN Watkins arrived at the end of today's PT treatment.  Falls since last visit: no recent falls   Caregiver involvement changes: , Albert   Home health supplies by type and quantity ordered/delivered this visit include: none   Clinician asked if patient has had any physician contact since last home care visit and patient states: no   Clinician asked if patient has any new or changed medications and patient states: no   If Yes, were medications reconciled? no  Was the certifying physician notified of changes in medications? n/a   Clinical assessment (what this visit means for the patient overall and need for ongoing skilled care) and progress or lack of progress towards SPECIFIC goals: Pt was able to perform additional ther ex during today's treatment, indicating pt is making slow but steady progress toward her strength goal. Pt understands antibiotic and antiplatelet drug education and she has achieved these goals. Pt was able to progress balance ex using the blue foam pad however she cont to present with somatosensory and vestibular hypofunction indicating pt is at risk for falls, making slow but steady progress toward her balance goal. Pt used improved technique with transfers after cues with less assistance, making steady progress toward her transfer goal. Pt was able to amb farther for gait training however she cont to present with gait deficits indicating pt is at risk for falls, making slow but steady progress toward her gait goal. Pt cont benefit from PT treatments to improve  BLE strength, transfers, gait and balance to improve

## 2024-05-21 ENCOUNTER — OFFICE VISIT (OUTPATIENT)
Age: 74
End: 2024-05-21

## 2024-05-21 VITALS
SYSTOLIC BLOOD PRESSURE: 144 MMHG | RESPIRATION RATE: 16 BRPM | OXYGEN SATURATION: 100 % | BODY MASS INDEX: 32.24 KG/M2 | DIASTOLIC BLOOD PRESSURE: 76 MMHG | TEMPERATURE: 97.5 F | HEIGHT: 62 IN | WEIGHT: 175.2 LBS | HEART RATE: 62 BPM

## 2024-05-21 VITALS
RESPIRATION RATE: 18 BRPM | DIASTOLIC BLOOD PRESSURE: 70 MMHG | OXYGEN SATURATION: 96 % | SYSTOLIC BLOOD PRESSURE: 138 MMHG | TEMPERATURE: 98.3 F | HEART RATE: 82 BPM

## 2024-05-21 DIAGNOSIS — E87.1 HYPONATREMIA: ICD-10-CM

## 2024-05-21 DIAGNOSIS — J18.9 COMMUNITY ACQUIRED PNEUMONIA, UNSPECIFIED LATERALITY: ICD-10-CM

## 2024-05-21 DIAGNOSIS — Z09 HOSPITAL DISCHARGE FOLLOW-UP: Primary | ICD-10-CM

## 2024-05-21 DIAGNOSIS — I10 PRIMARY HYPERTENSION: ICD-10-CM

## 2024-05-21 LAB
ANION GAP SERPL CALC-SCNC: 2 MMOL/L (ref 5–15)
BASOPHILS # BLD: 0.1 K/UL (ref 0–0.1)
BASOPHILS NFR BLD: 1 % (ref 0–1)
BUN SERPL-MCNC: 18 MG/DL (ref 6–20)
BUN/CREAT SERPL: 19 (ref 12–20)
CALCIUM SERPL-MCNC: 9.4 MG/DL (ref 8.5–10.1)
CHLORIDE SERPL-SCNC: 108 MMOL/L (ref 97–108)
CO2 SERPL-SCNC: 30 MMOL/L (ref 21–32)
CREAT SERPL-MCNC: 0.97 MG/DL (ref 0.55–1.02)
DIFFERENTIAL METHOD BLD: ABNORMAL
EOSINOPHIL # BLD: 0.1 K/UL (ref 0–0.4)
EOSINOPHIL NFR BLD: 2 % (ref 0–7)
ERYTHROCYTE [DISTWIDTH] IN BLOOD BY AUTOMATED COUNT: 13.2 % (ref 11.5–14.5)
GLUCOSE SERPL-MCNC: 89 MG/DL (ref 65–100)
HCT VFR BLD AUTO: 40.5 % (ref 35–47)
HGB BLD-MCNC: 12.8 G/DL (ref 11.5–16)
IMM GRANULOCYTES # BLD AUTO: 0.2 K/UL (ref 0–0.04)
IMM GRANULOCYTES NFR BLD AUTO: 2 % (ref 0–0.5)
LYMPHOCYTES # BLD: 1.9 K/UL (ref 0.8–3.5)
LYMPHOCYTES NFR BLD: 20 % (ref 12–49)
MCH RBC QN AUTO: 29.4 PG (ref 26–34)
MCHC RBC AUTO-ENTMCNC: 31.6 G/DL (ref 30–36.5)
MCV RBC AUTO: 92.9 FL (ref 80–99)
MONOCYTES # BLD: 0.7 K/UL (ref 0–1)
MONOCYTES NFR BLD: 7 % (ref 5–13)
NEUTS SEG # BLD: 6.5 K/UL (ref 1.8–8)
NEUTS SEG NFR BLD: 68 % (ref 32–75)
NRBC # BLD: 0 K/UL (ref 0–0.01)
NRBC BLD-RTO: 0 PER 100 WBC
PLATELET # BLD AUTO: 346 K/UL (ref 150–400)
PMV BLD AUTO: 9.5 FL (ref 8.9–12.9)
POTASSIUM SERPL-SCNC: 4.7 MMOL/L (ref 3.5–5.1)
RBC # BLD AUTO: 4.36 M/UL (ref 3.8–5.2)
SODIUM SERPL-SCNC: 140 MMOL/L (ref 136–145)
WBC # BLD AUTO: 9.4 K/UL (ref 3.6–11)

## 2024-05-21 ASSESSMENT — ENCOUNTER SYMPTOMS
WHEEZING: 0
SHORTNESS OF BREATH: 0
VOMITING: 0
CONSTIPATION: 0
COUGH: 0
NAUSEA: 0
DIARRHEA: 0
ABDOMINAL PAIN: 0
CHEST TIGHTNESS: 0

## 2024-05-21 NOTE — HOME HEALTH
Subjective: Pt stated: \" I got new grab bars foe the bathroom.\"    Falls since last visit No (if yes complete the Fall Tracking Form and include bsrifallreport):     Caregiver involvement changes: no    Home health supplies by type and quantity ordered/delivered this visit include: no     Clinician asked if patient has had any physician contact since last home care visit and patient states: NO    Clinician asked if patient has any new or changed medications and patient states:  NO     If Yes, were medications reconciled? NO     Was the certifying physician notified of changes in medications? NO      Clinical assessment (what this visit means for the patient overall and need for ongoing skilled care) and progress or lack of progress towards SPECIFIC goals: Today continue to instruct in safety and grab placement for bathroom safety. Made marks for placement for grab bars and pt's  will install. Pt was able to transfer in and out of shower with SBA today. Pt is dressing herself with setup-SBA. Today instructed in home safety with access refrigerator for refilling her H20. Continue to work on balance and safety due to pt previously not using AD and was iND in all aspects of daily living. Discussed laundry setup due to needing to get clothing from upstairs downstairs. Suggested using pillow case to get clothing tossed down steps. Pt's  is currently doing laundry and meal. Continue to instruct in higher level balance activities for improved safety and decreased fall risk. Continue to teach and train pt/cg on fall risk prevention strategies, including using reacher instead of bending to the floor, ensure ADL DME/equipment is maintained correctly; wear non slip footwear, keep environment well lit, monitor medication that may alter mental status, slow down when turning, maintain wide base of support when turning and remove clutter. Pt was able to transfer in and out of shower with SBA and VC'ing for safety. Pt

## 2024-05-21 NOTE — PROGRESS NOTES
Post-Discharge Transitional Care  Follow Up      Yanet Posada   YOB: 1950    Date of Office Visit:  5/21/2024  Date of Hospital Admission: 5/7/24  Date of Hospital Discharge: 5/12/24  Risk of hospital readmission (high >=14%. Medium >=10%) :Readmission Risk Score: 8.6      Care management risk score Rising risk (score 2-5) and Complex Care (Scores >=6): No Risk Score On File     Non face to face  following discharge, date last encounter closed (first attempt may have been earlier): 05/13/2024    Call initiated 2 business days of discharge: Yes    ASSESSMENT/PLAN:   Hospital discharge follow-up  -     IN DISCHARGE MEDS RECONCILED W/ CURRENT OUTPATIENT MED LIST  Hyponatremia  Off of HCTZ and SSRI. Will recheck.  -     Basic Metabolic Panel; Future  Community acquired pneumonia, unspecified laterality  Clinically improved s/p abx  -     CBC with Auto Differential; Future  Primary hypertension  Continue to monitor BP at home; if elevated, will start low dose BP such as amlodipine or losartan.      Medical Decision Making: moderate complexity  Return in about 3 months (around 8/21/2024) for HTN.           Subjective:   HPI:  Follow up of Hospital problems/diagnosis(es): Bacterial pneumonia, hypotonic hyponatremia, AMS    Inpatient course: Discharge summary reviewed- see chart.    Interval history/Current status:     Currently doing well.  Mood wise has been stable since stopping the Prozac.  Blood pressure readings through home health have been within normal limits since stopping HCTZ.      Patient Active Problem List   Diagnosis    Hyperlipidemia    Osteopenia    Obesity, unspecified    Esophageal reflux    Arthritis    Prediabetes    PFO (patent foramen ovale)    Anxiety    Depression    Asthma    Skin cancer    History of CVA (cerebrovascular accident)    Schizophrenia (HCC)    Insomnia    IBS (irritable bowel syndrome)    Hypertension    DAVILA (nonalcoholic steatohepatitis)    Biliary dyskinesia

## 2024-05-21 NOTE — PROGRESS NOTES
Chief Complaint   Patient presents with    Follow-Up from Hospital     \"Have you been to the ER, urgent care clinic since your last visit?  Hospitalized since your last visit?\"    Yes. 5/7/24- [facility]- mental status    “Have you seen or consulted any other health care providers outside of [facility] since your last visit?”    NO                Financial Resource Strain: Low Risk  (7/3/2023)    Overall Financial Resource Strain (CARDIA)     Difficulty of Paying Living Expenses: Not hard at all      Food Insecurity: No Food Insecurity (5/7/2024)    Hunger Vital Sign     Worried About Running Out of Food in the Last Year: Never true     Ran Out of Food in the Last Year: Never true            1/5/2024    12:40 PM   PHQ-9    Little interest or pleasure in doing things 1   Feeling down, depressed, or hopeless 1   Trouble falling or staying asleep, or sleeping too much 0   Feeling tired or having little energy 2   Poor appetite or overeating 0   Feeling bad about yourself - or that you are a failure or have let yourself or your family down 0   Trouble concentrating on things, such as reading the newspaper or watching television 0   Moving or speaking so slowly that other people could have noticed. Or the opposite - being so fidgety or restless that you have been moving around a lot more than usual 0   Thoughts that you would be better off dead, or of hurting yourself in some way 0   PHQ-2 Score 2   PHQ-9 Total Score 4   If you checked off any problems, how difficult have these problems made it for you to do your work, take care of things at home, or get along with other people? 0       Health Maintenance Due   Topic Date Due    Respiratory Syncytial Virus (RSV) Pregnant or age 60 yrs+ (1 - 1-dose 60+ series) Never done    Hepatitis B vaccine (3 of 3 - 19+ 3-dose series) 03/09/2016    Pneumococcal 65+ years Vaccine (2 of 2 - PCV) 07/22/2017    COVID-19 Vaccine (6 - 2023-24 season) 12/27/2023

## 2024-05-22 ENCOUNTER — HOME CARE VISIT (OUTPATIENT)
Facility: HOME HEALTH | Age: 74
End: 2024-05-22

## 2024-05-22 VITALS
RESPIRATION RATE: 16 BRPM | OXYGEN SATURATION: 96 % | SYSTOLIC BLOOD PRESSURE: 122 MMHG | DIASTOLIC BLOOD PRESSURE: 70 MMHG | TEMPERATURE: 98 F | HEART RATE: 63 BPM

## 2024-05-22 PROCEDURE — G0151 HHCP-SERV OF PT,EA 15 MIN: HCPCS

## 2024-05-22 NOTE — HOME HEALTH
Subjective: Pt went to Dr. Weiss yesterday. Her sodium and potassium levels are back to normal and pt can now slowly increase the amount of water she is drinking. Pt reports she has not needed to use her RW and she has felt \"fine\" when walking inside her home.   Falls since last visit: no recent falls   Caregiver involvement changes: , Albetr   Home health supplies by type and quantity ordered/delivered this visit include: none   Clinician asked if patient has had any physician contact since last home care visit and patient states: yes   Clinician asked if patient has any new or changed medications and patient states: no   If Yes, were medications reconciled? no   Was the certifying physician notified of changes in medications? n/a   Clinical assessment (what this visit means for the patient overall and need for ongoing skilled care) and progress or lack of progress towards SPECIFIC goals: Pt was able to perform all ther ex during today's treatment, indicating pt is making steady progress toward her strength goal. Pt was able to progress balance ex using the blue foam pad however she cont to present with somatosensory and vestibular hypofunction indicating pt is at risk for falls, making slow but steady progress toward her balance goal. Pt used improved technique with transfers after cues with less assistance, making steady progress toward her transfer goal. Pt was able to amb farther for gait training however she cont to present with gait deficits indicating pt is at risk for falls, making slow but steady progress toward her gait goal. Pt cont benefit from PT treatments to improve BLE strength, transfers, gait and balance to improve her function and safety and prevent falls.   Written Teaching Material Utilized: home care handbook, phone numbers, HEP, stoplight tool, fall preventio strategies, updated HEP   Interdisciplinary communication with: none  Discharge planning as follows: d/c to home with

## 2024-05-23 ENCOUNTER — HOME CARE VISIT (OUTPATIENT)
Facility: HOME HEALTH | Age: 74
End: 2024-05-23

## 2024-05-23 PROCEDURE — G0299 HHS/HOSPICE OF RN EA 15 MIN: HCPCS

## 2024-05-23 PROCEDURE — G0152 HHCP-SERV OF OT,EA 15 MIN: HCPCS

## 2024-05-23 ASSESSMENT — ENCOUNTER SYMPTOMS: STOOL DESCRIPTION: FORMED

## 2024-05-24 VITALS
HEART RATE: 70 BPM | TEMPERATURE: 97.8 F | OXYGEN SATURATION: 97 % | DIASTOLIC BLOOD PRESSURE: 78 MMHG | RESPIRATION RATE: 18 BRPM | SYSTOLIC BLOOD PRESSURE: 130 MMHG

## 2024-05-24 ASSESSMENT — ENCOUNTER SYMPTOMS: PAIN LOCATION - PAIN QUALITY: ACHE

## 2024-05-24 NOTE — HOME HEALTH
Subjective: Pt stated: \" My grab bar was installed in the bathroom.\"    Falls since last visit No (if yes complete the Fall Tracking Form and include bsrifallreport):      Caregiver involvement changes: no     Home health supplies by type and quantity ordered/delivered this visit include: no      Clinician asked if patient has had any physician contact since last home care visit and patient states: NO     Clinician asked if patient has any new or changed medications and patient states:  NO      If Yes, were medications reconciled? NO      Was the certifying physician notified of changes in medications? NO      Clinical assessment (what this visit means for the patient overall and need for ongoing skilled care) and progress or lack of progress towards SPECIFIC goals: Today continue to instruct in ADL safety. Pt able to transfer on and off commode with new grab bar with modified iND. Pt is able to complete shower task with supervision and setup. Pt is dressing herself with modified iND. Continue to work on balance and safety due to pt previously not using AD and was iND in all aspects of daily living. Continue to instruct in higher level balance activities for improved safety and decreased fall risk. Continue to teach and train pt/cg on fall risk prevention strategies, including using reacher instead of bending to the floor, ensure ADL DME/equipment is maintained correctly; wear non slip footwear, keep environment well lit, monitor medication that may alter mental status, slow down when turning, maintain wide base of support when turning and remove clutter. Issued theraputty hand exercises due to hand weakness/arthritis and difficulty with opening and fastening items. Pt progressing with OT goals but not yet met. see interventions for details.    Written Teaching Material Utilized: NA    Interdisciplinary communication with: N/A for the purpose of na     Discharge planning as follows: When goals are met     Specific

## 2024-05-25 VITALS
SYSTOLIC BLOOD PRESSURE: 122 MMHG | HEART RATE: 67 BPM | TEMPERATURE: 97.8 F | OXYGEN SATURATION: 98 % | RESPIRATION RATE: 18 BRPM | DIASTOLIC BLOOD PRESSURE: 74 MMHG

## 2024-05-27 ENCOUNTER — HOME CARE VISIT (OUTPATIENT)
Facility: HOME HEALTH | Age: 74
End: 2024-05-27
Payer: MEDICARE

## 2024-05-27 VITALS
OXYGEN SATURATION: 96 % | TEMPERATURE: 98.4 F | RESPIRATION RATE: 16 BRPM | SYSTOLIC BLOOD PRESSURE: 118 MMHG | HEART RATE: 67 BPM | DIASTOLIC BLOOD PRESSURE: 62 MMHG

## 2024-05-27 PROCEDURE — G0151 HHCP-SERV OF PT,EA 15 MIN: HCPCS

## 2024-05-27 NOTE — HOME HEALTH
planning.   Specific plan for next visit: cont with BLE strengthening, gait training, stair training, add more standing ther ex and balance ex as pt symptoms tolerate

## 2024-05-29 ENCOUNTER — HOME CARE VISIT (OUTPATIENT)
Facility: HOME HEALTH | Age: 74
End: 2024-05-29
Payer: MEDICARE

## 2024-05-29 VITALS
TEMPERATURE: 98.4 F | RESPIRATION RATE: 16 BRPM | HEART RATE: 65 BPM | OXYGEN SATURATION: 97 % | SYSTOLIC BLOOD PRESSURE: 122 MMHG | DIASTOLIC BLOOD PRESSURE: 70 MMHG

## 2024-05-29 PROCEDURE — G0151 HHCP-SERV OF PT,EA 15 MIN: HCPCS

## 2024-05-29 PROCEDURE — G0152 HHCP-SERV OF OT,EA 15 MIN: HCPCS

## 2024-05-29 NOTE — HOME HEALTH
Subjective: Pt states she is \"feeling good\". Pt has no new complaints. PT educated pt and  to ensure she is drinking drinks with electrolytes, like gatorade, to help prevent her sodium and potassium levels from dipping too low. Pt's  reports the MD also told them that and pt has been drinking gatorade regularly.  Falls since last visit: no recent falls   Caregiver involvement changes: , Albert   Home health supplies by type and quantity ordered/delivered this visit include: none   Clinician asked if patient has had any physician contact since last home care visit and patient states: no   Clinician asked if patient has any new or changed medications and patient states: no   If Yes, were medications reconciled? no   Was the certifying physician notified of changes in medications? n/a   Clinical assessment (what this visit means for the patient overall and need for ongoing skilled care) and progress or lack of progress towards SPECIFIC goals: Pt was able to perform additional reps of ther ex during today's treatment, indicating pt is making steady progress toward her strength goal. Pt was able to progress balance ex using the blue foam pad however she cont to present with somatosensory and vestibular hypofunction indicating pt is at risk for falls, making slow but steady progress toward her balance goal. Pt understands fall prevention strategies and she has achieved this goal. Pt was able to amb farther outside for gait training however she cont to present with gait deficits indicating pt is at risk for falls, making slow but steady progress toward her gait goal. Pt cont benefit from PT treatments to improve BLE strength, transfers, gait and balance to improve her function and safety and prevent falls.   Written Teaching Material Utilized: home care handbook, phone numbers, HEP, stoplight tool, fall preventio strategies, updated HEP   Interdisciplinary communication with: none   Discharge planning as

## 2024-05-30 ENCOUNTER — HOME CARE VISIT (OUTPATIENT)
Facility: HOME HEALTH | Age: 74
End: 2024-05-30
Payer: MEDICARE

## 2024-05-30 VITALS
HEART RATE: 62 BPM | SYSTOLIC BLOOD PRESSURE: 128 MMHG | OXYGEN SATURATION: 98 % | TEMPERATURE: 97.2 F | RESPIRATION RATE: 18 BRPM | DIASTOLIC BLOOD PRESSURE: 72 MMHG

## 2024-05-30 VITALS
TEMPERATURE: 98 F | SYSTOLIC BLOOD PRESSURE: 131 MMHG | OXYGEN SATURATION: 98 % | HEART RATE: 79 BPM | DIASTOLIC BLOOD PRESSURE: 77 MMHG | RESPIRATION RATE: 16 BRPM

## 2024-05-30 PROCEDURE — G0152 HHCP-SERV OF OT,EA 15 MIN: HCPCS

## 2024-05-30 NOTE — HOME HEALTH
Subjective: \"I'm think I'm doing much better.\"   Falls since last visit: (if yes complete the Fall Tracking Form and include bsrifallreport): No   Caregiver involvement changes: none   Home health supplies by type and quantity ordered/delivered this visit include: None     Clinician asked if patient has had any physician contact since last home care visit and patient states: No   Clinician asked if patient has any new or changed medications and patient states: No  If Yes, were medications reconciled? N/A   Was the certifying physician notified of changes in medications? N/A     Clinical assessment (what this visit means for the patient overall and need for ongoing skilled care) and progress or lack of progress towards SPECIFIC goals: Patient has made good steady progress during  OT services the following functional gains going from mod A for bathing and LB dressing tasks to mod I level, min A for toileting tasks to mod I level, min A for balance when retrieving needed clothing items in order to complete UB dressing tasks to mod I level, min A for toilet transfers to mod I level with modifications of grab bar installation and min-mod A for shower transfers sitting on build in corner seat to mod I level along with completing some simple meal prep and laundry tasks with mod I level without use of an assistive device.   The Barthel index assessment score at initial evaluation was 45/100 indicating minimally dependent improving to 100/100 indicating total independence with self care tasks. MACH-10 assessment score was 8/10 at initial evaluation improving to 5/10 continuing to indicate patient is at risk for falls as instruction on fall prevention was provided throughout episode of care. Patient has met all goals set for ADLs, IADLs and functional transfers implementing fall prevention techniques along with completing UE HEP with independence.     Written Teaching Material Utilized: None   Interdisciplinary

## 2024-05-30 NOTE — HOME HEALTH
Subjective: Pt stated: \" I did my shower all by myself.\"    Falls since last visit No (if yes complete the Fall Tracking Form and include bsrifallreport):       Caregiver involvement changes: no      Home health supplies by type and quantity ordered/delivered this visit include: no       Clinician asked if patient has had any physician contact since last home care visit and patient states: NO      Clinician asked if patient has any new or changed medications and patient states:  NO       If Yes, were medications reconciled? NO       Was the certifying physician notified of changes in medications? NO       Clinical assessment (what this visit means for the patient overall and need for ongoing skilled care) and progress or lack of progress towards SPECIFIC goals: Today continue to instruct in ADL safety. Pt is iND with shower and dressing. Today upgrade HEP and continued to work on higher level balance activities and safety due to pt previously not using AD and was iND in all aspects of daily living. Continue to instruct in higher level balance activities for improved safety and decreased fall risk. Continue to teach and train pt/cg on fall risk prevention strategies, including using reacher instead of bending to the floor, ensure ADL DME/equipment is maintained correctly; wear non slip footwear, keep environment well lit, monitor medication that may alter mental status, slow down when turning, maintain wide base of support when turning and remove clutter. Pt has continued theraputty hand exercises due to hand weakness/arthritis and difficulty with opening and fastening items and progressed with HEP. Pt able to stand today x 5 min x 3 with dynamic standing task in prep for improved strengthening for meal prep and laundry task. Pt progressing with OT and goals are met. Pt has met OT tx goal and Barthel Index has improved to 90/100. see interventions for details.    Written Teaching Material Utilized:

## 2024-06-03 ENCOUNTER — HOME CARE VISIT (OUTPATIENT)
Facility: HOME HEALTH | Age: 74
End: 2024-06-03
Payer: MEDICARE

## 2024-06-03 VITALS
OXYGEN SATURATION: 97 % | RESPIRATION RATE: 16 BRPM | TEMPERATURE: 98.4 F | SYSTOLIC BLOOD PRESSURE: 122 MMHG | DIASTOLIC BLOOD PRESSURE: 62 MMHG | HEART RATE: 72 BPM

## 2024-06-03 PROCEDURE — G0151 HHCP-SERV OF PT,EA 15 MIN: HCPCS

## 2024-06-03 NOTE — HOME HEALTH
Subjective: Pt states she is \"feeling good\". Pt has no new complaints.   Falls since last visit: no recent falls   Caregiver involvement changes: , Albert   Home health supplies by type and quantity ordered/delivered this visit include: none   Clinician asked if patient has had any physician contact since last home care visit and patient states: no   Clinician asked if patient has any new or changed medications and patient states: no   If Yes, were medications reconciled? no   Was the certifying physician notified of changes in medications? n/a   Clinical assessment (what this visit means for the patient overall and need for ongoing skilled care) and progress or lack of progress towards SPECIFIC goals: Pt was able to perform all reps of ther ex during today's treatment, indicating pt is making steady progress toward her strength goal. Pt was able to progress balance ex using the blue foam pad however she cont to present with somatosensory and vestibular hypofunction indicating pt is at risk for falls, making steady progress toward her balance goal. Pt was able to amb farther outside for gait training however she cont to present with gait deficits indicating pt is at risk for falls, making steady progress toward her gait goal. Pt cont benefit from PT treatments to improve BLE strength, transfers, gait and balance to improve her function and safety and prevent falls.   Written Teaching Material Utilized: home care handbook, phone numbers, HEP, stoplight tool, fall preventio strategies, updated HEP, NOMNC   Interdisciplinary communication with: none   Discharge planning as follows: d/c to home with assistance when goals are met, Is no longer homebound, Per physician order and When patient is no longer able to participate or progresses to SNF/Hospice Patient/caregiver did verbalize agreement with discharge planning.   Specific plan for next visit: plan to discharge next treatment, cont with BLE strengthening, gait

## 2024-06-05 ENCOUNTER — HOME CARE VISIT (OUTPATIENT)
Facility: HOME HEALTH | Age: 74
End: 2024-06-05
Payer: MEDICARE

## 2024-06-05 VITALS
RESPIRATION RATE: 16 BRPM | DIASTOLIC BLOOD PRESSURE: 62 MMHG | HEART RATE: 62 BPM | OXYGEN SATURATION: 98 % | SYSTOLIC BLOOD PRESSURE: 122 MMHG | TEMPERATURE: 98.4 F

## 2024-06-05 PROCEDURE — G0151 HHCP-SERV OF PT,EA 15 MIN: HCPCS

## 2024-06-05 NOTE — HOME HEALTH
Subjective: Pt states she is \"feeling good\". Pt has no new complaints and she reports performing her HEP.   Falls since last visit: no recent falls   Caregiver involvement changes: , Albert   Home health supplies by type and quantity ordered/delivered this visit include: none   Clinician asked if patient has had any physician contact since last home care visit and patient states: no   Clinician asked if patient has any new or changed medications and patient states: no   If Yes, were medications reconciled? yes   Was the certifying physician notified of changes in medications? yes, pt is taking Xyzal instead of Claritin. PT notified MD  Clinical assessment (what this visit means for the patient overall and need for ongoing skilled care) and progress or lack of progress towards SPECIFIC goals: Pt was able to perform all reps of ther ex during today's treatment, achieving her strength goal. Pt was able to perform addtional balance ex achieving her balance goal. Pt was able to amb farther outside for gait training with improved technique, achieving her gait goal. Pt cont to be at risk for falls however this risk is reducing as evidenced by the following:  MAHC 10-6, this is improved from 8 at PT eval however pt is still at risk for falls   Tinetti -27/28, this is improved from 12/28 at PT eval and pt is low risk for falls   30 sec sit to stand test, 8 transfers without UEs, this is improved from 4 transfers with use of BUEs at PT eval, however pt is still at risk for falls   Pt has achieved all PT goals and is to be discharged to her HEP.  Written Teaching Material Utilized: home care handbook, phone numbers, HEP, stoplight tool, fall preventio strategies, updated HEP, NOMNC   Interdisciplinary communication with: MD regarding pt discharge and medication update  Discharge planning as follows: d/c to home with assistance as goals are met

## 2024-06-05 NOTE — CASE COMMUNICATION
Yaent Posada has achieved all home care physical therapy goals and she was discharged from home PT treatments today. Her medications were reconciled and pt is now taking Xyzal instead of Claritin. Her med list was updated.  Thanks,  Zenia Viera, PT  Home Care Physical Therapist  [facility]  [phone] (cell)  [phone] (office)

## 2024-06-17 ENCOUNTER — OFFICE VISIT (OUTPATIENT)
Age: 74
End: 2024-06-17
Payer: MEDICARE

## 2024-06-17 DIAGNOSIS — F33.1 MAJOR DEPRESSIVE DISORDER, RECURRENT EPISODE, MODERATE WITH ANXIOUS DISTRESS (HCC): ICD-10-CM

## 2024-06-17 DIAGNOSIS — G31.84 MILD COGNITIVE IMPAIRMENT: Primary | ICD-10-CM

## 2024-06-17 PROCEDURE — 1036F TOBACCO NON-USER: CPT | Performed by: CLINICAL NEUROPSYCHOLOGIST

## 2024-06-17 PROCEDURE — 90791 PSYCH DIAGNOSTIC EVALUATION: CPT | Performed by: CLINICAL NEUROPSYCHOLOGIST

## 2024-06-17 PROCEDURE — 1123F ACP DISCUSS/DSCN MKR DOCD: CPT | Performed by: CLINICAL NEUROPSYCHOLOGIST

## 2024-06-17 NOTE — PROGRESS NOTES
Intake Note      Patient Name: Yanet Posada  YOB: 1950    Age: 74 y.o.  Date of Intake: 6/17/2024   Education: 15 Ethnicity White   Gender: Female Referring Provider: Dr.. Weiss     REASON FOR REFERRAL AND EVALUATION PROCEDURES:  Yanet Posada  was referred for evaluation by her Primary Care Physician to assist in differential diagnosis and individualized treatment planning. she understood the rationale and procedures for evaluation, as well as the limits to confidentiality, and agreed to participate. she consented to have this report made available to her  treating providers through her  electronic medical records.   History Sources: Patient, Spouse, and Medical Record    HISTORY OF PRESENT ILLNESS:  The patient is a 74-year-old female with pertinent medical history noted for hyperlipidemia, prediabetes, anxiety, depression, CVA, schizophrenia, insomnia, hypertension, Ctoe, mild cognitive impairment, major depressive disorder, and hyponatremia.  She presented for serial neuropsychological evaluation following workup in our office in June of last year.  From my previous note:    The patient presented for clinical interview accompanied by her .  While she appeared capable of contributing to her history, her  provided substantive details.  According to the patient's , the patient sustained a ground-level fall in her backyard approximately 3 years ago.  This fall was reportedly associated with momentary loss of consciousness but no PTA or other physical sequela.  The patient's  indicated that after this fall, the patient experienced gradually progressive declines in her cognitive functioning that have advanced at a more rapid pace, particularly over the past 6 to 12 months.  The patient and her  indicated the earliest change they noticed in their primary concern relates to expressive language problems.  They stated the patient appears to have significant

## 2024-08-19 SDOH — ECONOMIC STABILITY: FOOD INSECURITY: WITHIN THE PAST 12 MONTHS, THE FOOD YOU BOUGHT JUST DIDN'T LAST AND YOU DIDN'T HAVE MONEY TO GET MORE.: PATIENT DECLINED

## 2024-08-19 SDOH — ECONOMIC STABILITY: INCOME INSECURITY: HOW HARD IS IT FOR YOU TO PAY FOR THE VERY BASICS LIKE FOOD, HOUSING, MEDICAL CARE, AND HEATING?: PATIENT DECLINED

## 2024-08-19 SDOH — ECONOMIC STABILITY: FOOD INSECURITY: WITHIN THE PAST 12 MONTHS, YOU WORRIED THAT YOUR FOOD WOULD RUN OUT BEFORE YOU GOT MONEY TO BUY MORE.: PATIENT DECLINED

## 2024-08-19 SDOH — ECONOMIC STABILITY: TRANSPORTATION INSECURITY
IN THE PAST 12 MONTHS, HAS LACK OF TRANSPORTATION KEPT YOU FROM MEETINGS, WORK, OR FROM GETTING THINGS NEEDED FOR DAILY LIVING?: PATIENT DECLINED

## 2024-08-22 ENCOUNTER — OFFICE VISIT (OUTPATIENT)
Age: 74
End: 2024-08-22
Payer: MEDICARE

## 2024-08-22 VITALS
SYSTOLIC BLOOD PRESSURE: 128 MMHG | TEMPERATURE: 97.7 F | OXYGEN SATURATION: 99 % | DIASTOLIC BLOOD PRESSURE: 72 MMHG | WEIGHT: 180.2 LBS | BODY MASS INDEX: 33.16 KG/M2 | HEART RATE: 69 BPM | HEIGHT: 62 IN | RESPIRATION RATE: 16 BRPM

## 2024-08-22 DIAGNOSIS — F20.9 SCHIZOPHRENIA, UNSPECIFIED TYPE (HCC): ICD-10-CM

## 2024-08-22 DIAGNOSIS — E87.1 HYPONATREMIA: Primary | ICD-10-CM

## 2024-08-22 DIAGNOSIS — R35.0 URINARY FREQUENCY: ICD-10-CM

## 2024-08-22 DIAGNOSIS — K62.89 RECTAL PAIN: ICD-10-CM

## 2024-08-22 DIAGNOSIS — I10 PRIMARY HYPERTENSION: ICD-10-CM

## 2024-08-22 DIAGNOSIS — Z98.890 HISTORY OF EPISIOTOMY: ICD-10-CM

## 2024-08-22 LAB
ALBUMIN SERPL-MCNC: 4.2 G/DL (ref 3.5–5)
ALBUMIN/GLOB SERPL: 1.4 (ref 1.1–2.2)
ALP SERPL-CCNC: 80 U/L (ref 45–117)
ALT SERPL-CCNC: 28 U/L (ref 12–78)
ANION GAP SERPL CALC-SCNC: 2 MMOL/L (ref 5–15)
AST SERPL-CCNC: 25 U/L (ref 15–37)
BILIRUB SERPL-MCNC: 0.8 MG/DL (ref 0.2–1)
BUN SERPL-MCNC: 16 MG/DL (ref 6–20)
BUN/CREAT SERPL: 15 (ref 12–20)
CALCIUM SERPL-MCNC: 10.2 MG/DL (ref 8.5–10.1)
CHLORIDE SERPL-SCNC: 108 MMOL/L (ref 97–108)
CO2 SERPL-SCNC: 32 MMOL/L (ref 21–32)
CREAT SERPL-MCNC: 1.06 MG/DL (ref 0.55–1.02)
GLOBULIN SER CALC-MCNC: 2.9 G/DL (ref 2–4)
GLUCOSE SERPL-MCNC: 95 MG/DL (ref 65–100)
POTASSIUM SERPL-SCNC: 5 MMOL/L (ref 3.5–5.1)
PROT SERPL-MCNC: 7.1 G/DL (ref 6.4–8.2)
SODIUM SERPL-SCNC: 142 MMOL/L (ref 136–145)

## 2024-08-22 PROCEDURE — 3078F DIAST BP <80 MM HG: CPT | Performed by: FAMILY MEDICINE

## 2024-08-22 PROCEDURE — G8417 CALC BMI ABV UP PARAM F/U: HCPCS | Performed by: FAMILY MEDICINE

## 2024-08-22 PROCEDURE — 1090F PRES/ABSN URINE INCON ASSESS: CPT | Performed by: FAMILY MEDICINE

## 2024-08-22 PROCEDURE — 1123F ACP DISCUSS/DSCN MKR DOCD: CPT | Performed by: FAMILY MEDICINE

## 2024-08-22 PROCEDURE — 99214 OFFICE O/P EST MOD 30 MIN: CPT | Performed by: FAMILY MEDICINE

## 2024-08-22 PROCEDURE — G8427 DOCREV CUR MEDS BY ELIG CLIN: HCPCS | Performed by: FAMILY MEDICINE

## 2024-08-22 PROCEDURE — G8399 PT W/DXA RESULTS DOCUMENT: HCPCS | Performed by: FAMILY MEDICINE

## 2024-08-22 PROCEDURE — 1036F TOBACCO NON-USER: CPT | Performed by: FAMILY MEDICINE

## 2024-08-22 PROCEDURE — 3074F SYST BP LT 130 MM HG: CPT | Performed by: FAMILY MEDICINE

## 2024-08-22 PROCEDURE — 3017F COLORECTAL CA SCREEN DOC REV: CPT | Performed by: FAMILY MEDICINE

## 2024-08-22 ASSESSMENT — ENCOUNTER SYMPTOMS
RECTAL PAIN: 1
WHEEZING: 0
VOMITING: 0
ABDOMINAL PAIN: 0
CONSTIPATION: 0
COUGH: 0
CHEST TIGHTNESS: 0
NAUSEA: 0
SHORTNESS OF BREATH: 0
BLOOD IN STOOL: 0
ANAL BLEEDING: 0
DIARRHEA: 0

## 2024-08-23 LAB
BACTERIA SPEC CULT: NORMAL
SERVICE CMNT-IMP: NORMAL

## 2024-09-19 ENCOUNTER — TRANSCRIBE ORDERS (OUTPATIENT)
Facility: HOSPITAL | Age: 74
End: 2024-09-19

## 2024-09-19 DIAGNOSIS — R19.4 CHANGE IN BOWEL HABIT: Primary | ICD-10-CM

## 2024-09-19 DIAGNOSIS — K57.92 DIVERTICULITIS: ICD-10-CM

## 2024-10-03 ENCOUNTER — HOSPITAL ENCOUNTER (OUTPATIENT)
Facility: HOSPITAL | Age: 74
Discharge: HOME | End: 2024-10-06
Attending: COLON & RECTAL SURGERY
Payer: MEDICARE

## 2024-10-03 DIAGNOSIS — R19.4 CHANGE IN BOWEL HABIT: ICD-10-CM

## 2024-10-03 DIAGNOSIS — K57.92 DIVERTICULITIS: ICD-10-CM

## 2024-10-03 PROCEDURE — 6360000004 HC RX CONTRAST MEDICATION: Performed by: COLON & RECTAL SURGERY

## 2024-10-03 PROCEDURE — 74177 CT ABD & PELVIS W/CONTRAST: CPT

## 2024-10-03 PROCEDURE — 2500000003 HC RX 250 WO HCPCS: Performed by: COLON & RECTAL SURGERY

## 2024-10-03 RX ORDER — IOPAMIDOL 755 MG/ML
100 INJECTION, SOLUTION INTRAVASCULAR
Status: COMPLETED | OUTPATIENT
Start: 2024-10-03 | End: 2024-10-03

## 2024-10-03 RX ADMIN — IOPAMIDOL 100 ML: 755 INJECTION, SOLUTION INTRAVENOUS at 11:39

## 2024-10-03 RX ADMIN — BARIUM SULFATE 900 ML: 20 SUSPENSION ORAL at 11:39

## 2024-10-04 ENCOUNTER — TELEPHONE (OUTPATIENT)
Age: 74
End: 2024-10-04

## 2024-10-04 NOTE — TELEPHONE ENCOUNTER
Called Patient. Name and  confirmed.  She needs name of imaging and address for x ray which is order by Dr. Bauer. Informed her that she needs to call Dr. Bauer office about imaging. She verbalized understanding.

## 2024-10-04 NOTE — TELEPHONE ENCOUNTER
Patient called and stated that she need a list of imaging names and addresses. She would like you to call her back at [phone]

## 2024-10-07 ENCOUNTER — HOSPITAL ENCOUNTER (OUTPATIENT)
Facility: HOSPITAL | Age: 74
Discharge: HOME | End: 2024-10-10
Attending: COLON & RECTAL SURGERY
Payer: MEDICARE

## 2024-10-07 DIAGNOSIS — R19.4 CHANGE IN BOWEL HABITS: ICD-10-CM

## 2024-10-07 PROCEDURE — 74018 RADEX ABDOMEN 1 VIEW: CPT

## 2024-10-09 ENCOUNTER — HOSPITAL ENCOUNTER (OUTPATIENT)
Facility: HOSPITAL | Age: 74
Discharge: HOME | End: 2024-10-12
Attending: COLON & RECTAL SURGERY
Payer: MEDICARE

## 2024-10-09 DIAGNOSIS — R19.4 CHANGE IN BOWEL HABITS: ICD-10-CM

## 2024-10-09 PROCEDURE — 74018 RADEX ABDOMEN 1 VIEW: CPT

## 2024-10-11 ENCOUNTER — HOSPITAL ENCOUNTER (OUTPATIENT)
Facility: HOSPITAL | Age: 74
Discharge: HOME | End: 2024-10-14
Attending: COLON & RECTAL SURGERY
Payer: MEDICARE

## 2024-10-11 DIAGNOSIS — R19.4 CHANGE IN BOWEL HABITS: ICD-10-CM

## 2024-10-11 PROCEDURE — 74018 RADEX ABDOMEN 1 VIEW: CPT

## 2024-10-14 ENCOUNTER — TELEPHONE (OUTPATIENT)
Age: 74
End: 2024-10-14

## 2024-10-14 RX ORDER — AMLODIPINE BESYLATE 5 MG/1
5 TABLET ORAL DAILY
Qty: 30 TABLET | Refills: 5 | Status: SHIPPED | OUTPATIENT
Start: 2024-10-14

## 2024-10-14 NOTE — TELEPHONE ENCOUNTER
Chief Complaint   Patient presents with    Hypertension     Called Patient. Spoke with . Name and  confirmed. He stated that her BP was high 200/75. She is taking hydrochlorothiazide 12.5 mg and her BP is under control 130/70. Informed Dr. Weiss about that.    Called Patient. Spoke with . Name and  confirmed. Informed him that Dr. Weiss wants to start taking amlodipine  and Stop Hydrochlorothiazide. Scheduled in office visit on 10/16/24 at 11 AM. Verbalized understanding.     Local [facility].

## 2024-10-16 ENCOUNTER — OFFICE VISIT (OUTPATIENT)
Age: 74
End: 2024-10-16
Payer: MEDICARE

## 2024-10-16 VITALS
WEIGHT: 178.2 LBS | DIASTOLIC BLOOD PRESSURE: 74 MMHG | TEMPERATURE: 97.7 F | HEIGHT: 62 IN | OXYGEN SATURATION: 98 % | HEART RATE: 64 BPM | SYSTOLIC BLOOD PRESSURE: 132 MMHG | BODY MASS INDEX: 32.79 KG/M2 | RESPIRATION RATE: 16 BRPM

## 2024-10-16 DIAGNOSIS — I10 PRIMARY HYPERTENSION: Primary | ICD-10-CM

## 2024-10-16 DIAGNOSIS — M79.602 LEFT ARM PAIN: ICD-10-CM

## 2024-10-16 DIAGNOSIS — R19.8 CHANGE IN BOWEL MOVEMENT: ICD-10-CM

## 2024-10-16 PROCEDURE — 99214 OFFICE O/P EST MOD 30 MIN: CPT | Performed by: FAMILY MEDICINE

## 2024-10-16 PROCEDURE — 93005 ELECTROCARDIOGRAM TRACING: CPT | Performed by: FAMILY MEDICINE

## 2024-10-16 ASSESSMENT — ENCOUNTER SYMPTOMS
ABDOMINAL PAIN: 0
CHEST TIGHTNESS: 0
WHEEZING: 0
SHORTNESS OF BREATH: 0
NAUSEA: 0
DIARRHEA: 0
COUGH: 0
CONSTIPATION: 0
VOMITING: 0

## 2024-10-16 NOTE — PROGRESS NOTES
Patient Name: Yanet Posada   MRN: [id number]    ASSESSMENT AND PLAN  Yanet Posada is a 74 y.o. female who presents today for:    1. Primary hypertension  EKG NSR. Stable, continue current treatment.  - AMB POC EKG ROUTINE    2. Left arm pain  Likely MSK strain; EKG NSR.  - AMB POC EKG ROUTINE      No orders of the defined types were placed in this encounter.       There are no discontinued medications.    SUBJECTIVE  Yanet Posada is a 74 y.o. female who presents with the following:     Patient states that she has been under a lot of stress.  She started to have elevated blood pressure readings all the way up to 208/83 and feeling unwell.  She restarted an old prescription of HCTZ which was previously discontinued due to hyponatremia.  We then stopped her HCTZ and switch her to amlodipine and she has been taking this for the past 2 days.  She overall feels better and home readings range between 130-168/68-76.  Has had some ongoing left arm pain but no chest pain or shortness of breath.  Currently under work up for change in bowel movements with Dr. Bauer; they are awaiting several tests.     BP Readings from Last 3 Encounters:   10/16/24 132/74   08/22/24 128/72   06/05/24 122/62     Wt Readings from Last 3 Encounters:   10/16/24 80.8 kg (178 lb 3.2 oz)   08/22/24 81.7 kg (180 lb 3.2 oz)   05/21/24 79.5 kg (175 lb 3.2 oz)       Review of Systems   Constitutional:  Negative for activity change, appetite change, fatigue, fever and unexpected weight change.   Respiratory:  Negative for cough, chest tightness, shortness of breath and wheezing.    Cardiovascular:  Negative for chest pain, palpitations and leg swelling.   Gastrointestinal:  Negative for abdominal pain, constipation, diarrhea, nausea and vomiting.   Genitourinary:  Negative for dysuria, frequency and urgency.   Musculoskeletal:  Positive for arthralgias.   Skin:  Negative for rash.   Neurological:  Negative for dizziness, weakness and headaches.

## 2024-10-16 NOTE — PROGRESS NOTES
Chief Complaint   Patient presents with    Follow-up     On BP     \"Have you been to the ER, urgent care clinic since your last visit?  Hospitalized since your last visit?\"    NO    “Have you seen or consulted any other health care providers outside of [facility] since your last visit?”    NO    Financial Resource Strain: Patient Declined (8/19/2024)    Overall Financial Resource Strain (CARDIA)     Difficulty of Paying Living Expenses: Patient declined      Food Insecurity: Patient Declined (8/19/2024)    Hunger Vital Sign     Worried About Running Out of Food in the Last Year: Patient declined     Ran Out of Food in the Last Year: Patient declined            1/5/2024    12:40 PM   PHQ-9    Little interest or pleasure in doing things 1   Feeling down, depressed, or hopeless 1   Trouble falling or staying asleep, or sleeping too much 0   Feeling tired or having little energy 2   Poor appetite or overeating 0   Feeling bad about yourself - or that you are a failure or have let yourself or your family down 0   Trouble concentrating on things, such as reading the newspaper or watching television 0   Moving or speaking so slowly that other people could have noticed. Or the opposite - being so fidgety or restless that you have been moving around a lot more than usual 0   Thoughts that you would be better off dead, or of hurting yourself in some way 0   PHQ-2 Score 2   PHQ-9 Total Score 4   If you checked off any problems, how difficult have these problems made it for you to do your work, take care of things at home, or get along with other people? 0       Health Maintenance Due   Topic Date Due    Respiratory Syncytial Virus (RSV) Pregnant or age 60 yrs+ (1 - 1-dose 60+ series) Never done    Hepatitis B vaccine (3 of 3 - 19+ 3-dose series) 03/09/2016    Pneumococcal 65+ years Vaccine (2 of 2 - PCV) 07/22/2017    Flu vaccine (1) 08/01/2024    COVID-19 Vaccine (6 - 2023-24 season) 09/01/2024

## 2024-11-01 ENCOUNTER — TELEPHONE (OUTPATIENT)
Age: 74
End: 2024-11-01

## 2024-11-01 RX ORDER — AMLODIPINE BESYLATE 10 MG/1
10 TABLET ORAL DAILY
Qty: 30 TABLET | Refills: 5 | Status: SHIPPED | OUTPATIENT
Start: 2024-11-01

## 2024-11-01 NOTE — TELEPHONE ENCOUNTER
Spoke with patient. She stated that her BP has been high between 168 and 184 systolic. She wants to increase her dose and send to pharmacy.

## 2024-11-01 NOTE — TELEPHONE ENCOUNTER
Called Patient. Name and  confirmed.  Informed her as per Dr. Weiss's note. Verbalized understanding.

## 2024-11-01 NOTE — TELEPHONE ENCOUNTER
Increased amlodipine to 10 mg; new Rx sent. Continue to monitor BP. This is the max dose she can take of amlodipine.

## 2024-11-04 DIAGNOSIS — R13.10 DYSPHAGIA, UNSPECIFIED TYPE: ICD-10-CM

## 2024-11-04 RX ORDER — FAMOTIDINE 20 MG/1
20 TABLET, FILM COATED ORAL DAILY
Qty: 90 TABLET | Refills: 1 | Status: SHIPPED | OUTPATIENT
Start: 2024-11-04

## 2024-11-04 NOTE — TELEPHONE ENCOUNTER
Last appointment: 10/16/24 Isela  Next appointment: 2/27/25 Isela  Previous refill encounter(s): 5/6/24 90 + 1    Requested Prescriptions     Pending Prescriptions Disp Refills    famotidine (PEPCID) 20 MG tablet 90 tablet 1     Sig: Take 1 tablet by mouth daily     For Pharmacy Admin Tracking Only    Program: Medication Refill  CPA in place:    Recommendation Provided To:   Intervention Detail: New Rx: 1, reason: Patient Preference  Intervention Accepted By:   Gap Closed?:    Time Spent (min): 5

## 2024-11-21 NOTE — TELEPHONE ENCOUNTER
Patricia has an upcoming appointment scheduled in IV therapy 24 for Vyepti.  There is no drug in her therapy plan.  The PA  24.  Thank you!   Referral information sent via [facility]

## 2024-12-23 RX ORDER — SIMVASTATIN 40 MG
TABLET ORAL
Qty: 90 TABLET | Refills: 1 | Status: SHIPPED | OUTPATIENT
Start: 2024-12-23

## 2024-12-23 NOTE — TELEPHONE ENCOUNTER
PCP: Tani Weiss MD    Last appt: 10/16/2024       Future Appointments   Date Time Provider Department Center   1/13/2025  8:30 AM Dilia Lewis APRN - NP [facility] BS AMB   2/27/2025  8:45 AM Tani Weiss MD PA[facility]   6/17/2025 10:00 AM Mark Dangelo PSYD [facility] BS AMB       Requested Prescriptions     Pending Prescriptions Disp Refills    simvastatin (ZOCOR) 40 MG tablet [Pharmacy Med Name: Simvastatin Oral Tablet 40 MG] 90 tablet 3     Sig: TAKE 1 TABLET EVERY NIGHT       Prior labs and Blood pressures:  BP Readings from Last 3 Encounters:   10/16/24 132/74   08/22/24 128/72   06/05/24 122/62     Lab Results   Component Value Date/Time     08/22/2024 11:15 AM    K 5.0 08/22/2024 11:15 AM     08/22/2024 11:15 AM    CO2 32 08/22/2024 11:15 AM    BUN 16 08/22/2024 11:15 AM    GFRAA 60 07/25/2022 06:25 PM     No results found for: \"HBA1C\", \"MIH8RHYS\"  Lab Results   Component Value Date/Time    CHOL 108 01/08/2024 10:06 AM    HDL 45 01/08/2024 10:06 AM    LDL 48.2 01/08/2024 10:06 AM    VLDL 14.8 01/08/2024 10:06 AM     No results found for: \"VITD3\"    Lab Results   Component Value Date/Time    TSH 0.88 05/09/2024 05:08 AM

## 2025-01-13 ENCOUNTER — OFFICE VISIT (OUTPATIENT)
Age: 75
End: 2025-01-13
Payer: MEDICARE

## 2025-01-13 VITALS
HEART RATE: 53 BPM | OXYGEN SATURATION: 96 % | BODY MASS INDEX: 33.49 KG/M2 | SYSTOLIC BLOOD PRESSURE: 121 MMHG | HEIGHT: 62 IN | WEIGHT: 182 LBS | DIASTOLIC BLOOD PRESSURE: 56 MMHG

## 2025-01-13 DIAGNOSIS — K75.81 NASH (NONALCOHOLIC STEATOHEPATITIS): Primary | ICD-10-CM

## 2025-01-13 LAB
ALBUMIN SERPL-MCNC: 4.1 G/DL (ref 3.5–5)
ALBUMIN/GLOB SERPL: 1.4 (ref 1.1–2.2)
ALP SERPL-CCNC: 72 U/L (ref 45–117)
ALT SERPL-CCNC: 29 U/L (ref 12–78)
ANION GAP SERPL CALC-SCNC: 7 MMOL/L (ref 2–12)
AST SERPL-CCNC: 14 U/L (ref 15–37)
BASOPHILS # BLD: 0.08 K/UL (ref 0–0.1)
BASOPHILS NFR BLD: 1.1 % (ref 0–1)
BILIRUB SERPL-MCNC: 1.2 MG/DL (ref 0.2–1)
BUN SERPL-MCNC: 21 MG/DL (ref 6–20)
BUN/CREAT SERPL: 20 (ref 12–20)
CALCIUM SERPL-MCNC: 10.2 MG/DL (ref 8.5–10.1)
CHLORIDE SERPL-SCNC: 107 MMOL/L (ref 97–108)
CO2 SERPL-SCNC: 27 MMOL/L (ref 21–32)
CREAT SERPL-MCNC: 1.03 MG/DL (ref 0.55–1.02)
DIFFERENTIAL METHOD BLD: ABNORMAL
EOSINOPHIL # BLD: 0.17 K/UL (ref 0–0.4)
EOSINOPHIL NFR BLD: 2.3 % (ref 0–7)
ERYTHROCYTE [DISTWIDTH] IN BLOOD BY AUTOMATED COUNT: 12.1 % (ref 11.5–14.5)
GLOBULIN SER CALC-MCNC: 2.9 G/DL (ref 2–4)
GLUCOSE SERPL-MCNC: 96 MG/DL (ref 65–100)
HCT VFR BLD AUTO: 40.2 % (ref 35–47)
HGB BLD-MCNC: 13.4 G/DL (ref 11.5–16)
IMM GRANULOCYTES # BLD AUTO: 0.01 K/UL (ref 0–0.04)
IMM GRANULOCYTES NFR BLD AUTO: 0.1 % (ref 0–0.5)
LYMPHOCYTES # BLD: 2.09 K/UL (ref 0.8–3.5)
LYMPHOCYTES NFR BLD: 27.7 % (ref 12–49)
MCH RBC QN AUTO: 29.4 PG (ref 26–34)
MCHC RBC AUTO-ENTMCNC: 33.3 G/DL (ref 30–36.5)
MCV RBC AUTO: 88.2 FL (ref 80–99)
MONOCYTES # BLD: 0.8 K/UL (ref 0–1)
MONOCYTES NFR BLD: 10.6 % (ref 5–13)
NEUTS SEG # BLD: 4.4 K/UL (ref 1.8–8)
NEUTS SEG NFR BLD: 58.2 % (ref 32–75)
NRBC # BLD: 0 K/UL (ref 0–0.01)
NRBC BLD-RTO: 0 PER 100 WBC
PLATELET # BLD AUTO: 204 K/UL (ref 150–400)
PMV BLD AUTO: 10.6 FL (ref 8.9–12.9)
POTASSIUM SERPL-SCNC: 4.6 MMOL/L (ref 3.5–5.1)
PROT SERPL-MCNC: 7 G/DL (ref 6.4–8.2)
RBC # BLD AUTO: 4.56 M/UL (ref 3.8–5.2)
SODIUM SERPL-SCNC: 141 MMOL/L (ref 136–145)
WBC # BLD AUTO: 7.6 K/UL (ref 3.6–11)

## 2025-01-13 PROCEDURE — 99214 OFFICE O/P EST MOD 30 MIN: CPT | Performed by: NURSE PRACTITIONER

## 2025-01-13 PROCEDURE — 3078F DIAST BP <80 MM HG: CPT | Performed by: NURSE PRACTITIONER

## 2025-01-13 PROCEDURE — 1036F TOBACCO NON-USER: CPT | Performed by: NURSE PRACTITIONER

## 2025-01-13 PROCEDURE — 3017F COLORECTAL CA SCREEN DOC REV: CPT | Performed by: NURSE PRACTITIONER

## 2025-01-13 PROCEDURE — 1160F RVW MEDS BY RX/DR IN RCRD: CPT | Performed by: NURSE PRACTITIONER

## 2025-01-13 PROCEDURE — 1090F PRES/ABSN URINE INCON ASSESS: CPT | Performed by: NURSE PRACTITIONER

## 2025-01-13 PROCEDURE — G8427 DOCREV CUR MEDS BY ELIG CLIN: HCPCS | Performed by: NURSE PRACTITIONER

## 2025-01-13 PROCEDURE — G8417 CALC BMI ABV UP PARAM F/U: HCPCS | Performed by: NURSE PRACTITIONER

## 2025-01-13 PROCEDURE — 1123F ACP DISCUSS/DSCN MKR DOCD: CPT | Performed by: NURSE PRACTITIONER

## 2025-01-13 PROCEDURE — 91200 LIVER ELASTOGRAPHY: CPT | Performed by: NURSE PRACTITIONER

## 2025-01-13 PROCEDURE — 3074F SYST BP LT 130 MM HG: CPT | Performed by: NURSE PRACTITIONER

## 2025-01-13 PROCEDURE — 1159F MED LIST DOCD IN RCRD: CPT | Performed by: NURSE PRACTITIONER

## 2025-01-13 PROCEDURE — G8399 PT W/DXA RESULTS DOCUMENT: HCPCS | Performed by: NURSE PRACTITIONER

## 2025-01-13 NOTE — PROGRESS NOTES
ceived the following from [facility]: Outside name: CALCIUM 600 WITH VITAMIN D3 600 mg(1,500mg) -400 unit chew    famotidine (PEPCID) 20 mg, Oral, DAILY    haloperidol (HALDOL) 0.5 mg, Oral, Nightly    levocetirizine (XYZAL) 5 mg, Oral, DAILY RESP    simvastatin (ZOCOR) 40 MG tablet TAKE 1 TABLET EVERY NIGHT     FAMILY HISTORY:  The patient is adopted and does not know any of his family history.    SOCIAL HISTORY:  The patient is .    The patient has 2 children, 5 grandchildren, 3 GGC  The patient has never used tobacco products.    The patient has never consumed significant amounts of alcohol.    The patient does not work outside the home.      PHYSICAL EXAMINATION:  BP (!) 121/56   Pulse 53   Ht 1.575 m (5' 2\")   Wt 82.6 kg (182 lb)   SpO2 96%   BMI 33.29 kg/m²       General: No acute distress.   Eyes: Sclera anicteric.   Skin: No spider angiomata.  No jaundice.  No palmar erythema.  Abdomen: Soft non-tender, liver size normal to percussion/palpation.  Spleen not palpable. No obvious ascites.  Extremities: No edema.  No muscle wasting.  No gross arthritic changes.  Neurologic: Alert and oriented.  Cranial nerves grossly intact.  No asterixis.    LABORATORY STUDIES:   Latest Ref [facility] 5/21/2024   RENÉ - Routine Labs     WBC 3.6 - 11.0 K/uL 9.4    ANC 1.8 - 8.0 K/UL 6.5    HGB 11.5 - 16.0 g/dL 12.8     - 400 K/uL 346       Latest Ref [facility] 8/22/2024   ERNÉ - Routine Labs     AST 15 - 37 U/L 25    ALT 12 - 78 U/L 28    Alk Phos 45 - 117 U/L 80    Bili, Total 0.2 - 1.0 MG/DL 0.8    Bili, Direct 0.0 - 0.2 MG/DL    Albumin 3.5 - 5.0 g/dL 4.2    BUN 6 - 20 MG/DL 16    Creat 0.55 - 1.02 MG/DL 1.06 (H)    Na 136 - 145 mmol/L 142    K 3.5 - 5.1 mmol/L 5.0    Cl 97 - 108 mmol/L 108    CO2 21 - 32 mmol/L 32    Glucose 65 - 100 mg/dL 95       Laboratory testing from 8/22/2024 reviewed in detail. Additional testing included to evaluate progression or regression of disease. Laboratory testing

## 2025-02-24 SDOH — HEALTH STABILITY: PHYSICAL HEALTH: ON AVERAGE, HOW MANY DAYS PER WEEK DO YOU ENGAGE IN MODERATE TO STRENUOUS EXERCISE (LIKE A BRISK WALK)?: 0 DAYS

## 2025-02-24 SDOH — HEALTH STABILITY: PHYSICAL HEALTH: ON AVERAGE, HOW MANY MINUTES DO YOU ENGAGE IN EXERCISE AT THIS LEVEL?: 0 MIN

## 2025-02-24 ASSESSMENT — PATIENT HEALTH QUESTIONNAIRE - PHQ9
9. THOUGHTS THAT YOU WOULD BE BETTER OFF DEAD, OR OF HURTING YOURSELF: NOT AT ALL
SUM OF ALL RESPONSES TO PHQ9 QUESTIONS 1 & 2: 0
SUM OF ALL RESPONSES TO PHQ QUESTIONS 1-9: 0
10. IF YOU CHECKED OFF ANY PROBLEMS, HOW DIFFICULT HAVE THESE PROBLEMS MADE IT FOR YOU TO DO YOUR WORK, TAKE CARE OF THINGS AT HOME, OR GET ALONG WITH OTHER PEOPLE: NOT DIFFICULT AT ALL
3. TROUBLE FALLING OR STAYING ASLEEP: NOT AT ALL
2. FEELING DOWN, DEPRESSED OR HOPELESS: NOT AT ALL
SUM OF ALL RESPONSES TO PHQ QUESTIONS 1-9: 0
SUM OF ALL RESPONSES TO PHQ QUESTIONS 1-9: 0
6. FEELING BAD ABOUT YOURSELF - OR THAT YOU ARE A FAILURE OR HAVE LET YOURSELF OR YOUR FAMILY DOWN: NOT AT ALL
7. TROUBLE CONCENTRATING ON THINGS, SUCH AS READING THE NEWSPAPER OR WATCHING TELEVISION: NOT AT ALL
5. POOR APPETITE OR OVEREATING: NOT AT ALL
SUM OF ALL RESPONSES TO PHQ QUESTIONS 1-9: 0
1. LITTLE INTEREST OR PLEASURE IN DOING THINGS: NOT AT ALL
8. MOVING OR SPEAKING SO SLOWLY THAT OTHER PEOPLE COULD HAVE NOTICED. OR THE OPPOSITE, BEING SO FIGETY OR RESTLESS THAT YOU HAVE BEEN MOVING AROUND A LOT MORE THAN USUAL: NOT AT ALL
4. FEELING TIRED OR HAVING LITTLE ENERGY: NOT AT ALL

## 2025-02-24 ASSESSMENT — LIFESTYLE VARIABLES
HOW OFTEN DO YOU HAVE A DRINK CONTAINING ALCOHOL: 1
HOW MANY STANDARD DRINKS CONTAINING ALCOHOL DO YOU HAVE ON A TYPICAL DAY: PATIENT DOES NOT DRINK
HOW OFTEN DO YOU HAVE A DRINK CONTAINING ALCOHOL: NEVER
HOW MANY STANDARD DRINKS CONTAINING ALCOHOL DO YOU HAVE ON A TYPICAL DAY: 0
HOW OFTEN DO YOU HAVE SIX OR MORE DRINKS ON ONE OCCASION: 1

## 2025-02-27 ENCOUNTER — OFFICE VISIT (OUTPATIENT)
Age: 75
End: 2025-02-27
Payer: MEDICARE

## 2025-02-27 VITALS
OXYGEN SATURATION: 99 % | DIASTOLIC BLOOD PRESSURE: 72 MMHG | HEART RATE: 62 BPM | HEIGHT: 62 IN | RESPIRATION RATE: 18 BRPM | TEMPERATURE: 97.3 F | BODY MASS INDEX: 33.71 KG/M2 | SYSTOLIC BLOOD PRESSURE: 134 MMHG | WEIGHT: 183.2 LBS

## 2025-02-27 DIAGNOSIS — Z13.89 ENCOUNTER FOR SCREENING FOR OTHER DISORDER: ICD-10-CM

## 2025-02-27 DIAGNOSIS — E78.5 HYPERLIPIDEMIA, UNSPECIFIED HYPERLIPIDEMIA TYPE: ICD-10-CM

## 2025-02-27 DIAGNOSIS — R73.03 PREDIABETES: ICD-10-CM

## 2025-02-27 DIAGNOSIS — Z13.6 SCREENING FOR CARDIOVASCULAR CONDITION: ICD-10-CM

## 2025-02-27 DIAGNOSIS — Z00.00 ENCOUNTER FOR MEDICARE ANNUAL WELLNESS EXAM: Primary | ICD-10-CM

## 2025-02-27 DIAGNOSIS — I10 PRIMARY HYPERTENSION: ICD-10-CM

## 2025-02-27 DIAGNOSIS — R10.30 LOWER ABDOMINAL PAIN: ICD-10-CM

## 2025-02-27 DIAGNOSIS — F20.9 SCHIZOPHRENIA, UNSPECIFIED TYPE (HCC): ICD-10-CM

## 2025-02-27 DIAGNOSIS — J34.89 SINUS PRESSURE: ICD-10-CM

## 2025-02-27 DIAGNOSIS — N89.8 VAGINAL DISCHARGE: ICD-10-CM

## 2025-02-27 LAB
BILIRUBIN, URINE, POC: NEGATIVE
BLOOD URINE, POC: NORMAL
GLUCOSE URINE, POC: NEGATIVE
KETONES, URINE, POC: NEGATIVE
LEUKOCYTE ESTERASE, URINE, POC: NORMAL
NITRITE, URINE, POC: NEGATIVE
PH, URINE, POC: 5.5 (ref 4.6–8)
PROTEIN,URINE, POC: NEGATIVE
SPECIFIC GRAVITY, URINE, POC: 1.02 (ref 1–1.03)
URINALYSIS CLARITY, POC: CLEAR
URINALYSIS COLOR, POC: YELLOW
UROBILINOGEN, POC: NORMAL MG/DL

## 2025-02-27 PROCEDURE — G0446 INTENS BEHAVE THER CARDIO DX: HCPCS | Performed by: FAMILY MEDICINE

## 2025-02-27 PROCEDURE — 81001 URINALYSIS AUTO W/SCOPE: CPT | Performed by: FAMILY MEDICINE

## 2025-02-27 PROCEDURE — G0447 BEHAVIOR COUNSEL OBESITY 15M: HCPCS | Performed by: FAMILY MEDICINE

## 2025-02-27 PROCEDURE — 99214 OFFICE O/P EST MOD 30 MIN: CPT | Performed by: FAMILY MEDICINE

## 2025-02-27 NOTE — ASSESSMENT & PLAN NOTE
Chronic, at goal (stable), continue current plan pending work up below    Orders:    Lipid Panel; Future    Comprehensive Metabolic Panel; Future    Comprehensive Metabolic Panel    Lipid Panel

## 2025-02-27 NOTE — PROGRESS NOTES
Medicare Annual Wellness Visit    Yanet Posada is here for Medicare AWV, Lower Abdominal Pain, and Vaginal Discharge (Yellowish discharge ; about a week )    Assessment & Plan   Encounter for Medicare annual wellness exam  Schizophrenia, unspecified type (HCC)  Assessment & Plan:   Chronic, at goal (stable), continue current treatment plan         Primary hypertension  Assessment & Plan:   Chronic, at goal (stable), continue current treatment plan         Prediabetes  Assessment & Plan:   Chronic, at goal (stable), continue current plan pending work up below    Orders:    Hemoglobin A1C; Future    Hemoglobin A1C    Orders:  -     Hemoglobin A1C; Future  Hyperlipidemia, unspecified hyperlipidemia type  Assessment & Plan:   Chronic, at goal (stable), continue current plan pending work up below    Orders:    Lipid Panel; Future    Comprehensive Metabolic Panel; Future    Comprehensive Metabolic Panel    Lipid Panel    Orders:  -     Lipid Panel; Future  -     Comprehensive Metabolic Panel; Future  Vaginal discharge  -     Nuswab Vaginitis Plus (VG+); Future  Lower abdominal pain  -     AMB POC URINALYSIS DIP STICK AUTO W/ MICRO  -     Culture, Urine; Future  Sinus pressure  Body mass index (BMI) of 33.0-33.9 in adult  -     KS Behavior  obesity (8-15 min) []  Screening for cardiovascular condition  -     KS Intensive Behavior Counseling for Cardiovascular Diseases, 8-15 minutes []  Encounter for screening for other disorder [Z13.89]       Return in about 6 months (around 8/27/2025) for HTN.     Subjective     Patient's complete Health Risk Assessment and screening values have been reviewed and are found in Flowsheets. The following problems were reviewed today and where indicated follow up appointments were made and/or referrals ordered.    Positive Risk Factor Screenings with Interventions:              Inactivity:  On average, how many days per week do you engage in moderate to strenuous exercise

## 2025-02-27 NOTE — PROGRESS NOTES
Chief Complaint   Patient presents with    Medicare AWV    Lower Abdominal Pain    Vaginal Discharge     Yellowish discharge ; about a week      \"Have you been to the ER, urgent care clinic since your last visit?  Hospitalized since your last visit?\"    NO    “Have you seen or consulted any other health care providers outside of [facility] since your last visit?”    NO     Financial Resource Strain: Low Risk  (12/5/2024)    Overall Financial Resource Strain (CARDIA)     Difficulty of Paying Living Expenses: Not hard at all      Food Insecurity: No Food Insecurity (12/5/2024)    Hunger Vital Sign     Worried About Running Out of Food in the Last Year: Never true     Ran Out of Food in the Last Year: Never true            2/24/2025     8:05 AM   PHQ-9    Little interest or pleasure in doing things 0   Feeling down, depressed, or hopeless 0   Trouble falling or staying asleep, or sleeping too much 0   Feeling tired or having little energy 0   Poor appetite or overeating 0   Feeling bad about yourself - or that you are a failure or have let yourself or your family down 0   Trouble concentrating on things, such as reading the newspaper or watching television 0   Moving or speaking so slowly that other people could have noticed. Or the opposite - being so fidgety or restless that you have been moving around a lot more than usual 0   Thoughts that you would be better off dead, or of hurting yourself in some way 0   PHQ-2 Score 0   PHQ-9 Total Score 0   If you checked off any problems, how difficult have these problems made it for you to do your work, take care of things at home, or get along with other people? 0       Health Maintenance Due   Topic Date Due    Respiratory Syncytial Virus (RSV) Pregnant or age 60 yrs+ (1 - Risk 60-74 years 1-dose series) Never done    Hepatitis B vaccine (3 of 3 - 19+ 3-dose series) 03/09/2016    Pneumococcal 50+ years Vaccine (2 of 2 - PCV) 07/22/2017    COVID-19 Vaccine (6

## 2025-02-27 NOTE — PROGRESS NOTES
Patient Name: Yanet Posada   MRN: [id number]    ASSESSMENT AND PLAN  Yanet Posada is a 74 y.o. female who presents today for:    Assessment & Plan  Encounter for Medicare annual wellness exam          Schizophrenia, unspecified type (HCC)   Chronic, at goal (stable), continue current treatment plan         Primary hypertension   Chronic, at goal (stable), continue current treatment plan         Prediabetes   Chronic, at goal (stable), continue current plan pending work up below    Orders:    Hemoglobin A1C; Future    Hemoglobin A1C    Hyperlipidemia, unspecified hyperlipidemia type   Chronic, at goal (stable), continue current plan pending work up below    Orders:    Lipid Panel; Future    Comprehensive Metabolic Panel; Future    Comprehensive Metabolic Panel    Lipid Panel    Vaginal discharge  Nuswab was obtained today. Pt to follow up with OBGYN.    Orders:    Nuswab Vaginitis Plus (VG+); Future    Nuswab Vaginitis Plus (VG+)    Lower abdominal pain   Will follow up with urine culture. Possible due to recent rectolecele surgery; pt to follow up with OBGYN.    Orders:    AMB POC URINALYSIS DIP STICK AUTO W/ MICRO    Culture, Urine; Future    Culture, Urine    Sinus pressure   Add on Flonase spray along with her antihistamine; if no improvement, consider sinusitis.         Body mass index (BMI) of 33.0-33.9 in adult   Chronic, at goal (stable), lifestyle modifications recommended    Orders:    PA Behavior  obesity (8-15 min) []    Screening for cardiovascular condition     Orders:    PA Intensive Behavior Counseling for Cardiovascular Diseases, 8-15 minutes []      No orders of the defined types were placed in this encounter.       There are no discontinued medications.    Return in about 6 months (around 8/27/2025) for HTN.      SUBJECTIVE  Yanet Posada is a 74 y.o. female who presents with the following:     Colon Cancer Screening: up to date.  Breast Cancer Screening: up to date  DEXA:  Spoke to patient and provided the following information:     Please review letter of instruction provided by surgeon's office (by mail, in person, or in [facility] under letters). This includes diet and fasting directions that may start the evening prior to surgery. If you have not received them, please reach out to surgeon's office as soon as possible.     Wear comfortable loose fitting clothing    Leave any valuables at home or with support person, including wallet and jewelry     Remove all jewelry and piercings, wedding bands included    Bring photo ID, insurance card and copay if needed.    Bathe or shower noc prior or am of surgery, no lotion, dry hair, no contacts and no deodorant.    Please take the following medications with a sip of water NONE    Hold cialis 3 days prior to   Hold ASA per providers     Arrival time will be given Monday 1/21 (2 business days prior to surgery) in the AFTERNOON     Disregard any text, automatic calls, or [facility] kenya times if given    [address] in [place] - come in through the first door and the surgery center door is immediately on your left - then proceed downstairs to check in.      Have an adult drive you to and from procedure and have someone at home with you for the first 24 hours post op.

## 2025-02-27 NOTE — ASSESSMENT & PLAN NOTE
Chronic, at goal (stable), continue current plan pending work up below    Orders:    Hemoglobin A1C; Future    Hemoglobin A1C

## 2025-02-28 LAB
ALBUMIN SERPL-MCNC: 4.1 G/DL (ref 3.5–5)
ALBUMIN/GLOB SERPL: 1.2 (ref 1.1–2.2)
ALP SERPL-CCNC: 90 U/L (ref 45–117)
ALT SERPL-CCNC: 28 U/L (ref 12–78)
ANION GAP SERPL CALC-SCNC: 4 MMOL/L (ref 2–12)
AST SERPL-CCNC: 18 U/L (ref 15–37)
BILIRUB SERPL-MCNC: 0.8 MG/DL (ref 0.2–1)
BUN SERPL-MCNC: 25 MG/DL (ref 6–20)
BUN/CREAT SERPL: 22 (ref 12–20)
CALCIUM SERPL-MCNC: 10.1 MG/DL (ref 8.5–10.1)
CHLORIDE SERPL-SCNC: 106 MMOL/L (ref 97–108)
CHOLEST SERPL-MCNC: 139 MG/DL
CO2 SERPL-SCNC: 28 MMOL/L (ref 21–32)
CREAT SERPL-MCNC: 1.16 MG/DL (ref 0.55–1.02)
EST. AVERAGE GLUCOSE BLD GHB EST-MCNC: 97 MG/DL
GLOBULIN SER CALC-MCNC: 3.3 G/DL (ref 2–4)
GLUCOSE SERPL-MCNC: 81 MG/DL (ref 65–100)
HBA1C MFR BLD: 5 % (ref 4–5.6)
HDLC SERPL-MCNC: 56 MG/DL
HDLC SERPL: 2.5 (ref 0–5)
LDLC SERPL CALC-MCNC: 66.6 MG/DL (ref 0–100)
POTASSIUM SERPL-SCNC: 4.3 MMOL/L (ref 3.5–5.1)
PROT SERPL-MCNC: 7.4 G/DL (ref 6.4–8.2)
SODIUM SERPL-SCNC: 138 MMOL/L (ref 136–145)
TRIGL SERPL-MCNC: 82 MG/DL
VLDLC SERPL CALC-MCNC: 16.4 MG/DL

## 2025-03-02 LAB
BACTERIA SPEC CULT: ABNORMAL
BACTERIA SPEC CULT: ABNORMAL
CC UR VC: ABNORMAL
SERVICE CMNT-IMP: ABNORMAL

## 2025-03-02 RX ORDER — CIPROFLOXACIN 250 MG/1
250 TABLET, FILM COATED ORAL 2 TIMES DAILY
Qty: 6 TABLET | Refills: 0 | Status: SHIPPED | OUTPATIENT
Start: 2025-03-02 | End: 2025-03-05

## 2025-03-04 LAB
A VAGINAE DNA VAG QL NAA+PROBE: NORMAL SCORE
BVAB2 DNA VAG QL NAA+PROBE: NORMAL SCORE
C ALBICANS DNA VAG QL NAA+PROBE: NEGATIVE
C GLABRATA DNA VAG QL NAA+PROBE: NEGATIVE
C TRACH RRNA SPEC QL NAA+PROBE: NEGATIVE
MEGA1 DNA VAG QL NAA+PROBE: NORMAL SCORE
N GONORRHOEA RRNA SPEC QL NAA+PROBE: NEGATIVE
SPECIMEN SOURCE: NORMAL
T VAGINALIS RRNA SPEC QL NAA+PROBE: NEGATIVE

## 2025-04-17 NOTE — ED PROVIDER NOTES
[facility] EMERGENCY DEP  EMERGENCY DEPARTMENT ENCOUNTER      Pt Name: Yanet Posada  MRN: [id number]  Birthdate 1950  Date of evaluation: 5/7/2024  Provider: Maverick Mendoza MD    CHIEF COMPLAINT       Chief Complaint   Patient presents with    Fall         HISTORY OF PRESENT ILLNESS    HPI  This is a 74-year-old female with a history of of CVA in the past.  According to the  this left her only with a slight delayed speech pattern but nothing else.  She has a history of some hypertension irritable bowel syndrome and schizophrenia.  According to the  she was doing well Sunday and was within normal state.  He says that on Monday morning she got up around 530 to use the bathroom and heard her fall.  Since that time she has had some confusion some increased speech difficulty and has not been able to ambulate on her own.  This is distinctly new for her.  EMS was called at the time and she refused to come to the hospital.  The  was unable to get her to come in until just this afternoon when he tells me that she has not had any improvement whatsoever in her inability to walk or speak well.  Patient denies any pain anywhere.  She has not had any fever or chills, cough or congestion and no other acute symptomatology.  The only other problem is she has not urinated or moved her bowels since yesterday.  She has not been eating or drinking well.  She voices no other acute complaint.    Review of External Medical Records:     Nursing Notes were reviewed.    REVIEW OF SYSTEMS       Review of Systems   Constitutional:  Positive for appetite change. Negative for activity change, chills, fatigue and fever.   HENT:  Negative for congestion, ear pain, rhinorrhea, sinus pressure, sinus pain, sore throat and trouble swallowing.    Eyes: Negative.    Respiratory:  Negative for cough, chest tightness, shortness of breath, wheezing and stridor.    Cardiovascular:  Negative for chest pain, palpitations and leg  Daughter Jessica returned call to schedule an appointment regarding a referral placed for a tcc home visit with a nurse practitioner.    Patient has been scheduled

## 2025-04-30 DIAGNOSIS — R13.10 DYSPHAGIA, UNSPECIFIED TYPE: ICD-10-CM

## 2025-04-30 RX ORDER — FAMOTIDINE 20 MG/1
20 TABLET, FILM COATED ORAL DAILY
Qty: 90 TABLET | Refills: 3 | Status: SHIPPED | OUTPATIENT
Start: 2025-04-30

## 2025-04-30 NOTE — TELEPHONE ENCOUNTER
Last appointment: 2/27/25 Isela  Next appointment: None- due 8/2025  Previous refill encounter(s): 11/4/24 90 + 1    Requested Prescriptions     Pending Prescriptions Disp Refills    famotidine (PEPCID) 20 MG tablet 90 tablet 1     Sig: Take 1 tablet by mouth daily     For Pharmacy Admin Tracking Only    Program: Medication Refill  CPA in place:    Recommendation Provided To:   Intervention Detail: New Rx: 1, reason: Patient Preference  Intervention Accepted By:   Gap Closed?:    Time Spent (min): 5

## 2025-05-01 RX ORDER — AMLODIPINE BESYLATE 10 MG/1
10 TABLET ORAL DAILY
Qty: 90 TABLET | Refills: 2 | Status: SHIPPED | OUTPATIENT
Start: 2025-05-01

## 2025-05-01 NOTE — TELEPHONE ENCOUNTER
Last appointment: 2/27/25 Isela  Next appointment: None  Previous refill encounter(s): 11/1/24 30 + 5    Requested Prescriptions     Pending Prescriptions Disp Refills    amLODIPine (NORVASC) 10 MG tablet 30 tablet 5     Sig: Take 1 tablet by mouth daily     For Pharmacy Admin Tracking Only    Program: Medication Refill  CPA in place:    Recommendation Provided To:   Intervention Detail: New Rx: 1, reason: Patient Preference  Intervention Accepted By:   Gap Closed?:    Time Spent (min): 5

## 2025-05-05 ENCOUNTER — HOSPITAL ENCOUNTER (OUTPATIENT)
Facility: HOSPITAL | Age: 75
Discharge: HOME | End: 2025-05-08
Payer: MEDICARE

## 2025-05-05 VITALS — HEIGHT: 62 IN | BODY MASS INDEX: 33.68 KG/M2 | WEIGHT: 183 LBS

## 2025-05-05 DIAGNOSIS — Z12.31 VISIT FOR SCREENING MAMMOGRAM: ICD-10-CM

## 2025-05-05 PROCEDURE — 77063 BREAST TOMOSYNTHESIS BI: CPT

## 2025-05-11 RX ORDER — AMLODIPINE BESYLATE 10 MG/1
10 TABLET ORAL DAILY
Qty: 90 TABLET | Refills: 2 | OUTPATIENT
Start: 2025-05-11

## 2025-05-19 RX ORDER — SIMVASTATIN 40 MG
40 TABLET ORAL NIGHTLY
Qty: 90 TABLET | Refills: 2 | Status: SHIPPED | OUTPATIENT
Start: 2025-05-19

## 2025-05-19 NOTE — TELEPHONE ENCOUNTER
PCP: Tani Weiss MD    Last appt: 2/27/2025       Future Appointments   Date Time Provider Department Center   6/17/2025 10:00 AM Mark Dangelo PSYD [facility]   1/5/2026 10:00 AM Dilia Lewis, APRN - NP [facility]       Requested Prescriptions     Pending Prescriptions Disp Refills    simvastatin (ZOCOR) 40 MG tablet [Pharmacy Med Name: Simvastatin Oral Tablet 40 MG] 90 tablet 3     Sig: TAKE 1 TABLET EVERY NIGHT       Prior labs and Blood pressures:  BP Readings from Last 3 Encounters:   02/27/25 134/72   01/13/25 (!) 121/56   10/16/24 132/74     Lab Results   Component Value Date/Time     02/27/2025 09:32 AM    K 4.3 02/27/2025 09:32 AM     02/27/2025 09:32 AM    CO2 28 02/27/2025 09:32 AM    BUN 25 02/27/2025 09:32 AM    GFRAA 60 07/25/2022 06:25 PM     No results found for: \"HBA1C\", \"[id number]\"  Lab Results   Component Value Date/Time    CHOL 139 02/27/2025 09:32 AM    HDL 56 02/27/2025 09:32 AM    LDL 66.6 02/27/2025 09:32 AM    LDL 48.2 01/08/2024 10:06 AM    VLDL 16.4 02/27/2025 09:32 AM     No results found for: \"VITD3\"    Lab Results   Component Value Date/Time    TSH 0.88 05/09/2024 05:08 AM

## 2025-06-16 NOTE — PROGRESS NOTES
Intake Note      Patient Name: Yanet Posada  YOB: 1950    Age: 75 y.o.  Date of Intake: 6/17/2025   Education: 15 Ethnicity White   Gender: Female Referring Provider: Dr. Weiss     REASON FOR REFERRAL AND EVALUATION PROCEDURES:  Yanet Posada  was referred for evaluation by her Primary Care Physician to assist in differential diagnosis and individualized treatment planning. she understood the rationale and procedures for evaluation, as well as the limits to confidentiality, and agreed to participate. she consented to have this report made available to her  treating providers through her  electronic medical records.   History Sources: Patient, Spouse, and Medical Record    HISTORY OF PRESENT ILLNESS:  The patient is a 75-year-old female with pertinent medical history noted for hyperlipidemia, prediabetes, anxiety, depression, CVA, schizophrenia, insomnia, hypertension, DAVILA, mild cognitive impairment, and major depressive disorder.  She initially establish care in our office in June 2023.  From my previous note:    The patient presented for clinical interview accompanied by her .  While she appeared capable of contributing to her history, her  provided substantive details.  According to the patient's , the patient sustained a ground-level fall in her backyard approximately 3 years ago.  This fall was reportedly associated with momentary loss of consciousness but no PTA or other physical sequela.  The patient's  indicated that after this fall, the patient experienced gradually progressive declines in her cognitive functioning that have advanced at a more rapid pace, particularly over the past 6 to 12 months.  The patient and her  indicated the earliest change they noticed in their primary concern relates to expressive language problems.  They stated the patient appears to have significant difficulty identifying the words she wants to use in conversation and as a

## 2025-06-17 ENCOUNTER — OFFICE VISIT (OUTPATIENT)
Age: 75
End: 2025-06-17
Payer: MEDICARE

## 2025-06-17 DIAGNOSIS — F33.1 MAJOR DEPRESSIVE DISORDER, RECURRENT EPISODE, MODERATE WITH ANXIOUS DISTRESS (HCC): ICD-10-CM

## 2025-06-17 DIAGNOSIS — G31.84 MILD COGNITIVE IMPAIRMENT: Primary | ICD-10-CM

## 2025-06-17 PROCEDURE — 90791 PSYCH DIAGNOSTIC EVALUATION: CPT | Performed by: CLINICAL NEUROPSYCHOLOGIST

## 2025-06-17 PROCEDURE — 1123F ACP DISCUSS/DSCN MKR DOCD: CPT | Performed by: CLINICAL NEUROPSYCHOLOGIST

## 2025-06-17 PROCEDURE — 1036F TOBACCO NON-USER: CPT | Performed by: CLINICAL NEUROPSYCHOLOGIST

## 2025-06-20 ENCOUNTER — PROCEDURE VISIT (OUTPATIENT)
Age: 75
End: 2025-06-20
Payer: MEDICARE

## 2025-06-20 DIAGNOSIS — G31.84 MILD COGNITIVE IMPAIRMENT: Primary | ICD-10-CM

## 2025-06-20 DIAGNOSIS — F33.1 MAJOR DEPRESSIVE DISORDER, RECURRENT EPISODE, MODERATE WITH ANXIOUS DISTRESS (HCC): ICD-10-CM

## 2025-06-20 PROCEDURE — 96133 NRPSYC TST EVAL PHYS/QHP EA: CPT | Performed by: CLINICAL NEUROPSYCHOLOGIST

## 2025-06-20 PROCEDURE — 96138 PSYCL/NRPSYC TECH 1ST: CPT | Performed by: CLINICAL NEUROPSYCHOLOGIST

## 2025-06-20 PROCEDURE — 96139 PSYCL/NRPSYC TST TECH EA: CPT | Performed by: CLINICAL NEUROPSYCHOLOGIST

## 2025-06-20 PROCEDURE — 96132 NRPSYC TST EVAL PHYS/QHP 1ST: CPT | Performed by: CLINICAL NEUROPSYCHOLOGIST

## 2025-07-07 ENCOUNTER — OFFICE VISIT (OUTPATIENT)
Age: 75
End: 2025-07-07
Payer: MEDICARE

## 2025-07-07 DIAGNOSIS — G31.84 MILD COGNITIVE IMPAIRMENT: Primary | ICD-10-CM

## 2025-07-07 DIAGNOSIS — F33.1 MAJOR DEPRESSIVE DISORDER, RECURRENT EPISODE, MODERATE WITH ANXIOUS DISTRESS (HCC): ICD-10-CM

## 2025-07-07 PROCEDURE — 96132 NRPSYC TST EVAL PHYS/QHP 1ST: CPT | Performed by: CLINICAL NEUROPSYCHOLOGIST

## 2025-07-07 NOTE — PROGRESS NOTES
Neuropsychological Evaluation Report      Patient Name: Yanet Posada  YOB: 1950    Age: 75 y.o.  Date of Intake: 2025   Education: 15 Date of Testin2025   Gender: Female Ethnicity: White     Referring Provider: Dr. Weiss     REASON FOR REFERRAL AND EVALUATION PROCEDURES:  Yanet Posada  was referred for neuropsychological evaluation by her Primary Care Physician to obtain a quantitative assessment of her current level of neurocognitive functioning, assist in differential diagnosis, and aid in individualized treatment planning. The patient understood the rationale and procedures for evaluation, as well as the limits to confidentiality, and they agreed to participate. The patient consented to have this report made available to her  treating providers through her  electronic medical records. This evaluation was completed with the patient by Mark Dangelo PsyD with the exception of testing by technician, which was completed by SARAHY Wright under the supervision of Dr. Dangelo.  History Sources: Patient, Spouse, Medical Record, and Test Data    SUMMARY AND IMPRESSION:  The following section is a summary of the patient's pertinent test results and impressions. A more thorough review of the patient's background and test scores can be found below.    The results of this evaluation should be interpreted with caution.  There was evidence of fluctuating test investment over the course of the evaluation.  Performances that generated scores in the below expectation range may not be reflective of the patient's optimal cognitive functioning in that area.    At face value, the patient's performance generated weak scores in aspects of language (comprehension, or production, confrontation naming, and verbal fluency).  However, it is notable the patient's comprehension weakness was more due to complex attention/working memory rather than julian auditory comprehension impairment.  Slow

## 2025-08-27 ENCOUNTER — APPOINTMENT (OUTPATIENT)
Facility: HOSPITAL | Age: 75
End: 2025-08-27
Attending: EMERGENCY MEDICINE
Payer: MEDICARE

## 2025-08-27 ENCOUNTER — HOSPITAL ENCOUNTER (EMERGENCY)
Facility: HOSPITAL | Age: 75
Discharge: HOME | End: 2025-08-27
Attending: EMERGENCY MEDICINE
Payer: MEDICARE

## 2025-08-27 VITALS
DIASTOLIC BLOOD PRESSURE: 59 MMHG | WEIGHT: 182 LBS | SYSTOLIC BLOOD PRESSURE: 135 MMHG | BODY MASS INDEX: 33.29 KG/M2 | HEART RATE: 60 BPM | OXYGEN SATURATION: 96 % | RESPIRATION RATE: 16 BRPM | TEMPERATURE: 97 F

## 2025-08-27 DIAGNOSIS — R00.2 PALPITATIONS: ICD-10-CM

## 2025-08-27 DIAGNOSIS — R19.7 DIARRHEA, UNSPECIFIED TYPE: Primary | ICD-10-CM

## 2025-08-27 LAB
ALBUMIN SERPL-MCNC: 4 G/DL (ref 3.5–5)
ALBUMIN/GLOB SERPL: 1.1 (ref 1.1–2.2)
ALP SERPL-CCNC: 83 U/L (ref 45–117)
ALT SERPL-CCNC: 35 U/L (ref 12–78)
ANION GAP SERPL CALC-SCNC: 7 MMOL/L (ref 2–12)
AST SERPL-CCNC: 26 U/L (ref 15–37)
BASOPHILS # BLD: 0.08 K/UL (ref 0–0.1)
BASOPHILS NFR BLD: 0.9 % (ref 0–1)
BILIRUB SERPL-MCNC: 0.7 MG/DL (ref 0.2–1)
BUN SERPL-MCNC: 17 MG/DL (ref 6–20)
BUN/CREAT SERPL: 15 (ref 12–20)
CALCIUM SERPL-MCNC: 9.2 MG/DL (ref 8.5–10.1)
CHLORIDE SERPL-SCNC: 103 MMOL/L (ref 97–108)
CO2 SERPL-SCNC: 27 MMOL/L (ref 21–32)
CREAT SERPL-MCNC: 1.14 MG/DL (ref 0.55–1.02)
DIFFERENTIAL METHOD BLD: NORMAL
EKG ATRIAL RATE: 95 BPM
EKG DIAGNOSIS: NORMAL
EKG P AXIS: 47 DEGREES
EKG P-R INTERVAL: 162 MS
EKG Q-T INTERVAL: 352 MS
EKG QRS DURATION: 120 MS
EKG QTC CALCULATION (BAZETT): 442 MS
EKG R AXIS: -61 DEGREES
EKG T AXIS: 49 DEGREES
EKG VENTRICULAR RATE: 95 BPM
EOSINOPHIL # BLD: 0.17 K/UL (ref 0–0.4)
EOSINOPHIL NFR BLD: 1.9 % (ref 0–7)
ERYTHROCYTE [DISTWIDTH] IN BLOOD BY AUTOMATED COUNT: 12 % (ref 11.5–14.5)
GLOBULIN SER CALC-MCNC: 3.7 G/DL (ref 2–4)
GLUCOSE SERPL-MCNC: 138 MG/DL (ref 65–100)
HCT VFR BLD AUTO: 40 % (ref 35–47)
HGB BLD-MCNC: 14.1 G/DL (ref 11.5–16)
IMM GRANULOCYTES # BLD AUTO: 0.03 K/UL (ref 0–0.04)
IMM GRANULOCYTES NFR BLD AUTO: 0.3 % (ref 0–0.5)
LIPASE SERPL-CCNC: 32 U/L (ref 13–75)
LYMPHOCYTES # BLD: 2.05 K/UL (ref 0.8–3.5)
LYMPHOCYTES NFR BLD: 22.8 % (ref 12–49)
MAGNESIUM SERPL-MCNC: 2.2 MG/DL (ref 1.6–2.4)
MCH RBC QN AUTO: 30.1 PG (ref 26–34)
MCHC RBC AUTO-ENTMCNC: 35.3 G/DL (ref 30–36.5)
MCV RBC AUTO: 85.3 FL (ref 80–99)
MONOCYTES # BLD: 0.74 K/UL (ref 0–1)
MONOCYTES NFR BLD: 8.2 % (ref 5–13)
NEUTS SEG # BLD: 5.92 K/UL (ref 1.8–8)
NEUTS SEG NFR BLD: 65.9 % (ref 32–75)
NRBC # BLD: 0 K/UL (ref 0–0.01)
NRBC BLD-RTO: 0 PER 100 WBC
PLATELET # BLD AUTO: 211 K/UL (ref 150–400)
PMV BLD AUTO: 9.7 FL (ref 8.9–12.9)
POTASSIUM SERPL-SCNC: 3.8 MMOL/L (ref 3.5–5.1)
PROT SERPL-MCNC: 7.7 G/DL (ref 6.4–8.2)
RBC # BLD AUTO: 4.69 M/UL (ref 3.8–5.2)
S PYO DNA THROAT QL NAA+PROBE: NOT DETECTED
SODIUM SERPL-SCNC: 137 MMOL/L (ref 136–145)
TROPONIN I SERPL HS-MCNC: 10 NG/L (ref 0–51)
TSH SERPL DL<=0.05 MIU/L-ACNC: 2.79 UIU/ML (ref 0.36–3.74)
WBC # BLD AUTO: 9 K/UL (ref 3.6–11)

## 2025-08-27 PROCEDURE — 71046 X-RAY EXAM CHEST 2 VIEWS: CPT

## 2025-08-27 PROCEDURE — 84484 ASSAY OF TROPONIN QUANT: CPT

## 2025-08-27 PROCEDURE — 83690 ASSAY OF LIPASE: CPT

## 2025-08-27 PROCEDURE — 93005 ELECTROCARDIOGRAM TRACING: CPT | Performed by: EMERGENCY MEDICINE

## 2025-08-27 PROCEDURE — 87651 STREP A DNA AMP PROBE: CPT

## 2025-08-27 PROCEDURE — 84443 ASSAY THYROID STIM HORMONE: CPT

## 2025-08-27 PROCEDURE — 36415 COLL VENOUS BLD VENIPUNCTURE: CPT

## 2025-08-27 PROCEDURE — 83735 ASSAY OF MAGNESIUM: CPT

## 2025-08-27 PROCEDURE — 99285 EMERGENCY DEPT VISIT HI MDM: CPT

## 2025-08-27 PROCEDURE — 80053 COMPREHEN METABOLIC PANEL: CPT

## 2025-08-27 PROCEDURE — 85025 COMPLETE CBC W/AUTO DIFF WBC: CPT

## 2025-08-27 RX ORDER — CHLORAL HYDRATE 500 MG
1 CAPSULE ORAL 2 TIMES DAILY
COMMUNITY

## 2025-08-27 RX ORDER — MINERAL OIL, WHITE PETROLATUM .03; .94 G/G; G/G
1 OINTMENT OPHTHALMIC NIGHTLY
COMMUNITY

## 2025-08-27 RX ORDER — GLYCERIN/PROPYLENE GLYCOL 0.6 %-0.6%
1 DROPPERETTE, SINGLE-USE DROP DISPENSER OPHTHALMIC (EYE) 2 TIMES DAILY
COMMUNITY

## 2025-08-27 RX ORDER — NAPROXEN 500 MG/1
500 TABLET ORAL 2 TIMES DAILY
Qty: 20 TABLET | Refills: 0 | Status: SHIPPED | OUTPATIENT
Start: 2025-08-27

## 2025-08-27 ASSESSMENT — LIFESTYLE VARIABLES
HOW OFTEN DO YOU HAVE A DRINK CONTAINING ALCOHOL: NEVER
HOW MANY STANDARD DRINKS CONTAINING ALCOHOL DO YOU HAVE ON A TYPICAL DAY: PATIENT DOES NOT DRINK

## 2025-08-27 ASSESSMENT — PAIN - FUNCTIONAL ASSESSMENT: PAIN_FUNCTIONAL_ASSESSMENT: 0-10

## 2025-09-02 ENCOUNTER — OFFICE VISIT (OUTPATIENT)
Age: 75
End: 2025-09-02
Payer: MEDICARE

## 2025-09-02 VITALS
DIASTOLIC BLOOD PRESSURE: 66 MMHG | HEIGHT: 62 IN | SYSTOLIC BLOOD PRESSURE: 128 MMHG | HEART RATE: 75 BPM | TEMPERATURE: 97.7 F | OXYGEN SATURATION: 97 % | RESPIRATION RATE: 18 BRPM | BODY MASS INDEX: 33.68 KG/M2 | WEIGHT: 183 LBS

## 2025-09-02 DIAGNOSIS — R07.9 CHEST PAIN, UNSPECIFIED TYPE: Primary | ICD-10-CM

## 2025-09-02 DIAGNOSIS — N64.4 PAIN OF BOTH BREASTS: ICD-10-CM

## 2025-09-02 DIAGNOSIS — R10.9 ABDOMINAL PAIN, UNSPECIFIED ABDOMINAL LOCATION: ICD-10-CM

## 2025-09-02 DIAGNOSIS — I10 PRIMARY HYPERTENSION: ICD-10-CM

## 2025-09-02 DIAGNOSIS — K21.9 GASTROESOPHAGEAL REFLUX DISEASE, UNSPECIFIED WHETHER ESOPHAGITIS PRESENT: ICD-10-CM

## 2025-09-02 DIAGNOSIS — R35.0 URINARY FREQUENCY: ICD-10-CM

## 2025-09-02 PROCEDURE — 99214 OFFICE O/P EST MOD 30 MIN: CPT | Performed by: FAMILY MEDICINE

## 2025-09-02 PROCEDURE — 3074F SYST BP LT 130 MM HG: CPT | Performed by: FAMILY MEDICINE

## 2025-09-02 PROCEDURE — 3078F DIAST BP <80 MM HG: CPT | Performed by: FAMILY MEDICINE

## 2025-09-02 PROCEDURE — G2211 COMPLEX E/M VISIT ADD ON: HCPCS | Performed by: FAMILY MEDICINE

## 2025-09-02 PROCEDURE — 3017F COLORECTAL CA SCREEN DOC REV: CPT | Performed by: FAMILY MEDICINE

## 2025-09-02 PROCEDURE — G8427 DOCREV CUR MEDS BY ELIG CLIN: HCPCS | Performed by: FAMILY MEDICINE

## 2025-09-02 PROCEDURE — 1126F AMNT PAIN NOTED NONE PRSNT: CPT | Performed by: FAMILY MEDICINE

## 2025-09-02 PROCEDURE — 93005 ELECTROCARDIOGRAM TRACING: CPT | Performed by: FAMILY MEDICINE

## 2025-09-02 PROCEDURE — G8417 CALC BMI ABV UP PARAM F/U: HCPCS | Performed by: FAMILY MEDICINE

## 2025-09-02 PROCEDURE — 1123F ACP DISCUSS/DSCN MKR DOCD: CPT | Performed by: FAMILY MEDICINE

## 2025-09-02 PROCEDURE — G8399 PT W/DXA RESULTS DOCUMENT: HCPCS | Performed by: FAMILY MEDICINE

## 2025-09-02 PROCEDURE — 1036F TOBACCO NON-USER: CPT | Performed by: FAMILY MEDICINE

## 2025-09-02 PROCEDURE — 93010 ELECTROCARDIOGRAM REPORT: CPT | Performed by: FAMILY MEDICINE

## 2025-09-02 PROCEDURE — 1090F PRES/ABSN URINE INCON ASSESS: CPT | Performed by: FAMILY MEDICINE

## 2025-09-02 PROCEDURE — 1159F MED LIST DOCD IN RCRD: CPT | Performed by: FAMILY MEDICINE

## 2025-09-03 ENCOUNTER — TELEPHONE (OUTPATIENT)
Age: 75
End: 2025-09-03

## 2025-09-03 DIAGNOSIS — R07.9 CHEST PAIN, UNSPECIFIED TYPE: Primary | ICD-10-CM

## 2025-09-03 DIAGNOSIS — N64.4 PAIN OF BOTH BREASTS: ICD-10-CM

## 2025-09-03 LAB
ALBUMIN SERPL-MCNC: 4.2 G/DL (ref 3.5–5.2)
ALBUMIN/GLOB SERPL: 1.4 (ref 1.1–2.2)
ALP SERPL-CCNC: 78 U/L (ref 35–104)
ALT SERPL-CCNC: 30 U/L (ref 10–35)
ANION GAP SERPL CALC-SCNC: 14 MMOL/L (ref 2–14)
AST SERPL-CCNC: 30 U/L (ref 10–35)
BACTERIA SPEC CULT: NORMAL
BASOPHILS # BLD: 0.08 K/UL (ref 0–0.1)
BASOPHILS NFR BLD: 0.8 % (ref 0–1)
BILIRUB SERPL-MCNC: 0.6 MG/DL (ref 0–1.2)
BUN SERPL-MCNC: 27 MG/DL (ref 8–23)
BUN/CREAT SERPL: 23 (ref 12–20)
CALCIUM SERPL-MCNC: 9.9 MG/DL (ref 8.8–10.2)
CC UR VC: NORMAL
CHLORIDE SERPL-SCNC: 104 MMOL/L (ref 98–107)
CO2 SERPL-SCNC: 23 MMOL/L (ref 20–29)
CREAT SERPL-MCNC: 1.18 MG/DL (ref 0.6–1)
DIFFERENTIAL METHOD BLD: NORMAL
EOSINOPHIL # BLD: 0.23 K/UL (ref 0–0.4)
EOSINOPHIL NFR BLD: 2.2 % (ref 0–7)
ERYTHROCYTE [DISTWIDTH] IN BLOOD BY AUTOMATED COUNT: 12 % (ref 11.5–14.5)
GLOBULIN SER CALC-MCNC: 2.9 G/DL (ref 2–4)
GLUCOSE SERPL-MCNC: 84 MG/DL (ref 65–100)
HCT VFR BLD AUTO: 41.8 % (ref 35–47)
HGB BLD-MCNC: 14 G/DL (ref 11.5–16)
IMM GRANULOCYTES # BLD AUTO: 0.04 K/UL (ref 0–0.04)
IMM GRANULOCYTES NFR BLD AUTO: 0.4 % (ref 0–0.5)
LIPASE SERPL-CCNC: 25 U/L (ref 13–60)
LYMPHOCYTES # BLD: 1.79 K/UL (ref 0.8–3.5)
LYMPHOCYTES NFR BLD: 17.1 % (ref 12–49)
MCH RBC QN AUTO: 29.5 PG (ref 26–34)
MCHC RBC AUTO-ENTMCNC: 33.5 G/DL (ref 30–36.5)
MCV RBC AUTO: 88.2 FL (ref 80–99)
MONOCYTES # BLD: 0.92 K/UL (ref 0–1)
MONOCYTES NFR BLD: 8.8 % (ref 5–13)
NEUTS SEG # BLD: 7.38 K/UL (ref 1.8–8)
NEUTS SEG NFR BLD: 70.7 % (ref 32–75)
NRBC # BLD: 0 K/UL (ref 0–0.01)
NRBC BLD-RTO: 0 PER 100 WBC
PLATELET # BLD AUTO: 226 K/UL (ref 150–400)
PMV BLD AUTO: 10.5 FL (ref 8.9–12.9)
POTASSIUM SERPL-SCNC: 4 MMOL/L (ref 3.5–5.1)
PROT SERPL-MCNC: 7.1 G/DL (ref 6.4–8.3)
RBC # BLD AUTO: 4.74 M/UL (ref 3.8–5.2)
SERVICE CMNT-IMP: NORMAL
SODIUM SERPL-SCNC: 141 MMOL/L (ref 136–145)
WBC # BLD AUTO: 10.4 K/UL (ref 3.6–11)

## (undated) DEVICE — SOLIDIFIER FLUID 3000 CC ABSORB

## (undated) DEVICE — APPLIER CLP M/L SHFT DIA5MM 15 LIG LIGAMAX 5

## (undated) DEVICE — Device

## (undated) DEVICE — TROCAR SITE CLOSURE DEVICE: Brand: ENDO CLOSE

## (undated) DEVICE — SYR 20ML LL STRL LF --

## (undated) DEVICE — DERMABOND SKIN ADH 0.7ML -- DERMABOND ADVANCED 12/BX

## (undated) DEVICE — TUBING HYDR IRR --

## (undated) DEVICE — E-Z CLEAN, PTFE COATED, ELECTROSURGICAL LAPAROSCOPIC ELECTRODE, L-HOOK, 33 CM., SINGLE-USE, FOR USE WITH HAND CONTROL PENCIL: Brand: MEGADYNE

## (undated) DEVICE — SUTURE SZ 0 27IN 5/8 CIR UR-6  TAPER PT VIOLET ABSRB VICRYL J603H

## (undated) DEVICE — SYRINGE MED 20ML STD CLR PLAS LUERLOCK TIP N CTRL DISP

## (undated) DEVICE — 1200 GUARD II KIT W/5MM TUBE W/O VAC TUBE: Brand: GUARDIAN

## (undated) DEVICE — ENDO CARRY-ON PROCEDURE KIT INCLUDES ENZYMATIC SPONGE, GAUZE, BIOHAZARD LABEL, TRAY, LUBRICANT, DIRTY SCOPE LABEL, WATER LABEL, TRAY, DRAWSTRING PAD, AND DEFENDO 4-PIECE KIT.: Brand: ENDO CARRY-ON PROCEDURE KIT

## (undated) DEVICE — NEEDLE HYPO 18GA L1.5IN PNK S STL HUB POLYPR SHLD REG BVL

## (undated) DEVICE — HYPODERMIC SAFETY NEEDLE: Brand: MAGELLAN

## (undated) DEVICE — MAX-CORE® DISPOSABLE CORE BIOPSY INSTRUMENT, 16G X 16CM: Brand: MAX-CORE

## (undated) DEVICE — Z CONVERTED USE 2274299 CUFF BLD PRESSURE LNG MED AD 25-35 CM ARM FLEXIPORT DISP

## (undated) DEVICE — FORCEPS BX L240CM JAW DIA2.8MM L CAP W/ NDL MIC MESH TOOTH

## (undated) DEVICE — TROCAR: Brand: KII® OPTICAL ACCESS SYSTEM

## (undated) DEVICE — TRAY BX SFT TISS W/ RUL ALC PREP PD FEN DRP TWL LUERLOCK

## (undated) DEVICE — SET EXTN TBNG L BOR 4 W STPCOCK ST 32IN PRIMING VOL 6ML

## (undated) DEVICE — CATH IV AUTOGRD BC BLU 22GA 25 -- INSYTE

## (undated) DEVICE — SUTURE MCRYL SZ 4-0 L27IN ABSRB UD L19MM PS-2 1/2 CIR PRIM Y426H

## (undated) DEVICE — TROCAR: Brand: KII® SLEEVE

## (undated) DEVICE — KIT IV STRT W CHLORAPREP PD 1ML

## (undated) DEVICE — GARMENT,MEDLINE,DVT,INT,CALF,MED, GEN2: Brand: MEDLINE

## (undated) DEVICE — LAPAROSCOPIC TROCAR SLEEVE/SINGLE USE: Brand: KII® OPTICAL ACCESS SYSTEM

## (undated) DEVICE — CLICKLINE SCISSORS INSERT: Brand: CLICKLINE

## (undated) DEVICE — BW-412T DISP COMBO CLEANING BRUSH: Brand: SINGLE USE COMBINATION CLEANING BRUSH

## (undated) DEVICE — KENDALL RADIOLUCENT FOAM MONITORING ELECTRODE -RECTANGULAR SHAPE: Brand: KENDALL

## (undated) DEVICE — SET ADMIN 16ML TBNG L100IN 2 Y INJ SITE IV PIGGY BK DISP

## (undated) DEVICE — GENERAL LAPAROSCOPY - SMH: Brand: MEDLINE INDUSTRIES, INC.

## (undated) DEVICE — SYSTEM EVAC SMOKE LAPARSCOPIC

## (undated) DEVICE — COVER LT HNDL PLAS RIG 1 PER PK

## (undated) DEVICE — 4-PORT MANIFOLD: Brand: NEPTUNE 2

## (undated) DEVICE — CANN NASAL O2 CAPNOGRAPHY AD -- FILTERLINE

## (undated) DEVICE — GLOVE SURG SZ 75 L1212IN FNGR THK138MIL BRN LTX FREE

## (undated) DEVICE — BAG BELONG PT PERS CLEAR HANDL

## (undated) DEVICE — INSUFFLATION NEEDLE: Brand: SURGINEEDLE

## (undated) DEVICE — BAG SPEC RETRV 275ML 10ML DISPOSABLE RELIACATCH